# Patient Record
Sex: MALE | Race: WHITE | NOT HISPANIC OR LATINO | Employment: UNEMPLOYED
[De-identification: names, ages, dates, MRNs, and addresses within clinical notes are randomized per-mention and may not be internally consistent; named-entity substitution may affect disease eponyms.]

---

## 2018-06-29 LAB
ALBUMIN SERPL-MCNC: 4.5 GM/DL (ref 3.6–5.1)
ALBUMIN/GLOB SERPL: 1.5 {RATIO} (ref 1–2.4)
ALP SERPL-CCNC: 56 UNIT/L (ref 45–117)
ALT SERPL-CCNC: 23 UNIT/L
AMPHETAMINES UR QL SCN>500 NG/ML: NEGATIVE
ANALYZER ANC (IANC): ABNORMAL
ANION GAP SERPL CALC-SCNC: 13 MMOL/L (ref 10–20)
APAP SERPL-MCNC: <2 MCG/ML (ref 10–30)
AST SERPL-CCNC: 18 UNIT/L
BARBITURATES UR QL SCN>200 NG/ML: NEGATIVE
BASOPHILS # BLD: 0.1 THOUSAND/MCL (ref 0–0.3)
BASOPHILS NFR BLD: 0 %
BENZODIAZ UR QL SCN>200 NG/ML: NEGATIVE
BILIRUB SERPL-MCNC: 2.6 MG/DL (ref 0.2–1)
BUN SERPL-MCNC: 13 MG/DL (ref 6–20)
BUN/CREAT SERPL: 11 (ref 7–25)
BZE UR QL SCN>150 NG/ML: NEGATIVE
CALCIUM SERPL-MCNC: 9.3 MG/DL (ref 8.4–10.2)
CANNABINOIDS UR QL SCN>50 NG/ML: POSITIVE
CHLORIDE: 103 MMOL/L (ref 98–107)
CO2 SERPL-SCNC: 27 MMOL/L (ref 21–32)
CREAT SERPL-MCNC: 1.18 MG/DL (ref 0.67–1.17)
DIFFERENTIAL METHOD BLD: ABNORMAL
EOSINOPHIL # BLD: 0.1 THOUSAND/MCL (ref 0.1–0.5)
EOSINOPHIL NFR BLD: 1 %
ERYTHROCYTE [DISTWIDTH] IN BLOOD: 12.6 % (ref 11–15)
ETHANOL SERPL-MCNC: NORMAL MG/DL
GLOBULIN SER-MCNC: 3 GM/DL (ref 2–4)
GLUCOSE SERPL-MCNC: 104 MG/DL (ref 65–99)
HEMATOCRIT: 45.8 % (ref 39–51)
HGB BLD-MCNC: 15 GM/DL (ref 13–17)
IMM GRANULOCYTES # BLD AUTO: 0 THOUSAND/MCL (ref 0–0.2)
IMM GRANULOCYTES NFR BLD: 0 %
LYMPHOCYTES # BLD: 1.1 THOUSAND/MCL (ref 1–4.8)
LYMPHOCYTES NFR BLD: 10 %
MCH RBC QN AUTO: 28.5 PG (ref 26–34)
MCHC RBC AUTO-ENTMCNC: 32.8 GM/DL (ref 32–36.5)
MCV RBC AUTO: 86.9 FL (ref 78–100)
MONOCYTES # BLD: 0.7 THOUSAND/MCL (ref 0.3–0.9)
MONOCYTES NFR BLD: 6 %
NEUTROPHILS # BLD: 9.3 THOUSAND/MCL (ref 1.8–7.7)
NEUTROPHILS NFR BLD: 83 %
NEUTS SEG NFR BLD: ABNORMAL %
NRBC (NRBCRE): 0 /100 WBC
OPIATES UR QL SCN>300 NG/ML: NEGATIVE
PCP UR QL SCN>25 NG/ML: NEGATIVE
PLATELET # BLD: 177 THOUSAND/MCL (ref 140–450)
POTASSIUM SERPL-SCNC: 3.7 MMOL/L (ref 3.4–5.1)
PROT SERPL-MCNC: 7.5 GM/DL (ref 6.4–8.2)
RBC # BLD: 5.27 MILLION/MCL (ref 4.5–5.9)
SALICYLATES SERPL-MCNC: <2.8 MG/DL
SODIUM SERPL-SCNC: 139 MMOL/L (ref 135–145)
WBC # BLD: 11.3 THOUSAND/MCL (ref 4.2–11)

## 2018-07-02 ENCOUNTER — HOSPITAL (OUTPATIENT)
Dept: OTHER | Age: 28
End: 2018-07-02
Attending: PSYCHIATRY & NEUROLOGY

## 2018-07-03 ENCOUNTER — DIAGNOSTIC TRANS (OUTPATIENT)
Dept: OTHER | Age: 28
End: 2018-07-03

## 2018-07-03 LAB
CHOLEST SERPL-MCNC: 128 MG/DL
CHOLEST/HDLC SERPL: 3.4 {RATIO}
HDLC SERPL-MCNC: 38 MG/DL
LDLC SERPL CALC-MCNC: 74 MG/DL
NONHDLC SERPL-MCNC: 90 MG/DL
TRIGLYCERIDE (TRIGP): 78 MG/DL

## 2018-07-04 LAB
GLYCOHEMOGLOBIN: 6.1 % (ref 4.5–5.6)
TSH SERPL-ACNC: 2.7 MCUNIT/ML (ref 0.35–5)

## 2020-01-24 ENCOUNTER — OFFICE VISIT (OUTPATIENT)
Dept: FAMILY MEDICINE | Facility: CLINIC | Age: 30
End: 2020-01-24
Payer: COMMERCIAL

## 2020-01-24 VITALS
SYSTOLIC BLOOD PRESSURE: 130 MMHG | HEART RATE: 91 BPM | OXYGEN SATURATION: 98 % | HEIGHT: 67 IN | TEMPERATURE: 99 F | DIASTOLIC BLOOD PRESSURE: 80 MMHG | BODY MASS INDEX: 25.55 KG/M2 | WEIGHT: 162.81 LBS

## 2020-01-24 DIAGNOSIS — Z23 IMMUNIZATION DUE: ICD-10-CM

## 2020-01-24 DIAGNOSIS — N52.9 ERECTILE DYSFUNCTION, UNSPECIFIED ERECTILE DYSFUNCTION TYPE: ICD-10-CM

## 2020-01-24 DIAGNOSIS — Z11.59 SCREENING FOR VIRAL DISEASE: ICD-10-CM

## 2020-01-24 DIAGNOSIS — F41.8 ANXIETY WITH DEPRESSION: Primary | ICD-10-CM

## 2020-01-24 DIAGNOSIS — Z72.0 TOBACCO USE: ICD-10-CM

## 2020-01-24 PROCEDURE — 99999 PR PBB SHADOW E&M-NEW PATIENT-LVL IV: ICD-10-PCS | Mod: PBBFAC,,, | Performed by: FAMILY MEDICINE

## 2020-01-24 PROCEDURE — 99203 OFFICE O/P NEW LOW 30 MIN: CPT | Mod: S$GLB,,, | Performed by: FAMILY MEDICINE

## 2020-01-24 PROCEDURE — 3008F PR BODY MASS INDEX (BMI) DOCUMENTED: ICD-10-PCS | Mod: CPTII,S$GLB,, | Performed by: FAMILY MEDICINE

## 2020-01-24 PROCEDURE — 99203 PR OFFICE/OUTPT VISIT, NEW, LEVL III, 30-44 MIN: ICD-10-PCS | Mod: S$GLB,,, | Performed by: FAMILY MEDICINE

## 2020-01-24 PROCEDURE — 99999 PR PBB SHADOW E&M-NEW PATIENT-LVL IV: CPT | Mod: PBBFAC,,, | Performed by: FAMILY MEDICINE

## 2020-01-24 PROCEDURE — 3008F BODY MASS INDEX DOCD: CPT | Mod: CPTII,S$GLB,, | Performed by: FAMILY MEDICINE

## 2020-01-24 RX ORDER — SILDENAFIL CITRATE 20 MG/1
TABLET ORAL
Qty: 20 TABLET | Refills: 2 | Status: SHIPPED | OUTPATIENT
Start: 2020-01-24 | End: 2020-01-30

## 2020-01-24 NOTE — PROGRESS NOTES
THIS DOCUMENT WAS MADE IN PART WITH VOICE RECOGNITION SOFTWARE.  OCCASIONALLY THIS SOFTWARE WILL MISINTERPRET WORDS OR PHRASES.    Assessment and Plan:    1. Anxiety with depression  Controlled depression and anxiety, will use Trintellix secondary to lower incidence of sexual side effects.  Has been on sertraline with no improvement.  - vortioxetine (TRINTELLIX) 10 mg Tab; Take 1 tablet (10 mg total) by mouth once daily.  Dispense: 30 tablet; Refill: 11    2. Erectile dysfunction, unspecified erectile dysfunction type  I believe this is likely related to his depression and anxiety, will do standard blood work in use sildenafil as needed.  - CBC auto differential; Future  - Comprehensive metabolic panel; Future  - Lipid panel; Future  - Testosterone; Future  - TSH; Future  - T4, free; Future  - T3, free; Future  - Hemoglobin A1c; Future  - sildenafil (REVATIO) 20 mg Tab; Take 1-3 tablets at least 1 hour before sexual activity  Dispense: 20 tablet; Refill: 2    3. Tobacco use  Recommended cessation, patient motivated to quit  - Ambulatory referral to Smoking Cessation Program    4. Screening for viral disease  - HIV 1/2 Ag/Ab (4th Gen); Future    5. Immunization due  Defers immunization today        ______________________________________________________________________  Subjective:    Chief Complaint:  Chief Complaint   Patient presents with    Establish Care    Erectile Dysfunction     ocurring x a few months         HPI:  Felipe is a 29 y.o. year old     Erectile dysfunction  Duration of 8 months  Trouble gaining / maintaining erection  Difficult to reach orgasm.   No history of elevated cholesterol , diabetes, hypertension  Denies any numbness to area  No medications currently  Never tried ED medication  Has Anxiety / Depression = A little worse during this time period.     Tobacco use, history of marijuana use  Motivated to quit     Tricuspid insufficiency  Denies any heart symptoms  Diagnosis at early age.  "    Anxiety with depression with history of suicidal ideation  Seen in emergency department on 02/28/2018 at [facility] for crisis intervention.  Severe depression with thoughts of worthlessness.  Multiple traumatic childhood events, witness murder of friend.  Was transferred to an inpatient psychiatric facility.  Previously on Zoloft; quit about 1 year ago; quit due to insurance issues.   Currently sad more days than happy; currently having problem with roommate; stuck in a lease  Anxiety = "sometimes" ; self esteem issues cause worry.       Past Medical History:  Past Medical History:   Diagnosis Date    Anxiety     Depression        Past Surgical History:  History reviewed. No pertinent surgical history.    Family History:  History reviewed. No pertinent family history.    Social History:  Social History     Socioeconomic History    Marital status: Single     Spouse name: Not on file    Number of children: Not on file    Years of education: Not on file    Highest education level: Not on file   Occupational History    Not on file   Social Needs    Financial resource strain: Not on file    Food insecurity:     Worry: Not on file     Inability: Not on file    Transportation needs:     Medical: Not on file     Non-medical: Not on file   Tobacco Use    Smoking status: Current Every Day Smoker     Types: Cigarettes, Vaping with nicotine     Start date: 2004    Smokeless tobacco: Never Used   Substance and Sexual Activity    Alcohol use: Not on file    Drug use: Not on file    Sexual activity: Not on file   Lifestyle    Physical activity:     Days per week: Not on file     Minutes per session: Not on file    Stress: Not on file   Relationships    Social connections:     Talks on phone: Not on file     Gets together: Not on file     Attends Hindu service: Not on file     Active member of club or organization: Not on file     Attends meetings of clubs or organizations: Not on file     " "Relationship status: Not on file   Other Topics Concern    Not on file   Social History Narrative    Not on file       Medications:  No current outpatient medications on file prior to visit.     No current facility-administered medications on file prior to visit.        Allergies:  Patient has no known allergies.    Immunizations:  Immunization History   Administered Date(s) Administered    Meningococcal Conjugate (MCV4P) 08/22/2013    Tdap 08/22/2013       Review of Systems:  Review of Systems   Genitourinary:        Erectile dysfunction   Psychiatric/Behavioral: Positive for dysphoric mood.   All other systems reviewed and are negative.      Objective:    Vitals:  Vitals:    01/24/20 0929   BP: 130/80   Pulse: 91   Temp: 98.6 °F (37 °C)   TempSrc: Oral   SpO2: 98%   Weight: 73.9 kg (162 lb 13 oz)   Height: 5' 7" (1.702 m)   PainSc: 0-No pain       Physical Exam   Constitutional: No distress.   HENT:   Head: Normocephalic and atraumatic.   Eyes: Pupils are equal, round, and reactive to light. EOM are normal.   Neck: Neck supple.   Cardiovascular: Normal rate and regular rhythm. Exam reveals no friction rub.   No murmur heard.  Pulmonary/Chest: Effort normal and breath sounds normal.   Abdominal: Soft. Bowel sounds are normal. He exhibits no distension. There is no tenderness.   Skin: Skin is warm and dry. No rash noted.   Psychiatric: He has a normal mood and affect. His behavior is normal.       Data:  No previous labs, imaging, or notes available.        Bhavesh Black MD  Family Medicine    "

## 2020-01-28 ENCOUNTER — LAB VISIT (OUTPATIENT)
Dept: LAB | Facility: HOSPITAL | Age: 30
End: 2020-01-28
Attending: FAMILY MEDICINE
Payer: COMMERCIAL

## 2020-01-28 DIAGNOSIS — N52.9 ERECTILE DYSFUNCTION, UNSPECIFIED ERECTILE DYSFUNCTION TYPE: ICD-10-CM

## 2020-01-28 DIAGNOSIS — Z11.59 SCREENING FOR VIRAL DISEASE: ICD-10-CM

## 2020-01-28 LAB
ALBUMIN SERPL BCP-MCNC: 4.6 G/DL (ref 3.5–5.2)
ALP SERPL-CCNC: 67 U/L (ref 55–135)
ALT SERPL W/O P-5'-P-CCNC: 37 U/L (ref 10–44)
ANION GAP SERPL CALC-SCNC: 9 MMOL/L (ref 8–16)
AST SERPL-CCNC: 31 U/L (ref 10–40)
BASOPHILS # BLD AUTO: 0.04 K/UL (ref 0–0.2)
BASOPHILS NFR BLD: 0.9 % (ref 0–1.9)
BILIRUB SERPL-MCNC: 0.4 MG/DL (ref 0.1–1)
BUN SERPL-MCNC: 13 MG/DL (ref 6–20)
CALCIUM SERPL-MCNC: 9.8 MG/DL (ref 8.7–10.5)
CHLORIDE SERPL-SCNC: 106 MMOL/L (ref 95–110)
CHOLEST SERPL-MCNC: 208 MG/DL (ref 120–199)
CHOLEST/HDLC SERPL: 3.5 {RATIO} (ref 2–5)
CO2 SERPL-SCNC: 26 MMOL/L (ref 23–29)
CREAT SERPL-MCNC: 1.1 MG/DL (ref 0.5–1.4)
DIFFERENTIAL METHOD: ABNORMAL
EOSINOPHIL # BLD AUTO: 0.1 K/UL (ref 0–0.5)
EOSINOPHIL NFR BLD: 2.3 % (ref 0–8)
ERYTHROCYTE [DISTWIDTH] IN BLOOD BY AUTOMATED COUNT: 12.6 % (ref 11.5–14.5)
EST. GFR  (AFRICAN AMERICAN): >60 ML/MIN/1.73 M^2
EST. GFR  (NON AFRICAN AMERICAN): >60 ML/MIN/1.73 M^2
ESTIMATED AVG GLUCOSE: 103 MG/DL (ref 68–131)
GLUCOSE SERPL-MCNC: 94 MG/DL (ref 70–110)
HBA1C MFR BLD HPLC: 5.2 % (ref 4–5.6)
HCT VFR BLD AUTO: 47.5 % (ref 40–54)
HDLC SERPL-MCNC: 59 MG/DL (ref 40–75)
HDLC SERPL: 28.4 % (ref 20–50)
HGB BLD-MCNC: 15.8 G/DL (ref 14–18)
IMM GRANULOCYTES # BLD AUTO: 0.01 K/UL (ref 0–0.04)
IMM GRANULOCYTES NFR BLD AUTO: 0.2 % (ref 0–0.5)
LDLC SERPL CALC-MCNC: 134.2 MG/DL (ref 63–159)
LYMPHOCYTES # BLD AUTO: 1.7 K/UL (ref 1–4.8)
LYMPHOCYTES NFR BLD: 38.3 % (ref 18–48)
MCH RBC QN AUTO: 32.6 PG (ref 27–31)
MCHC RBC AUTO-ENTMCNC: 33.3 G/DL (ref 32–36)
MCV RBC AUTO: 98 FL (ref 82–98)
MONOCYTES # BLD AUTO: 0.5 K/UL (ref 0.3–1)
MONOCYTES NFR BLD: 10.6 % (ref 4–15)
NEUTROPHILS # BLD AUTO: 2.1 K/UL (ref 1.8–7.7)
NEUTROPHILS NFR BLD: 47.7 % (ref 38–73)
NONHDLC SERPL-MCNC: 149 MG/DL
NRBC BLD-RTO: 0 /100 WBC
PLATELET # BLD AUTO: 253 K/UL (ref 150–350)
PMV BLD AUTO: 12.3 FL (ref 9.2–12.9)
POTASSIUM SERPL-SCNC: 4.3 MMOL/L (ref 3.5–5.1)
PROT SERPL-MCNC: 7.1 G/DL (ref 6–8.4)
RBC # BLD AUTO: 4.85 M/UL (ref 4.6–6.2)
SODIUM SERPL-SCNC: 141 MMOL/L (ref 136–145)
T3FREE SERPL-MCNC: 2.8 PG/ML (ref 2.3–4.2)
T4 FREE SERPL-MCNC: 1.04 NG/DL (ref 0.71–1.51)
TESTOST SERPL-MCNC: 616 NG/DL (ref 304–1227)
TRIGL SERPL-MCNC: 74 MG/DL (ref 30–150)
TSH SERPL DL<=0.005 MIU/L-ACNC: 0.76 UIU/ML (ref 0.4–4)
WBC # BLD AUTO: 4.36 K/UL (ref 3.9–12.7)

## 2020-01-28 PROCEDURE — 84481 FREE ASSAY (FT-3): CPT

## 2020-01-28 PROCEDURE — 80053 COMPREHEN METABOLIC PANEL: CPT

## 2020-01-28 PROCEDURE — 84443 ASSAY THYROID STIM HORMONE: CPT

## 2020-01-28 PROCEDURE — 85025 COMPLETE CBC W/AUTO DIFF WBC: CPT

## 2020-01-28 PROCEDURE — 84439 ASSAY OF FREE THYROXINE: CPT

## 2020-01-28 PROCEDURE — 83036 HEMOGLOBIN GLYCOSYLATED A1C: CPT

## 2020-01-28 PROCEDURE — 86703 HIV-1/HIV-2 1 RESULT ANTBDY: CPT

## 2020-01-28 PROCEDURE — 36415 COLL VENOUS BLD VENIPUNCTURE: CPT | Mod: PO

## 2020-01-28 PROCEDURE — 80061 LIPID PANEL: CPT

## 2020-01-28 PROCEDURE — 84403 ASSAY OF TOTAL TESTOSTERONE: CPT

## 2020-01-29 LAB — HIV 1+2 AB+HIV1 P24 AG SERPL QL IA: NEGATIVE

## 2020-01-30 ENCOUNTER — PATIENT MESSAGE (OUTPATIENT)
Dept: FAMILY MEDICINE | Facility: CLINIC | Age: 30
End: 2020-01-30

## 2020-01-30 DIAGNOSIS — N52.9 ERECTILE DYSFUNCTION, UNSPECIFIED ERECTILE DYSFUNCTION TYPE: Primary | ICD-10-CM

## 2020-01-30 RX ORDER — SILDENAFIL 50 MG/1
50 TABLET, FILM COATED ORAL DAILY PRN
Qty: 20 TABLET | Refills: 1 | Status: SHIPPED | OUTPATIENT
Start: 2020-01-30 | End: 2020-05-26

## 2020-01-30 NOTE — TELEPHONE ENCOUNTER
[place] notified to be looking for PA for trintellix. Please advise on sildenafil and if you would like to try [facility]

## 2020-01-31 ENCOUNTER — CLINICAL SUPPORT (OUTPATIENT)
Dept: SMOKING CESSATION | Facility: CLINIC | Age: 30
End: 2020-01-31
Payer: COMMERCIAL

## 2020-01-31 DIAGNOSIS — F17.210 MODERATE SMOKER (20 OR LESS PER DAY): Primary | ICD-10-CM

## 2020-01-31 PROCEDURE — 99999 PR PBB SHADOW E&M-EST. PATIENT-LVL II: CPT | Mod: PBBFAC,,,

## 2020-01-31 PROCEDURE — 99404 PREV MED CNSL INDIV APPRX 60: CPT | Mod: S$PBB,,, | Performed by: GENERAL PRACTICE

## 2020-01-31 PROCEDURE — 99999 PR PBB SHADOW E&M-EST. PATIENT-LVL II: ICD-10-PCS | Mod: PBBFAC,,,

## 2020-01-31 PROCEDURE — 99404 PR PREVENT COUNSEL,INDIV,60 MIN: ICD-10-PCS | Mod: S$PBB,,, | Performed by: GENERAL PRACTICE

## 2020-01-31 RX ORDER — DIPHENHYDRAMINE HCL 25 MG
CAPSULE ORAL
Qty: 100 EACH | Refills: 0 | Status: SHIPPED | OUTPATIENT
Start: 2020-01-31 | End: 2020-02-14 | Stop reason: SDUPTHER

## 2020-01-31 NOTE — Clinical Note
Patient will be participating in weekly tobacco cessation meetings and will begin the prescribed tobacco cessation medication regime of the 4 mg gum.

## 2020-02-13 ENCOUNTER — PATIENT MESSAGE (OUTPATIENT)
Dept: FAMILY MEDICINE | Facility: CLINIC | Age: 30
End: 2020-02-13

## 2020-02-14 ENCOUNTER — CLINICAL SUPPORT (OUTPATIENT)
Dept: SMOKING CESSATION | Facility: CLINIC | Age: 30
End: 2020-02-14
Payer: COMMERCIAL

## 2020-02-14 DIAGNOSIS — F17.210 MODERATE SMOKER (20 OR LESS PER DAY): Primary | ICD-10-CM

## 2020-02-14 PROCEDURE — 99999 PR PBB SHADOW E&M-EST. PATIENT-LVL II: CPT | Mod: PBBFAC,,,

## 2020-02-14 PROCEDURE — 99407 BEHAV CHNG SMOKING > 10 MIN: CPT | Mod: S$GLB,,, | Performed by: GENERAL PRACTICE

## 2020-02-14 PROCEDURE — 99999 PR PBB SHADOW E&M-EST. PATIENT-LVL II: ICD-10-PCS | Mod: PBBFAC,,,

## 2020-02-14 PROCEDURE — 99407 PR TOBACCO USE CESSATION INTENSIVE >10 MINUTES: ICD-10-PCS | Mod: S$GLB,,, | Performed by: GENERAL PRACTICE

## 2020-02-14 RX ORDER — DIPHENHYDRAMINE HCL 25 MG
CAPSULE ORAL
Qty: 100 EACH | Refills: 0 | Status: SHIPPED | OUTPATIENT
Start: 2020-02-14 | End: 2020-05-26

## 2020-02-18 ENCOUNTER — CLINICAL SUPPORT (OUTPATIENT)
Dept: SMOKING CESSATION | Facility: CLINIC | Age: 30
End: 2020-02-18
Payer: COMMERCIAL

## 2020-02-18 DIAGNOSIS — F17.210 MODERATE SMOKER (20 OR LESS PER DAY): ICD-10-CM

## 2020-02-18 DIAGNOSIS — F17.200 TOBACCO USE DISORDER: Primary | ICD-10-CM

## 2020-02-18 PROCEDURE — 99404 PR PREVENT COUNSEL,INDIV,60 MIN: ICD-10-PCS | Mod: S$PBB,,, | Performed by: GENERAL PRACTICE

## 2020-02-18 PROCEDURE — 99404 PREV MED CNSL INDIV APPRX 60: CPT | Mod: S$PBB,,, | Performed by: GENERAL PRACTICE

## 2020-02-18 PROCEDURE — 99999 PR PBB SHADOW E&M-EST. PATIENT-LVL II: ICD-10-PCS | Mod: PBBFAC,,,

## 2020-02-18 PROCEDURE — 99999 PR PBB SHADOW E&M-EST. PATIENT-LVL II: CPT | Mod: PBBFAC,,,

## 2020-02-18 RX ORDER — DIPHENHYDRAMINE HCL 25 MG
CAPSULE ORAL
Qty: 100 EACH | Refills: 0 | Status: SHIPPED | OUTPATIENT
Start: 2020-02-18 | End: 2020-03-17

## 2020-02-18 NOTE — PROGRESS NOTES
Individual Follow-Up Form    2/18/2020    Quit Date: 2/13/20    Clinical Status of Patient: Outpatient    Length of Service: 60 minutes    Continuing Medication: yes  Nicotine gum    Other Medications: none     Target Symptoms: Withdrawal and medication side effects. The following were  rated moderate (3) to severe (4) on TCRS:  · Moderate (3): depression;discussed with patient  · Severe (4): none    Comments: Patient is currently vape free and using 8-10 pcs gum per day. We discussed reducing amount of gum and strategies to maintain his quit. I recommended patient discuss his depression with his doctor as he states he still has not received his depression medication. Patient denies any suicidal ideation at this time. We also discussed nicotine withdrawal symptoms.     Diagnosis: F17.200    Next Visit: 2 weeks

## 2020-02-18 NOTE — Clinical Note
Patient is currently vape free and using 8-10 pcs gum per day. We discussed reducing amount of gum and strategies to maintain his quit. I recommended patient discuss his depression with his doctor as he states he still has not received his depression medication. Patient denies any suicidal ideation at this time. We also discussed nicotine withdrawal symptoms.

## 2020-02-28 ENCOUNTER — TELEPHONE (OUTPATIENT)
Dept: FAMILY MEDICINE | Facility: CLINIC | Age: 30
End: 2020-02-28

## 2020-02-28 NOTE — TELEPHONE ENCOUNTER
LM for pt to call back. Attempted to submit PA for his trintellix, zip code we have on file does not match what his insurance has. Please verify

## 2020-03-17 ENCOUNTER — OFFICE VISIT (OUTPATIENT)
Dept: FAMILY MEDICINE | Facility: CLINIC | Age: 30
End: 2020-03-17
Payer: COMMERCIAL

## 2020-03-17 VITALS
BODY MASS INDEX: 25.01 KG/M2 | HEART RATE: 67 BPM | TEMPERATURE: 99 F | HEIGHT: 67 IN | OXYGEN SATURATION: 97 % | WEIGHT: 159.38 LBS | DIASTOLIC BLOOD PRESSURE: 80 MMHG | SYSTOLIC BLOOD PRESSURE: 104 MMHG

## 2020-03-17 DIAGNOSIS — B97.89 VIRAL RESPIRATORY INFECTION: Primary | ICD-10-CM

## 2020-03-17 DIAGNOSIS — J98.8 VIRAL RESPIRATORY INFECTION: Primary | ICD-10-CM

## 2020-03-17 PROCEDURE — 99214 PR OFFICE/OUTPT VISIT, EST, LEVL IV, 30-39 MIN: ICD-10-PCS | Mod: S$GLB,,, | Performed by: FAMILY MEDICINE

## 2020-03-17 PROCEDURE — 99999 PR PBB SHADOW E&M-EST. PATIENT-LVL III: ICD-10-PCS | Mod: PBBFAC,,, | Performed by: FAMILY MEDICINE

## 2020-03-17 PROCEDURE — 99999 PR PBB SHADOW E&M-EST. PATIENT-LVL III: CPT | Mod: PBBFAC,,, | Performed by: FAMILY MEDICINE

## 2020-03-17 PROCEDURE — 3008F PR BODY MASS INDEX (BMI) DOCUMENTED: ICD-10-PCS | Mod: CPTII,S$GLB,, | Performed by: FAMILY MEDICINE

## 2020-03-17 PROCEDURE — 99214 OFFICE O/P EST MOD 30 MIN: CPT | Mod: S$GLB,,, | Performed by: FAMILY MEDICINE

## 2020-03-17 PROCEDURE — 3008F BODY MASS INDEX DOCD: CPT | Mod: CPTII,S$GLB,, | Performed by: FAMILY MEDICINE

## 2020-03-17 NOTE — PROGRESS NOTES
THIS DOCUMENT WAS MADE IN PART WITH VOICE RECOGNITION SOFTWARE.  OCCASIONALLY THIS SOFTWARE WILL MISINTERPRET WORDS OR PHRASES.    Assessment and Plan:    1. Viral respiratory infection  Rapid flu negative  High likelihood of Covid Infection  Does not meet criteria for testing  Recommended avoiding work for 2 weeks and without fever times 72 hr  Letter given the patient  Quarantine recommended  Emergency room precautions given  ______________________________________________________________________  Subjective:    Chief Complaint:  Chief Complaint   Patient presents with    Sinus Problem     headaches, slight dizziness, chest tightness, no congestion, no chills, no sore throat, pt c/o getting winded at times. Fever of 102 Monday         HPI:  Felipe is a 29 y.o. year old     29-year-old male complains of upper respiratory complaints including headache, dizziness, chest tightness.  Reports shortness of breath and fever of 102° maximum.  No known sick contacts.  Denies any recent travel.  Influenza test was negative.  No prior history of pulmonary issues.    Past Medical History:  Past Medical History:   Diagnosis Date    Anxiety     Depression     Tobacco use     Tricuspid insufficiency        Past Surgical History:  Past Surgical History:   Procedure Laterality Date    Left foot laceration         Family History:  Family History   Problem Relation Age of Onset    No Known Problems Mother     Kidney failure Father     No Known Problems Sister     No Known Problems Brother        Social History:  Social History     Socioeconomic History    Marital status: Single     Spouse name: Not on file    Number of children: Not on file    Years of education: Not on file    Highest education level: Not on file   Occupational History    Not on file   Social Needs    Financial resource strain: Not on file    Food insecurity:     Worry: Not on file     Inability: Not on file    Transportation needs:     Medical:  Not on file     Non-medical: Not on file   Tobacco Use    Smoking status: Current Every Day Smoker     Packs/day: 1.00     Years: 12.00     Pack years: 12.00     Types: Cigarettes, Vaping with nicotine     Start date: 2004    Smokeless tobacco: Never Used   Substance and Sexual Activity    Alcohol use: Not Currently     Comment: rarely    Drug use: Not Currently     Types: Marijuana    Sexual activity: Not Currently   Lifestyle    Physical activity:     Days per week: Not on file     Minutes per session: Not on file    Stress: Not on file   Relationships    Social connections:     Talks on phone: Not on file     Gets together: Not on file     Attends Jainism service: Not on file     Active member of club or organization: Not on file     Attends meetings of clubs or organizations: Not on file     Relationship status: Not on file   Other Topics Concern    Not on file   Social History Narrative    Not on file       Medications:  Current Outpatient Medications on File Prior to Visit   Medication Sig Dispense Refill    nicotine polacrilex (NICORETTE) 4 MG Gum Take up to 10 pieces by mouth daily as needed 100 each 0    sildenafil (VIAGRA) 50 MG tablet Take 1 tablet (50 mg total) by mouth daily as needed for Erectile Dysfunction. 20 tablet 1    [DISCONTINUED] nicotine polacrilex (NICORETTE) 4 MG Gum Take up to 10 pieces by mouth daily as needed 100 each 0    [DISCONTINUED] vortioxetine (TRINTELLIX) 10 mg Tab Take 1 tablet (10 mg total) by mouth once daily. (Patient not taking: Reported on 3/17/2020) 30 tablet 11     No current facility-administered medications on file prior to visit.        Allergies:  Patient has no known allergies.    Immunizations:  Immunization History   Administered Date(s) Administered    Meningococcal Conjugate (MCV4P) 08/22/2013    Tdap 08/22/2013       Review of Systems:  Review of Systems   Constitutional: Positive for fever.   Respiratory: Positive for cough and shortness of  "breath.    All other systems reviewed and are negative.      Objective:    Vitals:  Vitals:    03/17/20 1404   BP: 104/80   Pulse: 67   Temp: 98.8 °F (37.1 °C)   TempSrc: Oral   SpO2: 97%   Weight: 72.3 kg (159 lb 6.3 oz)   Height: 5' 7" (1.702 m)   PainSc: 0-No pain       Physical Exam   Constitutional: He appears well-developed.   HENT:   Head: Normocephalic.   Nose: Mucosal edema and rhinorrhea present.   Mouth/Throat: Posterior oropharyngeal erythema present. No oropharyngeal exudate.   Eyes: EOM are normal.   Neck: Normal range of motion. Neck supple.   Cardiovascular: Normal rate and regular rhythm.   Pulmonary/Chest: Effort normal and breath sounds normal.   Abdominal: Soft.   Skin: Skin is warm. No rash noted.       Data:  No previous labs, imaging, or notes available.        Bhavesh Black MD  Family Medicine    "

## 2020-03-17 NOTE — LETTER
Pt Name: Felipe Nava  YOB: 1990     To Whom It May Concern:     Felipe Nava was in contact with/seen in my office on 03/17/2020. COVID-19 is present in our communities across the state. There is limited testing for COVID at this time, so not all patients can be tested. In this situation, your employee meets the following criteria:     Felipe Nava has expressed a desire/need to be tested for COVID-19 virus and does have some symptoms (upper respiratory, fever, etc) but does not meet all the criteria for testing as defined by the Center of Disease Control/Office of Public Health at this time. The employee can return to work after 14 days AND once they are without fever for 72 hours without the use of fever reducing medications (Tylenol, Motrin, etc). Infection control policies of the employer should be followed, as well as good hand hygiene.     If you have any questions or concerns, or if I can be of further assistance, please do not hesitate to contact me.     Sincerely,            Provider Signature/Printed Name:Bhavesh Black MD Date:03/17/2020

## 2020-03-27 ENCOUNTER — TELEPHONE (OUTPATIENT)
Dept: FAMILY MEDICINE | Facility: CLINIC | Age: 30
End: 2020-03-27

## 2020-03-27 DIAGNOSIS — N52.9 ERECTILE DYSFUNCTION, UNSPECIFIED ERECTILE DYSFUNCTION TYPE: Primary | ICD-10-CM

## 2020-03-27 NOTE — TELEPHONE ENCOUNTER
----- Message from Jennifer Seo sent at 3/27/2020 12:14 PM CDT -----  Contact: pt [phone]  Patient called and asked for a referral to a urologist  For Impatency .   Call back [phone]

## 2020-03-31 ENCOUNTER — PATIENT OUTREACH (OUTPATIENT)
Dept: ADMINISTRATIVE | Facility: OTHER | Age: 30
End: 2020-03-31

## 2020-04-01 ENCOUNTER — OFFICE VISIT (OUTPATIENT)
Dept: UROLOGY | Facility: CLINIC | Age: 30
End: 2020-04-01
Payer: COMMERCIAL

## 2020-04-01 DIAGNOSIS — N52.9 ERECTILE DYSFUNCTION, UNSPECIFIED ERECTILE DYSFUNCTION TYPE: ICD-10-CM

## 2020-04-01 PROCEDURE — 99204 OFFICE O/P NEW MOD 45 MIN: CPT | Mod: 95,,, | Performed by: UROLOGY

## 2020-04-01 PROCEDURE — 99204 PR OFFICE/OUTPT VISIT, NEW, LEVL IV, 45-59 MIN: ICD-10-PCS | Mod: 95,,, | Performed by: UROLOGY

## 2020-04-01 RX ORDER — TADALAFIL 20 MG/1
20 TABLET ORAL
Qty: 20 TABLET | Refills: 11 | Status: SHIPPED | OUTPATIENT
Start: 2020-04-01 | End: 2022-08-14

## 2020-04-01 NOTE — LETTER
April 1, 2020      Bhavesh Black MD  [address]  [place] [address]           [facility]  [address] [place] [address]  Phone: [phone]  Fax: [phone]          Patient: Felipe Nava   MR Number: [id number]   YOB: 1990   Date of Visit: 4/1/2020       Dear Dr. Bhavesh Black:    Thank you for referring Felipe Nava to me for evaluation. Attached you will find relevant portions of my assessment and plan of care.    If you have questions, please do not hesitate to call me. I look forward to following Felipe Nava along with you.    Sincerely,    Rashard Salvador MD    Enclosure  CC:  No Recipients    If you would like to receive this communication electronically, please contact [email or web address] or [phone] to request more information on [employer] Link access.    For providers and/or their staff who would like to refer a patient to [facility], please contact us through our one-stop-shop provider referral line, [facility], at [phone].    If you feel you have received this communication in error or would no longer like to receive these types of communications, please e-mail [email or web address]

## 2020-04-01 NOTE — PROGRESS NOTES
Subjective:       Patient ID: Felipe Nava is a 29 y.o. male.    Chief Complaint: No chief complaint on file.    The patient location is: home  The chief complaint leading to consultation is: Erectile dysfunction  Visit type: Virtual visit with synchronous audio and video  Total time spent with patient: 25 minutes  Each patient to whom he or she provides medical services by telemedicine is:  (1) informed of the relationship between the physician and patient and the respective role of any other health care provider with respect to management of the patient; and (2) notified that he or she may decline to receive medical services by telemedicine and may withdraw from such care at any time.    Notes: Patient complains of minimal erections. He has tried Sildenafil with minimal success if any. Libido is good. No spontaneous erections. No history of pelvic trauma.    Past Medical History:   Diagnosis Date    Anxiety     Depression     Tobacco use     Tricuspid insufficiency       Past Surgical History:   Procedure Laterality Date    Left foot laceration       Social History     Socioeconomic History    Marital status: Single     Spouse name: Not on file    Number of children: Not on file    Years of education: Not on file    Highest education level: Not on file   Occupational History    Not on file   Social Needs    Financial resource strain: Not on file    Food insecurity:     Worry: Not on file     Inability: Not on file    Transportation needs:     Medical: Not on file     Non-medical: Not on file   Tobacco Use    Smoking status: Current Every Day Smoker     Packs/day: 1.00     Years: 12.00     Pack years: 12.00     Types: Cigarettes, Vaping with nicotine     Start date: 2004    Smokeless tobacco: Never Used   Substance and Sexual Activity    Alcohol use: Not Currently     Comment: rarely    Drug use: Not Currently     Types: Marijuana    Sexual activity: Not Currently   Lifestyle    Physical  activity:     Days per week: Not on file     Minutes per session: Not on file    Stress: Not on file   Relationships    Social connections:     Talks on phone: Not on file     Gets together: Not on file     Attends Mandaen service: Not on file     Active member of club or organization: Not on file     Attends meetings of clubs or organizations: Not on file     Relationship status: Not on file   Other Topics Concern    Not on file   Social History Narrative    Not on file       Family History   Problem Relation Age of Onset    No Known Problems Mother     Kidney failure Father     No Known Problems Sister     No Known Problems Brother       Review of patient's allergies indicates:  No Known Allergies  Medication List with Changes/Refills   Current Medications    NICOTINE POLACRILEX (NICORETTE) 4 MG GUM    Take up to 10 pieces by mouth daily as needed    SILDENAFIL (VIAGRA) 50 MG TABLET    Take 1 tablet (50 mg total) by mouth daily as needed for Erectile Dysfunction.      Review of Systems   Constitutional: Negative.    HENT: Negative.    Eyes: Negative.    Respiratory: Negative.    Cardiovascular: Negative.    Gastrointestinal: Negative.    Endocrine: Negative.    Genitourinary: Negative.    Musculoskeletal: Negative.    Allergic/Immunologic: Negative.    Neurological: Negative.    Hematological: Negative.    Psychiatric/Behavioral:        Depression       Objective:      Physical Exam      Sodium   Date Value Ref Range Status   01/28/2020 141 136 - 145 mmol/L Final     Potassium   Date Value Ref Range Status   01/28/2020 4.3 3.5 - 5.1 mmol/L Final     Chloride   Date Value Ref Range Status   01/28/2020 106 95 - 110 mmol/L Final     CO2   Date Value Ref Range Status   01/28/2020 26 23 - 29 mmol/L Final     Creatinine   Date Value Ref Range Status   01/28/2020 1.1 0.5 - 1.4 mg/dL Final     Glucose   Date Value Ref Range Status   01/28/2020 94 70 - 110 mg/dL Final     AST   Date Value Ref Range Status    01/28/2020 31 10 - 40 U/L Final     ALT   Date Value Ref Range Status   01/28/2020 37 10 - 44 U/L Final     WBC   Date Value Ref Range Status   01/28/2020 4.36 3.90 - 12.70 K/uL Final     Hemoglobin   Date Value Ref Range Status   01/28/2020 15.8 14.0 - 18.0 g/dL Final     Hematocrit   Date Value Ref Range Status   01/28/2020 47.5 40.0 - 54.0 % Final     Platelets   Date Value Ref Range Status   01/28/2020 253 150 - 350 K/uL Final          Assessment/Plan: Erectile dysfunction with minimal spontaneous erections.                                     Will try tadalafil 20 mg prn                                     Will plan penile doppler flow study       Problem List Items Addressed This Visit        Renal/    Erectile dysfunction

## 2020-04-09 ENCOUNTER — PATIENT MESSAGE (OUTPATIENT)
Dept: UROLOGY | Facility: CLINIC | Age: 30
End: 2020-04-09

## 2020-04-14 ENCOUNTER — TELEPHONE (OUTPATIENT)
Dept: UROLOGY | Facility: CLINIC | Age: 30
End: 2020-04-14

## 2020-04-14 NOTE — TELEPHONE ENCOUNTER
Spoke to pt and spoke to Dr. Salvador and he wants to refere the pt to Dr. Rich Hicks in [facility]. I have faxed all information to there office. They are currently closed due to Covid 19. But I spoke to the pt and the office advised to call at the beginning of May to see if they are back open and running. Pt was given the phone number and address. He verbalized understanding.     There phone number is [phone]   Fax: [phone]

## 2020-05-21 ENCOUNTER — CLINICAL SUPPORT (OUTPATIENT)
Dept: SMOKING CESSATION | Facility: CLINIC | Age: 30
End: 2020-05-21

## 2020-05-21 DIAGNOSIS — F17.200 TOBACCO USE DISORDER: Primary | ICD-10-CM

## 2020-05-21 PROCEDURE — 99407 BEHAV CHNG SMOKING > 10 MIN: CPT | Mod: S$GLB,,, | Performed by: GENERAL PRACTICE

## 2020-05-21 PROCEDURE — 99999 PR PBB SHADOW E&M-EST. PATIENT-LVL I: CPT | Mod: PBBFAC,,,

## 2020-05-21 PROCEDURE — 99407 PR TOBACCO USE CESSATION INTENSIVE >10 MINUTES: ICD-10-PCS | Mod: S$GLB,,, | Performed by: GENERAL PRACTICE

## 2020-05-21 PROCEDURE — 99999 PR PBB SHADOW E&M-EST. PATIENT-LVL I: ICD-10-PCS | Mod: PBBFAC,,,

## 2020-05-26 ENCOUNTER — OFFICE VISIT (OUTPATIENT)
Dept: FAMILY MEDICINE | Facility: CLINIC | Age: 30
End: 2020-05-26
Payer: COMMERCIAL

## 2020-05-26 VITALS
HEIGHT: 67 IN | HEART RATE: 62 BPM | OXYGEN SATURATION: 97 % | TEMPERATURE: 98 F | DIASTOLIC BLOOD PRESSURE: 62 MMHG | BODY MASS INDEX: 24.19 KG/M2 | WEIGHT: 154.13 LBS | SYSTOLIC BLOOD PRESSURE: 118 MMHG

## 2020-05-26 DIAGNOSIS — K42.9 UMBILICAL HERNIA WITHOUT OBSTRUCTION AND WITHOUT GANGRENE: ICD-10-CM

## 2020-05-26 DIAGNOSIS — R59.0 INGUINAL LYMPHADENOPATHY: Primary | ICD-10-CM

## 2020-05-26 PROCEDURE — 99999 PR PBB SHADOW E&M-EST. PATIENT-LVL III: CPT | Mod: PBBFAC,,, | Performed by: FAMILY MEDICINE

## 2020-05-26 PROCEDURE — 99999 PR PBB SHADOW E&M-EST. PATIENT-LVL III: ICD-10-PCS | Mod: PBBFAC,,, | Performed by: FAMILY MEDICINE

## 2020-05-26 PROCEDURE — 3008F PR BODY MASS INDEX (BMI) DOCUMENTED: ICD-10-PCS | Mod: CPTII,S$GLB,, | Performed by: FAMILY MEDICINE

## 2020-05-26 PROCEDURE — 99213 OFFICE O/P EST LOW 20 MIN: CPT | Mod: S$GLB,,, | Performed by: FAMILY MEDICINE

## 2020-05-26 PROCEDURE — 3008F BODY MASS INDEX DOCD: CPT | Mod: CPTII,S$GLB,, | Performed by: FAMILY MEDICINE

## 2020-05-26 PROCEDURE — 99213 PR OFFICE/OUTPT VISIT, EST, LEVL III, 20-29 MIN: ICD-10-PCS | Mod: S$GLB,,, | Performed by: FAMILY MEDICINE

## 2020-05-26 NOTE — PROGRESS NOTES
THIS DOCUMENT WAS MADE IN PART WITH VOICE RECOGNITION SOFTWARE.  OCCASIONALLY THIS SOFTWARE WILL MISINTERPRET WORDS OR PHRASES.    Assessment and Plan:  This patient likely just has physiologic lymph nodes that are prominent due to body habitus.  No local signs of infection or inflammation.  Lymph no symmetrical and benign feeling on exam.  In regards to hernia, unremarkable.  Reducible and asymptomatic.  Will refer to surgery if symptoms worsen or change    1. Inguinal lymphadenopathy      2. Umbilical hernia without obstruction and without gangrene          ______________________________________________________________________  Subjective:    Chief Complaint:  Chief Complaint   Patient presents with    Mass     bilateral lower abdominal lumps,        HPI:  Felipe is a 29 y.o. year old     29-year-old male complains of bilateral inguinal nodules  Unsure of duration, noticed by significant other  Denies any pain or evidence of infection in the area  Denies any dysuria  No prior episodes of lumps ear  Denies any worsening of area with Valsalva    Noted umbilical hernia on exam  Denies any pain.  Reducible on exam      Past Medical History:  Past Medical History:   Diagnosis Date    Anxiety     Depression     Tobacco use     Tricuspid insufficiency        Past Surgical History:  Past Surgical History:   Procedure Laterality Date    Left foot laceration         Family History:  Family History   Problem Relation Age of Onset    No Known Problems Mother     Kidney failure Father     No Known Problems Sister     No Known Problems Brother        Social History:  Social History     Socioeconomic History    Marital status: Single     Spouse name: Not on file    Number of children: Not on file    Years of education: Not on file    Highest education level: Not on file   Occupational History    Not on file   Social Needs    Financial resource strain: Not very hard    Food insecurity:     Worry: Never true      Inability: Never true    Transportation needs:     Medical: No     Non-medical: No   Tobacco Use    Smoking status: Current Every Day Smoker     Packs/day: 1.00     Years: 12.00     Pack years: 12.00     Types: Vaping with nicotine     Start date: 2004    Smokeless tobacco: Never Used   Substance and Sexual Activity    Alcohol use: Not Currently     Frequency: Monthly or less     Drinks per session: 1 or 2     Binge frequency: Less than monthly     Comment: rarely    Drug use: Not Currently     Types: Marijuana    Sexual activity: Not Currently   Lifestyle    Physical activity:     Days per week: 3 days     Minutes per session: 30 min    Stress: To some extent   Relationships    Social connections:     Talks on phone: More than three times a week     Gets together: More than three times a week     Attends [employer] service: Not on file     Active member of club or organization: No     Attends meetings of clubs or organizations: Never     Relationship status: Never    Other Topics Concern    Not on file   Social History Narrative    Not on file       Medications:  Current Outpatient Medications on File Prior to Visit   Medication Sig Dispense Refill    tadalafiL (CIALIS) 20 MG Tab Take 1 tablet (20 mg total) by mouth as needed (intercourse). 20 tablet 11    [DISCONTINUED] nicotine polacrilex (NICORETTE) 4 MG Gum Take up to 10 pieces by mouth daily as needed (Patient not taking: Reported on 5/21/2020) 100 each 0    [DISCONTINUED] sildenafil (VIAGRA) 50 MG tablet Take 1 tablet (50 mg total) by mouth daily as needed for Erectile Dysfunction. 20 tablet 1     No current facility-administered medications on file prior to visit.        Allergies:  Patient has no known allergies.    Immunizations:  Immunization History   Administered Date(s) Administered    Meningococcal Conjugate (MCV4P) 08/22/2013    Tdap 08/22/2013       Review of Systems:  Review of Systems   Constitutional: Negative for activity  "change and unexpected weight change.   HENT: Negative for hearing loss, rhinorrhea and trouble swallowing.    Eyes: Negative for discharge and visual disturbance.   Respiratory: Negative for chest tightness and wheezing.    Cardiovascular: Negative for chest pain and palpitations.   Gastrointestinal: Negative for blood in stool, constipation, diarrhea and vomiting.   Endocrine: Negative for polydipsia and polyuria.   Genitourinary: Negative for difficulty urinating, hematuria and urgency.   Musculoskeletal: Positive for arthralgias and joint swelling. Negative for neck pain.   Neurological: Positive for weakness and headaches.   Psychiatric/Behavioral: Negative for confusion and dysphoric mood.   All other systems reviewed and are negative.      Objective:    Vitals:  Vitals:    05/26/20 1600   BP: 118/62   Pulse: 62   Temp: 98 °F (36.7 °C)   TempSrc: Oral   SpO2: 97%   Weight: 69.9 kg (154 lb 1.6 oz)   Height: 5' 7" (1.702 m)   PainSc: 0-No pain       Physical Exam   Constitutional: He appears well-developed and well-nourished.   HENT:   Head: Normocephalic and atraumatic.   Eyes: EOM are normal.   Neck: Normal range of motion.   Pulmonary/Chest: Effort normal. No respiratory distress.   Lymphadenopathy:   Normal bilateral inguinal lymph node chain, physiologic   Psychiatric: He has a normal mood and affect. His behavior is normal. Judgment and thought content normal.       Data:  No previous labs, imaging, or notes available.        Bhavesh Black MD  Family Medicine    "

## 2020-06-09 PROBLEM — R10.9 ABDOMINAL PAIN: Status: ACTIVE | Noted: 2020-06-09

## 2020-07-29 ENCOUNTER — CLINICAL SUPPORT (OUTPATIENT)
Dept: SMOKING CESSATION | Facility: CLINIC | Age: 30
End: 2020-07-29

## 2020-07-29 DIAGNOSIS — F17.200 NICOTINE DEPENDENCE: Primary | ICD-10-CM

## 2020-07-29 PROCEDURE — 99407 BEHAV CHNG SMOKING > 10 MIN: CPT | Mod: S$GLB,,,

## 2020-07-29 PROCEDURE — 99407 PR TOBACCO USE CESSATION INTENSIVE >10 MINUTES: ICD-10-PCS | Mod: S$GLB,,,

## 2020-07-29 NOTE — PROGRESS NOTES
Spoke with patient today in regard to smoking cessation progress for 3-6-month telephone follow up. Patient  states he is tobacco free. Congratulated patient on his accomplishment. Informed patient of benefit period and contact information if any further help or support is needed. Will complete smart form for 3-6 month follow up on Quit attempt #1.

## 2020-12-14 ENCOUNTER — TELEPHONE (OUTPATIENT)
Dept: FAMILY MEDICINE | Facility: CLINIC | Age: 30
End: 2020-12-14

## 2020-12-14 NOTE — TELEPHONE ENCOUNTER
----- Message from Fara Patel MA sent at 12/14/2020 10:24 AM CST -----  PT is requesting to be seen today  Reason: vomiting diarrhea, low grade fever  Call back # [phone]  Next appt with anyone 12/29

## 2021-02-08 ENCOUNTER — TELEPHONE (OUTPATIENT)
Dept: SMOKING CESSATION | Facility: CLINIC | Age: 31
End: 2021-02-08

## 2021-02-09 ENCOUNTER — OFFICE VISIT (OUTPATIENT)
Dept: FAMILY MEDICINE | Facility: CLINIC | Age: 31
End: 2021-02-09
Payer: COMMERCIAL

## 2021-02-09 VITALS
DIASTOLIC BLOOD PRESSURE: 76 MMHG | HEART RATE: 64 BPM | RESPIRATION RATE: 14 BRPM | TEMPERATURE: 99 F | SYSTOLIC BLOOD PRESSURE: 124 MMHG | WEIGHT: 151.25 LBS | BODY MASS INDEX: 23.74 KG/M2 | HEIGHT: 67 IN

## 2021-02-09 DIAGNOSIS — S69.91XA INJURY OF FINGER OF RIGHT HAND, INITIAL ENCOUNTER: Primary | ICD-10-CM

## 2021-02-09 PROCEDURE — 3008F BODY MASS INDEX DOCD: CPT | Mod: CPTII,S$GLB,, | Performed by: FAMILY MEDICINE

## 2021-02-09 PROCEDURE — 99213 OFFICE O/P EST LOW 20 MIN: CPT | Mod: S$GLB,,, | Performed by: FAMILY MEDICINE

## 2021-02-09 PROCEDURE — 99999 PR PBB SHADOW E&M-EST. PATIENT-LVL IV: ICD-10-PCS | Mod: PBBFAC,,, | Performed by: FAMILY MEDICINE

## 2021-02-09 PROCEDURE — 1125F PR PAIN SEVERITY QUANTIFIED, PAIN PRESENT: ICD-10-PCS | Mod: S$GLB,,, | Performed by: FAMILY MEDICINE

## 2021-02-09 PROCEDURE — 99999 PR PBB SHADOW E&M-EST. PATIENT-LVL IV: CPT | Mod: PBBFAC,,, | Performed by: FAMILY MEDICINE

## 2021-02-09 PROCEDURE — 99213 PR OFFICE/OUTPT VISIT, EST, LEVL III, 20-29 MIN: ICD-10-PCS | Mod: S$GLB,,, | Performed by: FAMILY MEDICINE

## 2021-02-09 PROCEDURE — 1125F AMNT PAIN NOTED PAIN PRSNT: CPT | Mod: S$GLB,,, | Performed by: FAMILY MEDICINE

## 2021-02-09 PROCEDURE — 3008F PR BODY MASS INDEX (BMI) DOCUMENTED: ICD-10-PCS | Mod: CPTII,S$GLB,, | Performed by: FAMILY MEDICINE

## 2021-02-09 RX ORDER — MUPIROCIN 20 MG/G
OINTMENT TOPICAL 2 TIMES DAILY
Qty: 30 G | Refills: 0 | Status: SHIPPED | OUTPATIENT
Start: 2021-02-09 | End: 2022-01-10

## 2021-09-01 ENCOUNTER — HOSPITAL ENCOUNTER (EMERGENCY)
Age: 31
Discharge: HOME | End: 2021-09-02
Attending: EMERGENCY MEDICINE

## 2021-09-01 VITALS
OXYGEN SATURATION: 96 % | HEART RATE: 128 BPM | TEMPERATURE: 97.1 F | RESPIRATION RATE: 20 BRPM | SYSTOLIC BLOOD PRESSURE: 148 MMHG | DIASTOLIC BLOOD PRESSURE: 91 MMHG

## 2021-09-01 DIAGNOSIS — F41.9 ANXIETY: Primary | ICD-10-CM

## 2021-09-01 PROCEDURE — 99282 EMERGENCY DEPT VISIT SF MDM: CPT

## 2021-09-01 PROCEDURE — 36415 COLL VENOUS BLD VENIPUNCTURE: CPT

## 2021-09-01 PROCEDURE — 99283 EMERGENCY DEPT VISIT LOW MDM: CPT | Performed by: STUDENT IN AN ORGANIZED HEALTH CARE EDUCATION/TRAINING PROGRAM

## 2021-09-01 PROCEDURE — 93005 ELECTROCARDIOGRAM TRACING: CPT | Performed by: STUDENT IN AN ORGANIZED HEALTH CARE EDUCATION/TRAINING PROGRAM

## 2021-09-01 ASSESSMENT — PAIN SCALES - GENERAL: PAINLEVEL_OUTOF10: 10

## 2021-09-02 LAB
ALBUMIN SERPL-MCNC: 4.3 G/DL (ref 3.6–5.1)
ALBUMIN/GLOB SERPL: 1.2 {RATIO} (ref 1–2.4)
ALP SERPL-CCNC: 72 UNITS/L (ref 45–117)
ALT SERPL-CCNC: 28 UNITS/L
ANION GAP SERPL CALC-SCNC: 13 MMOL/L (ref 10–20)
APAP SERPL-MCNC: <2 MCG/ML (ref 10–30)
AST SERPL-CCNC: 28 UNITS/L
ATRIAL RATE (BPM): 82
BASOPHILS # BLD: 0.1 K/MCL (ref 0–0.3)
BASOPHILS NFR BLD: 0 %
BILIRUB SERPL-MCNC: 1.7 MG/DL (ref 0.2–1)
BUN SERPL-MCNC: 11 MG/DL (ref 6–20)
BUN/CREAT SERPL: 11 (ref 7–25)
CALCIUM SERPL-MCNC: 8.8 MG/DL (ref 8.4–10.2)
CHLORIDE SERPL-SCNC: 107 MMOL/L (ref 98–107)
CO2 SERPL-SCNC: 24 MMOL/L (ref 21–32)
CREAT SERPL-MCNC: 1.01 MG/DL (ref 0.67–1.17)
DEPRECATED RDW RBC: 39.7 FL (ref 39–50)
EOSINOPHIL # BLD: 0 K/MCL (ref 0–0.5)
EOSINOPHIL NFR BLD: 0 %
ERYTHROCYTE [DISTWIDTH] IN BLOOD: 13 % (ref 11–15)
ETHANOL SERPL-MCNC: NORMAL MG/DL
FASTING DURATION TIME PATIENT: ABNORMAL H
GFR SERPLBLD BASED ON 1.73 SQ M-ARVRAT: >90 ML/MIN
GLOBULIN SER-MCNC: 3.7 G/DL (ref 2–4)
GLUCOSE SERPL-MCNC: 109 MG/DL (ref 65–99)
HCT VFR BLD CALC: 45.5 % (ref 39–51)
HGB BLD-MCNC: 14.8 G/DL (ref 13–17)
IMM GRANULOCYTES # BLD AUTO: 0.1 K/MCL (ref 0–0.2)
IMM GRANULOCYTES # BLD: 0 %
LYMPHOCYTES # BLD: 0.9 K/MCL (ref 1–4.8)
LYMPHOCYTES NFR BLD: 7 %
MCH RBC QN AUTO: 27.5 PG (ref 26–34)
MCHC RBC AUTO-ENTMCNC: 32.5 G/DL (ref 32–36.5)
MCV RBC AUTO: 84.4 FL (ref 78–100)
MONOCYTES # BLD: 0.5 K/MCL (ref 0.3–0.9)
MONOCYTES NFR BLD: 4 %
NEUTROPHILS # BLD: 11.8 K/MCL (ref 1.8–7.7)
NEUTROPHILS NFR BLD: 89 %
NRBC BLD MANUAL-RTO: 0 /100 WBC
P AXIS (DEGREES): 70
PLATELET # BLD AUTO: 215 K/MCL (ref 140–450)
POTASSIUM SERPL-SCNC: 3.9 MMOL/L (ref 3.4–5.1)
PR-INTERVAL (MSEC): 148
PROT SERPL-MCNC: 8 G/DL (ref 6.4–8.2)
QRS-INTERVAL (MSEC): 102
QT-INTERVAL (MSEC): 364
QTC: 425
R AXIS (DEGREES): 53
RBC # BLD: 5.39 MIL/MCL (ref 4.5–5.9)
REPORT TEXT: NORMAL
SALICYLATES SERPL-MCNC: <2.8 MG/DL
SODIUM SERPL-SCNC: 140 MMOL/L (ref 135–145)
T AXIS (DEGREES): 61
VENTRICULAR RATE EKG/MIN (BPM): 82
WBC # BLD: 13.4 K/MCL (ref 4.2–11)

## 2021-09-02 PROCEDURE — 85025 COMPLETE CBC W/AUTO DIFF WBC: CPT | Performed by: STUDENT IN AN ORGANIZED HEALTH CARE EDUCATION/TRAINING PROGRAM

## 2021-09-02 PROCEDURE — 82077 ASSAY SPEC XCP UR&BREATH IA: CPT | Performed by: STUDENT IN AN ORGANIZED HEALTH CARE EDUCATION/TRAINING PROGRAM

## 2021-09-02 PROCEDURE — 80179 DRUG ASSAY SALICYLATE: CPT | Performed by: STUDENT IN AN ORGANIZED HEALTH CARE EDUCATION/TRAINING PROGRAM

## 2021-09-02 PROCEDURE — 80143 DRUG ASSAY ACETAMINOPHEN: CPT | Performed by: STUDENT IN AN ORGANIZED HEALTH CARE EDUCATION/TRAINING PROGRAM

## 2021-09-02 PROCEDURE — 90839 PSYTX CRISIS INITIAL 60 MIN: CPT

## 2021-09-02 PROCEDURE — 80053 COMPREHEN METABOLIC PANEL: CPT | Performed by: STUDENT IN AN ORGANIZED HEALTH CARE EDUCATION/TRAINING PROGRAM

## 2021-09-02 ASSESSMENT — COGNITIVE AND FUNCTIONAL STATUS - GENERAL
MOTOR_BEHAVIOR-RETARDATION_CALCULATED: CALM AND PURPOSEFUL
ORIENTATION: ORIENTED (PERSON/PLACE/TIME)
AFFECT: IRRITABLE;ANXIOUS
MOOD: UNREMARKABLE
MEMORY: INTACT
PERCEPTUAL_MISINTERPRETATIONS_HALLUCINATIONS: CLEAR REALITY BASED PERCEPTIONS
ATTENTION_CALCULATED: MAINTAINS ATTENTION
BEHAVIOR: APPROPRIATE TO SITUATION
MOTOR_BEHAVIOR-AGITATION_CALCULATED: CALM AND PURPOSEFUL
SPEECH: RAMBLING SPEECH

## 2021-09-02 ASSESSMENT — COLUMBIA-SUICIDE SEVERITY RATING SCALE - C-SSRS
REASONS FOR IDEATION PAST MONTH: DOES NOT APPLY
TOTAL  NUMBER OF INTERRUPTED ATTEMPTS PAST 3 MONTHS: NO
6. HAVE YOU EVER DONE ANYTHING, STARTED TO DO ANYTHING, OR PREPARED TO DO ANYTHING TO END YOUR LIFE?: NO
ATTEMPT LIFETIME: NO
6. HAVE YOU EVER DONE ANYTHING, STARTED TO DO ANYTHING, OR PREPARED TO DO ANYTHING TO END YOUR LIFE?: NO
TOTAL  NUMBER OF INTERRUPTED ATTEMPTS LIFETIME: NO
TOTAL  NUMBER OF ABORTED OR SELF INTERRUPTED ATTEMPTS PAST 3 MONTHS: NO
REASONS FOR IDEATION LIFETIME: DOES NOT APPLY
ATTEMPT PAST THREE MONTHS: NO

## 2021-09-02 ASSESSMENT — LIFESTYLE VARIABLES
HOW OFTEN DO YOU HAVE 6 OR MORE DRINKS ON ONE OCCASION: NEVER
HOW OFTEN DO YOU HAVE A DRINK CONTAINING ALCOHOL: NEVER

## 2021-09-10 ASSESSMENT — ENCOUNTER SYMPTOMS
HEADACHES: 0
LIGHT-HEADEDNESS: 0
VOMITING: 0
CONFUSION: 0
NAUSEA: 0
ABDOMINAL PAIN: 0
CHILLS: 0
NUMBNESS: 0
AGITATION: 0
DIZZINESS: 0
BACK PAIN: 0
HALLUCINATIONS: 0
EYE REDNESS: 0
COUGH: 0
DIARRHEA: 0
FEVER: 0
SHORTNESS OF BREATH: 0
WEAKNESS: 0
SORE THROAT: 0

## 2022-01-10 ENCOUNTER — PATIENT OUTREACH (OUTPATIENT)
Dept: ADMINISTRATIVE | Facility: OTHER | Age: 32
End: 2022-01-10
Payer: COMMERCIAL

## 2022-01-10 ENCOUNTER — OFFICE VISIT (OUTPATIENT)
Dept: FAMILY MEDICINE | Facility: CLINIC | Age: 32
End: 2022-01-10
Payer: COMMERCIAL

## 2022-01-10 DIAGNOSIS — K42.9 UMBILICAL HERNIA WITHOUT OBSTRUCTION AND WITHOUT GANGRENE: Primary | ICD-10-CM

## 2022-01-10 PROCEDURE — 1159F MED LIST DOCD IN RCRD: CPT | Mod: CPTII,95,, | Performed by: FAMILY MEDICINE

## 2022-01-10 PROCEDURE — 1160F RVW MEDS BY RX/DR IN RCRD: CPT | Mod: CPTII,95,, | Performed by: FAMILY MEDICINE

## 2022-01-10 PROCEDURE — 1159F PR MEDICATION LIST DOCUMENTED IN MEDICAL RECORD: ICD-10-PCS | Mod: CPTII,95,, | Performed by: FAMILY MEDICINE

## 2022-01-10 PROCEDURE — 1160F PR REVIEW ALL MEDS BY PRESCRIBER/CLIN PHARMACIST DOCUMENTED: ICD-10-PCS | Mod: CPTII,95,, | Performed by: FAMILY MEDICINE

## 2022-01-10 PROCEDURE — 99214 OFFICE O/P EST MOD 30 MIN: CPT | Mod: 95,,, | Performed by: FAMILY MEDICINE

## 2022-01-10 PROCEDURE — 99214 PR OFFICE/OUTPT VISIT, EST, LEVL IV, 30-39 MIN: ICD-10-PCS | Mod: 95,,, | Performed by: FAMILY MEDICINE

## 2022-01-10 NOTE — PROGRESS NOTES
Health Maintenance Due   Topic Date Due    Hepatitis C Screening  Never done    Pneumococcal Vaccines (Age 0-64) (1 of 2 - PPSV23) Never done    Influenza Vaccine (1) 09/01/2021     Updates were requested from care everywhere.  Chart was reviewed for overdue Proactive [facility] Encounters (HERMELINDO) topics (CRS, Breast Cancer Screening, Eye exam)  Health Maintenance has been updated.  LINKS immunization registry triggered.  Immunizations were reconciled.

## 2022-01-10 NOTE — PROGRESS NOTES
" THIS DOCUMENT WAS MADE IN PART WITH VOICE RECOGNITION SOFTWARE.  OCCASIONALLY THIS SOFTWARE WILL MISINTERPRET WORDS OR PHRASES.    Assessment and Plan:    1. Umbilical hernia without obstruction and without gangrene  Concerning for strangulation potential   Urgent referral to to gen surgery   - Ambulatory referral/consult to General Surgery; Future        ______________________________________________________________________  Subjective:    Chief Complaint:  Hernia     HPI:  Felipe is a 31 y.o. year old     The patient location is: [place]    Visit type: Audiovisual    Face to Face time with patient: 15 min  20 minutes of total time spent on the encounter, which includes face to face time and non-face to face time preparing to see the patient (eg, review of tests), Obtaining and/or reviewing separately obtained history, Documenting clinical information in the electronic or other health record, Independently interpreting results (not separately reported) and communicating results to the patient/family/caregiver, or Care coordination (not separately reported).     Each patient to whom he or she provides medical services by telemedicine is:  (1) informed of the relationship between the physician and patient and the respective role of any other health care provider with respect to management of the patient; and (2) notified that he or she may decline to receive medical services by telemedicine and may withdraw from such care at any time.    Notes:     + umbilical hernia  Complains of localized pain worsened by eating  /  Associated with constipation   Reports pain slowing worsening and now with nausea   Says he is able to reduce hernia with "up and down motion"   Hernia present for several years  Occurred after lifting heavy objects leading to hernia formation         Past Medical History:  Past Medical History:   Diagnosis Date    Anxiety     Depression     Tobacco use     Tricuspid insufficiency        Past Surgical " History:  Past Surgical History:   Procedure Laterality Date    Left foot laceration         Family History:  Family History   Problem Relation Age of Onset    No Known Problems Mother     Kidney failure Father     No Known Problems Sister     No Known Problems Brother        Social History:  Social History     Socioeconomic History    Marital status: Single   Tobacco Use    Smoking status: Current Every Day Smoker     Packs/day: 1.00     Years: 12.00     Pack years: 12.00     Types: Vaping with nicotine     Start date: 2004    Smokeless tobacco: Never Used   Substance and Sexual Activity    Alcohol use: Not Currently     Comment: rarely    Drug use: Yes     Types: Marijuana     Comment: 2 weeks ago    Sexual activity: Yes     Partners: Female     Social Determinants of Health     Financial Resource Strain: Low Risk     Difficulty of Paying Living Expenses: Not very hard   Food Insecurity: Food Insecurity Present    Worried About Running Out of Food in the Last Year: Sometimes true    Ran Out of Food in the Last Year: Never true   Transportation Needs: No Transportation Needs    Lack of Transportation (Medical): No    Lack of Transportation (Non-Medical): No   Physical Activity: Insufficiently Active    Days of Exercise per Week: 3 days    Minutes of Exercise per Session: 30 min   Stress: Stress Concern Present    Feeling of Stress : To some extent   Social Connections: Unknown    Frequency of Communication with Friends and Family: Once a week    Frequency of Social Gatherings with Friends and Family: Once a week    Active Member of Clubs or Organizations: No    Attends Club or Organization Meetings: Never    Marital Status: Living with partner   Housing Stability: Low Risk     Unable to Pay for Housing in the Last Year: No    Number of Places Lived in the Last Year: 1    Unstable Housing in the Last Year: No       Medications:  Current Outpatient Medications on File Prior to Visit    Medication Sig Dispense Refill    alfuzosin (UROXATRAL) 10 mg Tb24 Take 1 tablet (10 mg total) by mouth daily with breakfast. for 10 days 10 tablet 0    ibuprofen (ADVIL,MOTRIN) 200 MG tablet Take 200 mg by mouth as needed for Pain.      ketorolac (TORADOL) 10 mg tablet Take 1 tablet (10 mg total) by mouth 3 (three) times daily as needed for Pain. 20 tablet 0    Lacto.acidophilus-Bif.animalis (PROBIOTIC) 5 billion cell CpSP Take 1 capsule by mouth once daily.      multivit-min-folic-vit K-lycop (ONE-A-DAY MEN'S MULTIVITAMIN) 400- mcg Tab Take 1 tablet by mouth once daily.      mupirocin (BACTROBAN) 2 % ointment Apply topically 2 (two) times daily. 30 g 0    NON FORMULARY MEDICATION Take 1 capsule by mouth 2 (two) times a day.      ondansetron (ZOFRAN) 4 MG tablet Take 1 tablet (4 mg total) by mouth every 6 (six) hours as needed for Nausea. 30 tablet 0    tadalafiL (CIALIS) 20 MG Tab Take 1 tablet (20 mg total) by mouth as needed (intercourse). 20 tablet 11     No current facility-administered medications on file prior to visit.       Allergies:  Patient has no known allergies.    Immunizations:  Immunization History   Administered Date(s) Administered    Meningococcal Conjugate (MCV4P) 08/22/2013    Tdap 08/22/2013       Review of Systems:  Review of Systems   Constitutional: Positive for activity change. Negative for unexpected weight change.   HENT: Positive for rhinorrhea and trouble swallowing. Negative for hearing loss.    Eyes: Negative for discharge and visual disturbance.   Respiratory: Positive for chest tightness. Negative for wheezing.    Cardiovascular: Positive for chest pain. Negative for palpitations.   Gastrointestinal: Positive for diarrhea and vomiting. Negative for blood in stool and constipation.   Endocrine: Negative for polydipsia and polyuria.   Genitourinary: Negative for difficulty urinating, hematuria and urgency.   Musculoskeletal: Positive for arthralgias and neck pain.  Negative for joint swelling.   Neurological: Positive for weakness and headaches.   Psychiatric/Behavioral: Positive for confusion and dysphoric mood.   All other systems reviewed and are negative.      Objective:    Vitals:  There were no vitals filed for this visit.    Physical Exam  Constitutional:       Appearance: He is well-developed and well-nourished.   HENT:      Head: Normocephalic and atraumatic.   Eyes:      Extraocular Movements: EOM normal.   Pulmonary:      Effort: Pulmonary effort is normal. No respiratory distress.   Musculoskeletal:      Cervical back: Normal range of motion.   Psychiatric:         Mood and Affect: Mood and affect normal.         Behavior: Behavior normal.         Thought Content: Thought content normal.         Judgment: Judgment normal.             Bhavesh Black MD  Family Medicine

## 2022-01-11 ENCOUNTER — OFFICE VISIT (OUTPATIENT)
Dept: SURGERY | Facility: CLINIC | Age: 32
End: 2022-01-11
Payer: COMMERCIAL

## 2022-01-11 ENCOUNTER — LAB VISIT (OUTPATIENT)
Dept: LAB | Facility: HOSPITAL | Age: 32
End: 2022-01-11
Attending: STUDENT IN AN ORGANIZED HEALTH CARE EDUCATION/TRAINING PROGRAM
Payer: COMMERCIAL

## 2022-01-11 VITALS
SYSTOLIC BLOOD PRESSURE: 118 MMHG | TEMPERATURE: 98 F | WEIGHT: 158.94 LBS | BODY MASS INDEX: 24.94 KG/M2 | HEART RATE: 64 BPM | DIASTOLIC BLOOD PRESSURE: 75 MMHG | HEIGHT: 67 IN

## 2022-01-11 DIAGNOSIS — Z01.818 PRE-OP TESTING: ICD-10-CM

## 2022-01-11 DIAGNOSIS — K42.9 UMBILICAL HERNIA WITHOUT OBSTRUCTION AND WITHOUT GANGRENE: ICD-10-CM

## 2022-01-11 DIAGNOSIS — K42.9 UMBILICAL HERNIA WITHOUT OBSTRUCTION AND WITHOUT GANGRENE: Primary | ICD-10-CM

## 2022-01-11 LAB
ALBUMIN SERPL BCP-MCNC: 4.5 G/DL (ref 3.5–5.2)
ALP SERPL-CCNC: 55 U/L (ref 55–135)
ALT SERPL W/O P-5'-P-CCNC: 27 U/L (ref 10–44)
ANION GAP SERPL CALC-SCNC: 5 MMOL/L (ref 8–16)
AST SERPL-CCNC: 25 U/L (ref 10–40)
BASOPHILS # BLD AUTO: 0.05 K/UL (ref 0–0.2)
BASOPHILS NFR BLD: 1.2 % (ref 0–1.9)
BILIRUB SERPL-MCNC: 0.7 MG/DL (ref 0.1–1)
BUN SERPL-MCNC: 12 MG/DL (ref 6–20)
CALCIUM SERPL-MCNC: 10.2 MG/DL (ref 8.7–10.5)
CHLORIDE SERPL-SCNC: 106 MMOL/L (ref 95–110)
CO2 SERPL-SCNC: 30 MMOL/L (ref 23–29)
CREAT SERPL-MCNC: 1.1 MG/DL (ref 0.5–1.4)
DIFFERENTIAL METHOD: ABNORMAL
EOSINOPHIL # BLD AUTO: 0.1 K/UL (ref 0–0.5)
EOSINOPHIL NFR BLD: 2.8 % (ref 0–8)
ERYTHROCYTE [DISTWIDTH] IN BLOOD BY AUTOMATED COUNT: 11.6 % (ref 11.5–14.5)
EST. GFR  (AFRICAN AMERICAN): >60 ML/MIN/1.73 M^2
EST. GFR  (NON AFRICAN AMERICAN): >60 ML/MIN/1.73 M^2
GLUCOSE SERPL-MCNC: 89 MG/DL (ref 70–110)
HCT VFR BLD AUTO: 47 % (ref 40–54)
HGB BLD-MCNC: 16.4 G/DL (ref 14–18)
IMM GRANULOCYTES # BLD AUTO: 0.01 K/UL (ref 0–0.04)
IMM GRANULOCYTES NFR BLD AUTO: 0.2 % (ref 0–0.5)
LYMPHOCYTES # BLD AUTO: 1.7 K/UL (ref 1–4.8)
LYMPHOCYTES NFR BLD: 39.9 % (ref 18–48)
MCH RBC QN AUTO: 32.2 PG (ref 27–31)
MCHC RBC AUTO-ENTMCNC: 34.9 G/DL (ref 32–36)
MCV RBC AUTO: 92 FL (ref 82–98)
MONOCYTES # BLD AUTO: 0.4 K/UL (ref 0.3–1)
MONOCYTES NFR BLD: 10.1 % (ref 4–15)
NEUTROPHILS # BLD AUTO: 2 K/UL (ref 1.8–7.7)
NEUTROPHILS NFR BLD: 45.8 % (ref 38–73)
NRBC BLD-RTO: 0 /100 WBC
PLATELET # BLD AUTO: 244 K/UL (ref 150–450)
PMV BLD AUTO: 11.4 FL (ref 9.2–12.9)
POTASSIUM SERPL-SCNC: 5.1 MMOL/L (ref 3.5–5.1)
PROT SERPL-MCNC: 7.5 G/DL (ref 6–8.4)
RBC # BLD AUTO: 5.1 M/UL (ref 4.6–6.2)
SODIUM SERPL-SCNC: 141 MMOL/L (ref 136–145)
WBC # BLD AUTO: 4.34 K/UL (ref 3.9–12.7)

## 2022-01-11 PROCEDURE — 99203 OFFICE O/P NEW LOW 30 MIN: CPT | Mod: S$GLB,,, | Performed by: STUDENT IN AN ORGANIZED HEALTH CARE EDUCATION/TRAINING PROGRAM

## 2022-01-11 PROCEDURE — 3074F PR MOST RECENT SYSTOLIC BLOOD PRESSURE < 130 MM HG: ICD-10-PCS | Mod: CPTII,S$GLB,, | Performed by: STUDENT IN AN ORGANIZED HEALTH CARE EDUCATION/TRAINING PROGRAM

## 2022-01-11 PROCEDURE — 1159F MED LIST DOCD IN RCRD: CPT | Mod: CPTII,S$GLB,, | Performed by: STUDENT IN AN ORGANIZED HEALTH CARE EDUCATION/TRAINING PROGRAM

## 2022-01-11 PROCEDURE — 99999 PR PBB SHADOW E&M-EST. PATIENT-LVL V: ICD-10-PCS | Mod: PBBFAC,,, | Performed by: STUDENT IN AN ORGANIZED HEALTH CARE EDUCATION/TRAINING PROGRAM

## 2022-01-11 PROCEDURE — 99999 PR PBB SHADOW E&M-EST. PATIENT-LVL V: CPT | Mod: PBBFAC,,, | Performed by: STUDENT IN AN ORGANIZED HEALTH CARE EDUCATION/TRAINING PROGRAM

## 2022-01-11 PROCEDURE — 3078F DIAST BP <80 MM HG: CPT | Mod: CPTII,S$GLB,, | Performed by: STUDENT IN AN ORGANIZED HEALTH CARE EDUCATION/TRAINING PROGRAM

## 2022-01-11 PROCEDURE — 3008F PR BODY MASS INDEX (BMI) DOCUMENTED: ICD-10-PCS | Mod: CPTII,S$GLB,, | Performed by: STUDENT IN AN ORGANIZED HEALTH CARE EDUCATION/TRAINING PROGRAM

## 2022-01-11 PROCEDURE — 3078F PR MOST RECENT DIASTOLIC BLOOD PRESSURE < 80 MM HG: ICD-10-PCS | Mod: CPTII,S$GLB,, | Performed by: STUDENT IN AN ORGANIZED HEALTH CARE EDUCATION/TRAINING PROGRAM

## 2022-01-11 PROCEDURE — 99203 PR OFFICE/OUTPT VISIT, NEW, LEVL III, 30-44 MIN: ICD-10-PCS | Mod: S$GLB,,, | Performed by: STUDENT IN AN ORGANIZED HEALTH CARE EDUCATION/TRAINING PROGRAM

## 2022-01-11 PROCEDURE — 36415 COLL VENOUS BLD VENIPUNCTURE: CPT | Mod: PO | Performed by: STUDENT IN AN ORGANIZED HEALTH CARE EDUCATION/TRAINING PROGRAM

## 2022-01-11 PROCEDURE — 1159F PR MEDICATION LIST DOCUMENTED IN MEDICAL RECORD: ICD-10-PCS | Mod: CPTII,S$GLB,, | Performed by: STUDENT IN AN ORGANIZED HEALTH CARE EDUCATION/TRAINING PROGRAM

## 2022-01-11 PROCEDURE — 3008F BODY MASS INDEX DOCD: CPT | Mod: CPTII,S$GLB,, | Performed by: STUDENT IN AN ORGANIZED HEALTH CARE EDUCATION/TRAINING PROGRAM

## 2022-01-11 PROCEDURE — 85025 COMPLETE CBC W/AUTO DIFF WBC: CPT | Performed by: STUDENT IN AN ORGANIZED HEALTH CARE EDUCATION/TRAINING PROGRAM

## 2022-01-11 PROCEDURE — 3074F SYST BP LT 130 MM HG: CPT | Mod: CPTII,S$GLB,, | Performed by: STUDENT IN AN ORGANIZED HEALTH CARE EDUCATION/TRAINING PROGRAM

## 2022-01-11 PROCEDURE — 80053 COMPREHEN METABOLIC PANEL: CPT | Performed by: STUDENT IN AN ORGANIZED HEALTH CARE EDUCATION/TRAINING PROGRAM

## 2022-01-11 RX ORDER — SODIUM CHLORIDE 9 MG/ML
INJECTION, SOLUTION INTRAVENOUS CONTINUOUS
Status: CANCELLED | OUTPATIENT
Start: 2022-01-11

## 2022-01-11 RX ORDER — FLUOXETINE HYDROCHLORIDE 20 MG/1
1 CAPSULE ORAL DAILY
COMMUNITY
End: 2023-03-29

## 2022-01-11 RX ORDER — ARIPIPRAZOLE 2 MG/1
1 TABLET ORAL DAILY
COMMUNITY
End: 2022-08-14

## 2022-01-11 NOTE — PATIENT INSTRUCTIONS
Surgery is scheduled for 1/18/21 arrival time will be given by the the preop nurse.  The preop nurse will call you from [phone]  Nothing to eat or drink after midnight.  Someone to drive you home.    THE PREOP NURSE WILL CALL, SOMETIMES AS LATE AS 4 or 5 PM IN THE AFTERNOON THE DAY BEFORE SURGERY.    Bathe the night before and the morning of your procedure with a Chlorhexidine wash such as Hibiclens, can be purchased at most Pharmacy's no prescription needed.    Special Instruction:  Your surgery is scheduled at the [facility] at [address] [facility] [address] in [place] on the first floor.     Contact Aj Kraus LPN for any questions or concerns. [phone]    COVID-19 Test 72 hours before procedure.

## 2022-01-11 NOTE — PROGRESS NOTES
History & Physical    SUBJECTIVE:     History of Present Illness:  Patient is a 31 y.o. male presents with   A small symptomatic umbilical defect.  The area is reducible.  It is causing pain.  No signs of obstruction or strangulation.    Chief Complaint   Patient presents with    Consult            Review of patient's allergies indicates:  No Known Allergies    Current Outpatient Medications   Medication Sig Dispense Refill    Lacto.acidophilus-Bif.animalis 5 billion cell CpSP Take 1 capsule by mouth once daily.      multivit-min-folic-vit K-lycop 400- mcg Tab Take 1 tablet by mouth once daily.      ARIPiprazole (ABILIFY) 2 MG Tab Take 1 tablet by mouth once daily.      FLUoxetine 20 MG capsule Take 1 capsule by mouth once daily.      ibuprofen (ADVIL,MOTRIN) 200 MG tablet Take 200 mg by mouth as needed for Pain.      tadalafiL (CIALIS) 20 MG Tab Take 1 tablet (20 mg total) by mouth as needed (intercourse). (Patient not taking: Reported on 1/11/2022) 20 tablet 11     No current facility-administered medications for this visit.       Past Medical History:   Diagnosis Date    Anxiety     Depression     Tobacco use     Tricuspid insufficiency      Past Surgical History:   Procedure Laterality Date    Left foot laceration       Family History   Problem Relation Age of Onset    No Known Problems Mother     Kidney failure Father     No Known Problems Sister     No Known Problems Brother      Social History     Tobacco Use    Smoking status: Current Every Day Smoker     Packs/day: 1.00     Years: 12.00     Pack years: 12.00     Types: Vaping with nicotine     Start date: 2004    Smokeless tobacco: Never Used   Substance Use Topics    Alcohol use: Not Currently     Comment: rarely    Drug use: Yes     Types: Marijuana     Comment: 2 weeks ago        Review of Systems:  Review of Systems   Constitutional: Negative.  Negative for fatigue and fever.   HENT: Negative.    Eyes: Negative.   "  Respiratory: Negative.  Negative for shortness of breath.    Cardiovascular: Negative.  Negative for chest pain.   Gastrointestinal: Positive for abdominal pain.   Endocrine: Negative.    Genitourinary: Negative.    Musculoskeletal: Negative.    Skin: Negative.    Allergic/Immunologic: Negative.    Neurological: Negative.    Hematological: Negative.    Psychiatric/Behavioral: Negative.        OBJECTIVE:     Vital Signs (Most Recent)  Temp: 98 °F (36.7 °C) (01/11/22 0941)  Pulse: 64 (01/11/22 0941)  BP: 118/75 (01/11/22 0941)  5' 7" (1.702 m)  72.1 kg (158 lb 15.2 oz)     Physical Exam:  Physical Exam  Constitutional:       General: He is not in acute distress.     Appearance: Normal appearance. He is not ill-appearing, toxic-appearing or diaphoretic.   HENT:      Head: Normocephalic.      Nose: Nose normal.   Eyes:      Conjunctiva/sclera: Conjunctivae normal.   Cardiovascular:      Rate and Rhythm: Normal rate and regular rhythm.   Pulmonary:      Effort: Pulmonary effort is normal.   Abdominal:      Palpations: Abdomen is soft.      Comments: Small reducible, fat containing, umbilical defect, just superior to the umbilicus.   Musculoskeletal:         General: Normal range of motion.      Cervical back: Normal range of motion.   Skin:     General: Skin is warm.   Neurological:      General: No focal deficit present.      Mental Status: He is alert.   Psychiatric:         Mood and Affect: Mood normal.         Laboratory    None recent      ASSESSMENT/PLAN:      31-year-old male with a small fat containing and reducible umbilical hernia.    It is symptomatic.  He desires operative repair.    PLAN:    Risks and benefits of surgery were discussed.  Given the small defect size, approximately 1 cm, and his age  Recommend open primary repair.  Consent was obtained.  Will schedule for surgery next Tuesday.  Preoperative labs.       "

## 2022-01-14 ENCOUNTER — TELEPHONE (OUTPATIENT)
Dept: SURGERY | Facility: CLINIC | Age: 32
End: 2022-01-14
Payer: COMMERCIAL

## 2022-01-14 ENCOUNTER — ANESTHESIA EVENT (OUTPATIENT)
Dept: SURGERY | Facility: HOSPITAL | Age: 32
End: 2022-01-14
Payer: COMMERCIAL

## 2022-01-14 NOTE — TELEPHONE ENCOUNTER
----- Message from Monik Liz RN sent at 1/14/2022 11:13 AM CST -----  Patient states he is still taking ibuprofen. Please call patient and advise if this will interfere with his procedure on 1/18. Thank you!

## 2022-01-14 NOTE — TELEPHONE ENCOUNTER
Spoke with patient, advised it is preferred that he stop the Ibuprofen due to the blood thinning properties.

## 2022-01-15 ENCOUNTER — LAB VISIT (OUTPATIENT)
Dept: FAMILY MEDICINE | Facility: CLINIC | Age: 32
End: 2022-01-15
Payer: COMMERCIAL

## 2022-01-15 DIAGNOSIS — Z01.818 PRE-OP TESTING: ICD-10-CM

## 2022-01-15 PROCEDURE — U0005 INFEC AGEN DETEC AMPLI PROBE: HCPCS | Performed by: STUDENT IN AN ORGANIZED HEALTH CARE EDUCATION/TRAINING PROGRAM

## 2022-01-15 PROCEDURE — U0003 INFECTIOUS AGENT DETECTION BY NUCLEIC ACID (DNA OR RNA); SEVERE ACUTE RESPIRATORY SYNDROME CORONAVIRUS 2 (SARS-COV-2) (CORONAVIRUS DISEASE [COVID-19]), AMPLIFIED PROBE TECHNIQUE, MAKING USE OF HIGH THROUGHPUT TECHNOLOGIES AS DESCRIBED BY CMS-2020-01-R: HCPCS | Performed by: STUDENT IN AN ORGANIZED HEALTH CARE EDUCATION/TRAINING PROGRAM

## 2022-01-16 LAB
SARS-COV-2 RNA RESP QL NAA+PROBE: NOT DETECTED
SARS-COV-2- CYCLE NUMBER: NORMAL

## 2022-01-18 ENCOUNTER — ANESTHESIA (OUTPATIENT)
Dept: SURGERY | Facility: HOSPITAL | Age: 32
End: 2022-01-18
Payer: COMMERCIAL

## 2022-01-18 ENCOUNTER — HOSPITAL ENCOUNTER (OUTPATIENT)
Facility: HOSPITAL | Age: 32
Discharge: HOME | End: 2022-01-18
Attending: STUDENT IN AN ORGANIZED HEALTH CARE EDUCATION/TRAINING PROGRAM | Admitting: STUDENT IN AN ORGANIZED HEALTH CARE EDUCATION/TRAINING PROGRAM
Payer: COMMERCIAL

## 2022-01-18 VITALS
WEIGHT: 158 LBS | HEART RATE: 69 BPM | HEIGHT: 67 IN | OXYGEN SATURATION: 98 % | RESPIRATION RATE: 14 BRPM | DIASTOLIC BLOOD PRESSURE: 75 MMHG | SYSTOLIC BLOOD PRESSURE: 122 MMHG | TEMPERATURE: 98 F | BODY MASS INDEX: 24.8 KG/M2

## 2022-01-18 DIAGNOSIS — K42.9 UMBILICAL HERNIA WITHOUT OBSTRUCTION AND WITHOUT GANGRENE: Primary | ICD-10-CM

## 2022-01-18 PROCEDURE — 36000706: Mod: PO | Performed by: STUDENT IN AN ORGANIZED HEALTH CARE EDUCATION/TRAINING PROGRAM

## 2022-01-18 PROCEDURE — 71000039 HC RECOVERY, EACH ADD'L HOUR: Mod: PO | Performed by: STUDENT IN AN ORGANIZED HEALTH CARE EDUCATION/TRAINING PROGRAM

## 2022-01-18 PROCEDURE — 63600175 PHARM REV CODE 636 W HCPCS: Mod: PO | Performed by: STUDENT IN AN ORGANIZED HEALTH CARE EDUCATION/TRAINING PROGRAM

## 2022-01-18 PROCEDURE — 63600175 PHARM REV CODE 636 W HCPCS: Mod: PO | Performed by: ANESTHESIOLOGY

## 2022-01-18 PROCEDURE — D9220A PRA ANESTHESIA: ICD-10-PCS | Mod: CRNA,,, | Performed by: NURSE ANESTHETIST, CERTIFIED REGISTERED

## 2022-01-18 PROCEDURE — 36000707: Mod: PO | Performed by: STUDENT IN AN ORGANIZED HEALTH CARE EDUCATION/TRAINING PROGRAM

## 2022-01-18 PROCEDURE — 49585 PR REPAIR UMBILICAL HERN,5+Y/O,REDUC: CPT | Mod: ,,, | Performed by: STUDENT IN AN ORGANIZED HEALTH CARE EDUCATION/TRAINING PROGRAM

## 2022-01-18 PROCEDURE — 25000003 PHARM REV CODE 250: Mod: PO | Performed by: STUDENT IN AN ORGANIZED HEALTH CARE EDUCATION/TRAINING PROGRAM

## 2022-01-18 PROCEDURE — C1729 CATH, DRAINAGE: HCPCS | Mod: PO | Performed by: STUDENT IN AN ORGANIZED HEALTH CARE EDUCATION/TRAINING PROGRAM

## 2022-01-18 PROCEDURE — 71000033 HC RECOVERY, INTIAL HOUR: Mod: PO | Performed by: STUDENT IN AN ORGANIZED HEALTH CARE EDUCATION/TRAINING PROGRAM

## 2022-01-18 PROCEDURE — D9220A PRA ANESTHESIA: ICD-10-PCS | Mod: ANES,,, | Performed by: ANESTHESIOLOGY

## 2022-01-18 PROCEDURE — 25000003 PHARM REV CODE 250: Mod: PO | Performed by: NURSE ANESTHETIST, CERTIFIED REGISTERED

## 2022-01-18 PROCEDURE — 37000008 HC ANESTHESIA 1ST 15 MINUTES: Mod: PO | Performed by: STUDENT IN AN ORGANIZED HEALTH CARE EDUCATION/TRAINING PROGRAM

## 2022-01-18 PROCEDURE — D9220A PRA ANESTHESIA: Mod: ANES,,, | Performed by: ANESTHESIOLOGY

## 2022-01-18 PROCEDURE — 63600175 PHARM REV CODE 636 W HCPCS: Mod: PO | Performed by: NURSE ANESTHETIST, CERTIFIED REGISTERED

## 2022-01-18 PROCEDURE — 37000009 HC ANESTHESIA EA ADD 15 MINS: Mod: PO | Performed by: STUDENT IN AN ORGANIZED HEALTH CARE EDUCATION/TRAINING PROGRAM

## 2022-01-18 PROCEDURE — 49585 PR REPAIR UMBILICAL HERN,5+Y/O,REDUC: ICD-10-PCS | Mod: ,,, | Performed by: STUDENT IN AN ORGANIZED HEALTH CARE EDUCATION/TRAINING PROGRAM

## 2022-01-18 PROCEDURE — D9220A PRA ANESTHESIA: Mod: CRNA,,, | Performed by: NURSE ANESTHETIST, CERTIFIED REGISTERED

## 2022-01-18 RX ORDER — MIDAZOLAM HYDROCHLORIDE 1 MG/ML
INJECTION, SOLUTION INTRAMUSCULAR; INTRAVENOUS
Status: DISCONTINUED | OUTPATIENT
Start: 2022-01-18 | End: 2022-01-18

## 2022-01-18 RX ORDER — ONDANSETRON 2 MG/ML
INJECTION INTRAMUSCULAR; INTRAVENOUS
Status: DISCONTINUED | OUTPATIENT
Start: 2022-01-18 | End: 2022-01-18

## 2022-01-18 RX ORDER — BUPIVACAINE HCL/EPINEPHRINE 0.25-.0005
VIAL (ML) INJECTION
Status: DISCONTINUED | OUTPATIENT
Start: 2022-01-18 | End: 2022-01-18 | Stop reason: HOSPADM

## 2022-01-18 RX ORDER — LIDOCAINE HYDROCHLORIDE 10 MG/ML
1 INJECTION, SOLUTION EPIDURAL; INFILTRATION; INTRACAUDAL; PERINEURAL ONCE
Status: DISCONTINUED | OUTPATIENT
Start: 2022-01-18 | End: 2022-01-18 | Stop reason: HOSPADM

## 2022-01-18 RX ORDER — CEFAZOLIN SODIUM 2 G/50ML
2 SOLUTION INTRAVENOUS
Status: DISCONTINUED | OUTPATIENT
Start: 2022-01-18 | End: 2022-01-18 | Stop reason: HOSPADM

## 2022-01-18 RX ORDER — LIDOCAINE HYDROCHLORIDE 20 MG/ML
INJECTION INTRAVENOUS
Status: DISCONTINUED | OUTPATIENT
Start: 2022-01-18 | End: 2022-01-18

## 2022-01-18 RX ORDER — FENTANYL CITRATE 50 UG/ML
25 INJECTION, SOLUTION INTRAMUSCULAR; INTRAVENOUS EVERY 5 MIN PRN
Status: DISCONTINUED | OUTPATIENT
Start: 2022-01-18 | End: 2022-01-18 | Stop reason: HOSPADM

## 2022-01-18 RX ORDER — OXYCODONE AND ACETAMINOPHEN 5; 325 MG/1; MG/1
1-2 TABLET ORAL EVERY 4 HOURS PRN
Qty: 15 TABLET | Refills: 0 | Status: SHIPPED | OUTPATIENT
Start: 2022-01-18 | End: 2022-08-14

## 2022-01-18 RX ORDER — OXYCODONE HYDROCHLORIDE 5 MG/1
5 TABLET ORAL
Status: DISCONTINUED | OUTPATIENT
Start: 2022-01-18 | End: 2022-01-18 | Stop reason: HOSPADM

## 2022-01-18 RX ORDER — SODIUM CHLORIDE, SODIUM LACTATE, POTASSIUM CHLORIDE, CALCIUM CHLORIDE 600; 310; 30; 20 MG/100ML; MG/100ML; MG/100ML; MG/100ML
INJECTION, SOLUTION INTRAVENOUS CONTINUOUS
Status: DISCONTINUED | OUTPATIENT
Start: 2022-01-18 | End: 2022-01-18 | Stop reason: HOSPADM

## 2022-01-18 RX ORDER — HYDROMORPHONE HYDROCHLORIDE 2 MG/ML
0.2 INJECTION, SOLUTION INTRAMUSCULAR; INTRAVENOUS; SUBCUTANEOUS EVERY 5 MIN PRN
Status: DISCONTINUED | OUTPATIENT
Start: 2022-01-18 | End: 2022-01-18 | Stop reason: HOSPADM

## 2022-01-18 RX ORDER — ACETAMINOPHEN 10 MG/ML
INJECTION, SOLUTION INTRAVENOUS
Status: DISCONTINUED | OUTPATIENT
Start: 2022-01-18 | End: 2022-01-18

## 2022-01-18 RX ORDER — FENTANYL CITRATE 50 UG/ML
INJECTION, SOLUTION INTRAMUSCULAR; INTRAVENOUS
Status: DISCONTINUED | OUTPATIENT
Start: 2022-01-18 | End: 2022-01-18

## 2022-01-18 RX ORDER — SODIUM CHLORIDE 9 MG/ML
INJECTION, SOLUTION INTRAVENOUS CONTINUOUS
Status: DISCONTINUED | OUTPATIENT
Start: 2022-01-18 | End: 2022-01-18 | Stop reason: HOSPADM

## 2022-01-18 RX ORDER — PROPOFOL 10 MG/ML
VIAL (ML) INTRAVENOUS
Status: DISCONTINUED | OUTPATIENT
Start: 2022-01-18 | End: 2022-01-18

## 2022-01-18 RX ORDER — METOCLOPRAMIDE HYDROCHLORIDE 5 MG/ML
10 INJECTION INTRAMUSCULAR; INTRAVENOUS EVERY 10 MIN PRN
Status: DISCONTINUED | OUTPATIENT
Start: 2022-01-18 | End: 2022-01-18 | Stop reason: HOSPADM

## 2022-01-18 RX ORDER — SODIUM CHLORIDE 0.9 % (FLUSH) 0.9 %
10 SYRINGE (ML) INJECTION
Status: DISCONTINUED | OUTPATIENT
Start: 2022-01-18 | End: 2022-01-18 | Stop reason: HOSPADM

## 2022-01-18 RX ORDER — ROCURONIUM BROMIDE 10 MG/ML
INJECTION, SOLUTION INTRAVENOUS
Status: DISCONTINUED | OUTPATIENT
Start: 2022-01-18 | End: 2022-01-18

## 2022-01-18 RX ADMIN — FENTANYL CITRATE 25 MCG: 50 INJECTION INTRAMUSCULAR; INTRAVENOUS at 02:01

## 2022-01-18 RX ADMIN — LIDOCAINE HYDROCHLORIDE 100 MG: 20 INJECTION, SOLUTION INTRAVENOUS at 01:01

## 2022-01-18 RX ADMIN — ROCURONIUM BROMIDE 30 MG: 10 INJECTION, SOLUTION INTRAVENOUS at 01:01

## 2022-01-18 RX ADMIN — CEFAZOLIN SODIUM 2 G: 1 INJECTION, POWDER, FOR SOLUTION INTRAMUSCULAR; INTRAVENOUS at 01:01

## 2022-01-18 RX ADMIN — MIDAZOLAM HYDROCHLORIDE 2 MG: 1 INJECTION, SOLUTION INTRAMUSCULAR; INTRAVENOUS at 01:01

## 2022-01-18 RX ADMIN — FENTANYL CITRATE 50 MCG: 50 INJECTION, SOLUTION INTRAMUSCULAR; INTRAVENOUS at 01:01

## 2022-01-18 RX ADMIN — SODIUM CHLORIDE, SODIUM LACTATE, POTASSIUM CHLORIDE, AND CALCIUM CHLORIDE: .6; .31; .03; .02 INJECTION, SOLUTION INTRAVENOUS at 12:01

## 2022-01-18 RX ADMIN — SUGAMMADEX 200 MG: 100 INJECTION, SOLUTION INTRAVENOUS at 01:01

## 2022-01-18 RX ADMIN — ONDANSETRON 4 MG: 2 INJECTION INTRAMUSCULAR; INTRAVENOUS at 01:01

## 2022-01-18 RX ADMIN — PROPOFOL 200 MG: 10 INJECTION, EMULSION INTRAVENOUS at 01:01

## 2022-01-18 RX ADMIN — ACETAMINOPHEN 1000 MG: 10 INJECTION, SOLUTION INTRAVENOUS at 01:01

## 2022-01-18 NOTE — ANESTHESIA PREPROCEDURE EVALUATION
01/18/2022  Felipe Nava is a 31 y.o., male.    Anesthesia Evaluation    I have reviewed the Patient Summary Reports.    I have reviewed the Nursing Notes. I have reviewed the NPO Status.   I have reviewed the Medications.     Review of Systems  Social:  Former Smoker    Cardiovascular:   Exercise tolerance: good Valvular problems/Murmurs    Pulmonary:  Pulmonary Normal    Renal/:  Renal/ Normal     Hepatic/GI:   IH   Neurological:  Neurology Normal    Endocrine:  Endocrine Normal    Psych:   Psychiatric History anxiety          Physical Exam  General:  Well nourished    Airway/Jaw/Neck:  Airway Findings: Mouth Opening: Normal Tongue: Normal  General Airway Assessment: Adult  Mallampati: II      Dental:  Dental Findings: In tact   Chest/Lungs:  Chest/Lungs Findings: Clear to auscultation, Normal Respiratory Rate     Heart/Vascular:  Heart Findings: Rate: Normal  Rhythm: Regular Rhythm        Mental Status:  Mental Status Findings:  Cooperative, Alert and Oriented         Anesthesia Plan  Type of Anesthesia, risks & benefits discussed:  Anesthesia Type:  general    Patient's Preference:   Plan Factors:          Intra-op Monitoring Plan: standard ASA monitors  Intra-op Monitoring Plan Comments:   Post Op Pain Control Plan: IV/PO Opioids PRN and multimodal analgesia  Post Op Pain Control Plan Comments:     Induction:   Inhalation and IV  Beta Blocker:  Patient is not currently on a Beta-Blocker (No further documentation required).       Informed Consent: Patient understands risks and agrees with Anesthesia plan.  Questions answered. Anesthesia consent signed with patient.  ASA Score: 2     Day of Surgery Review of History & Physical: I have interviewed and examined the patient. I have reviewed the patient's H&P dated:            Ready For Surgery From Anesthesia Perspective.

## 2022-01-18 NOTE — DISCHARGE SUMMARY
[facility] - Surgery  Brief Operative Note    Surgery Date: 1/18/2022     Surgeon(s) and Role:     * Renny Messina MD - Primary    Assisting Surgeon: None    Pre-op Diagnosis:  Umbilical hernia without obstruction and without gangrene [K42.9]    Post-op Diagnosis:  Post-Op Diagnosis Codes:     * Umbilical hernia without obstruction and without gangrene [K42.9]    Procedure(s) (LRB):  REPAIR, HERNIA, UMBILICAL, AGE 5 YEARS OR OLDER (N/A)    Anesthesia: General    Operative Findings: 1 cm umbilical defect, repaired primarily    Estimated Blood Loss: min    Specimens:   Specimen (24h ago, onward)            None            Discharge Note    OUTCOME: Patient tolerated treatment/procedure well without complication and is now ready for discharge.    DISPOSITION: Home or Self Care    FINAL DIAGNOSIS:  Umbilical hernia without obstruction and without gangrene    FOLLOWUP: In clinic    DISCHARGE INSTRUCTIONS:    Discharge Procedure Orders   Diet Adult Regular     Lifting restrictions   Order Comments: No more than 10 lbs     No driving until:   Order Comments: Off narcotics     Notify your health care provider if you experience any of the following:  worsening rash     Notify your health care provider if you experience any of the following:  redness, tenderness, or signs of infection (pain, swelling, redness, odor or green/yellow discharge around incision site)     Notify your health care provider if you experience any of the following:  severe uncontrolled pain     Notify your health care provider if you experience any of the following:  temperature >100.4     Notify your health care provider if you experience any of the following:  persistent nausea and vomiting or diarrhea     No dressing needed   Order Comments: Okay to shower.  No swimming or soaking for 2 weeks.

## 2022-01-18 NOTE — TRANSFER OF CARE
"Anesthesia Transfer of Care Note    Patient: Felipe Nava    Procedure(s) Performed: Procedure(s) (LRB):  REPAIR, HERNIA, UMBILICAL, AGE 5 YEARS OR OLDER (N/A)    Patient location: PACU    Anesthesia Type: general    Transport from OR: Transported from OR on room air with adequate spontaneous ventilation    Post pain: adequate analgesia    Post assessment: no apparent anesthetic complications and tolerated procedure well    Post vital signs: stable    Level of consciousness: responds to stimulation    Nausea/Vomiting: no nausea/vomiting    Complications: none    Transfer of care protocol was followed      Last vitals:   Visit Vitals  /66 (BP Location: Right arm, Patient Position: Sitting)   Pulse 68   Temp 36.8 °C (98.2 °F) (Skin)   Resp 16   Ht 5' 7" (1.702 m)   Wt 71.7 kg (158 lb)   SpO2 97%   BMI 24.75 kg/m²     "

## 2022-01-18 NOTE — ANESTHESIA PROCEDURE NOTES
Intubation    Date/Time: 1/18/2022 1:29 PM  Performed by: Cally Dorsey CRNA  Authorized by: Felipe Marshall MD     Intubation:     Induction:  Intravenous    Intubated:  Postinduction    Mask Ventilation:  Easy mask    Attempts:  1    Attempted By:  CRNA    Method of Intubation:  Video laryngoscopy    Blade:  Figueroa 4    Laryngeal View Grade: Grade I - full view of cords      Difficult Airway Encountered?: No      Complications:  None    Airway Device:  Oral endotracheal tube    Airway Device Size:  8.0    Style/Cuff Inflation:  Cuffed (inflated to minimal occlusive pressure)    Inflation Amount (mL):  10    Secured at:  The lips    Placement Verified By:  Capnometry    Complicating Factors:  None    Findings Post-Intubation:  BS equal bilateral

## 2022-01-18 NOTE — DISCHARGE INSTRUCTIONS
Department of General Surgery  [facility]   HERNIA REPAIR    DOS:     Umbilical/Ventral hernia: no support needed   Minimal activity for 48 hours   Advance diet as tolerated.   May shower in 48 hours   May remove outer dressing in 48 hours.   Apply ice pack for 24 hours on and off  for 30 minute increments for comfort and to decrease swelling.   Resume home medications as prescribed.    DONT:   Do not lift anything over 15 pounds until you have been released by your physician.   Do not soak in tub   No driving for 24 hours or while taking narcotic pain medication.   DO NOT TAKE ADDITIONAL TYLENOL/ACETAMINOPHEN WHILE TAKING NARCOTIC PAIN MEDICINE THAT CONTAINS TYLENOL/ACETAMINOPHEN.    CALL PHYSICIAN FOR:   Redness, swelling, or bleeding from surgical site   Fever greater then 101...   Drainage from the incision site that appears infected.   Persistent pain not relieved by pain medication.    RETURN APPOINTMENT: Call to schedule appointment in 10-14 days    FOR EMERGENCIES:  Call your doctor at

## 2022-01-18 NOTE — ANESTHESIA POSTPROCEDURE EVALUATION
Anesthesia Post Evaluation    Patient: Felipe Nava    Procedure(s) Performed: Procedure(s) (LRB):  REPAIR, HERNIA, UMBILICAL, AGE 5 YEARS OR OLDER (N/A)    Final Anesthesia Type: general      Patient location during evaluation: PACU  Patient participation: Yes- Able to Participate  Level of consciousness: sedated and awake  Post-procedure vital signs: reviewed and stable  Pain management: adequate  Airway patency: patent    PONV status at discharge: No PONV  Anesthetic complications: no      Cardiovascular status: blood pressure returned to baseline  Respiratory status: spontaneous ventilation  Hydration status: euvolemic  Follow-up not needed.          Vitals Value Taken Time   /76 01/18/22 1425   Temp 36.6 °C (97.9 °F) 01/18/22 1415   Pulse 81 01/18/22 1425   Resp 16 01/18/22 1433   SpO2 98 % 01/18/22 1425         No case tracking events are documented in the log.      Pain/Johanne Score: Pain Rating Prior to Med Admin: 7 (1/18/2022  2:33 PM)

## 2022-01-18 NOTE — OP NOTE
[facility] [facility] - Surgery  Surgery Department  Operative Note    SUMMARY     Date of Procedure: 1/18/2022     Procedure: Procedure(s) (LRB):  REPAIR, HERNIA, UMBILICAL, AGE 5 YEARS OR OLDER (N/A)     Surgeon(s) and Role:     * Renny Messina MD - Primary    Assisting Surgeon: None    Pre-Operative Diagnosis: Umbilical hernia without obstruction and without gangrene [K42.9]    Post-Operative Diagnosis: Post-Op Diagnosis Codes:     * Umbilical hernia without obstruction and without gangrene [K42.9]    Anesthesia: General    Operative Findings (including complications, if any):  Herniating fat in a 1 cm umbilical hernia defect.  Primary closure.    Description of Technical Procedures:  This is a 31-year-old male who presented with a symptomatic umbilical bulge.  He did consent to operative repair.  He was taken to the OR, placed supine, general endotracheal anesthesia was induced, the abdomen was prepped and draped in the usual fashion, a time-out was completed.  Repair in a very small umbilical hernia defect could be palpated at the superior aspect of the umbilicus.  It was reducible.  A 1.5 cm incision was made at this location vertically.  Deep tissues were divided with electrocautery down to a hernia sac.  The sac as well as some herniating fat were excised.  The defect was too small to repair with mesh and we had discussed primary repair to review in the office.  I elected to close the defect with two, 2-0 interrupted Ethibond sutures.  Skin was then closed over the repair with interrupted 4-0 Monocryl.  Dermabond and a pressure dressing were applied.  Patient tolerated the entire procedure without apparent complication, was extubated in the OR, brought to recovery in stable condition.  All counts were correct.    Significant Surgical Tasks Conducted by the Assistant(s), if Applicable: n/a    Estimated Blood Loss (EBL): min           Implants: * No implants in log *    Specimens:   Specimen (24h ago, onward)             None                  Condition: Good    Disposition: PACU - hemodynamically stable.    Attestation: I was present and scrubbed for the entire procedure.

## 2022-02-22 ENCOUNTER — OFFICE VISIT (OUTPATIENT)
Dept: SURGERY | Facility: CLINIC | Age: 32
End: 2022-02-22
Payer: COMMERCIAL

## 2022-02-22 VITALS — WEIGHT: 159.19 LBS | TEMPERATURE: 98 F | BODY MASS INDEX: 24.99 KG/M2 | HEIGHT: 67 IN

## 2022-02-22 DIAGNOSIS — Z98.890 POST-OPERATIVE STATE: Primary | ICD-10-CM

## 2022-02-22 PROCEDURE — 99024 POSTOP FOLLOW-UP VISIT: CPT | Mod: S$GLB,,, | Performed by: STUDENT IN AN ORGANIZED HEALTH CARE EDUCATION/TRAINING PROGRAM

## 2022-02-22 PROCEDURE — 1159F PR MEDICATION LIST DOCUMENTED IN MEDICAL RECORD: ICD-10-PCS | Mod: CPTII,S$GLB,, | Performed by: STUDENT IN AN ORGANIZED HEALTH CARE EDUCATION/TRAINING PROGRAM

## 2022-02-22 PROCEDURE — 99024 PR POST-OP FOLLOW-UP VISIT: ICD-10-PCS | Mod: S$GLB,,, | Performed by: STUDENT IN AN ORGANIZED HEALTH CARE EDUCATION/TRAINING PROGRAM

## 2022-02-22 PROCEDURE — 3008F PR BODY MASS INDEX (BMI) DOCUMENTED: ICD-10-PCS | Mod: CPTII,S$GLB,, | Performed by: STUDENT IN AN ORGANIZED HEALTH CARE EDUCATION/TRAINING PROGRAM

## 2022-02-22 PROCEDURE — 99999 PR PBB SHADOW E&M-EST. PATIENT-LVL III: ICD-10-PCS | Mod: PBBFAC,,, | Performed by: STUDENT IN AN ORGANIZED HEALTH CARE EDUCATION/TRAINING PROGRAM

## 2022-02-22 PROCEDURE — 1159F MED LIST DOCD IN RCRD: CPT | Mod: CPTII,S$GLB,, | Performed by: STUDENT IN AN ORGANIZED HEALTH CARE EDUCATION/TRAINING PROGRAM

## 2022-02-22 PROCEDURE — 99999 PR PBB SHADOW E&M-EST. PATIENT-LVL III: CPT | Mod: PBBFAC,,, | Performed by: STUDENT IN AN ORGANIZED HEALTH CARE EDUCATION/TRAINING PROGRAM

## 2022-02-22 PROCEDURE — 3008F BODY MASS INDEX DOCD: CPT | Mod: CPTII,S$GLB,, | Performed by: STUDENT IN AN ORGANIZED HEALTH CARE EDUCATION/TRAINING PROGRAM

## 2022-02-22 NOTE — PROGRESS NOTES
Postop note    Patient underwent open repair of a small umbilical defect.  He is doing well.    Incisions healing well.  Possible small suture granuloma at the superior aspect of his incision.    Overall doing well.  Follow-up with me as needed.

## 2022-08-02 ENCOUNTER — OFFICE VISIT (OUTPATIENT)
Dept: FAMILY MEDICINE | Facility: CLINIC | Age: 32
End: 2022-08-02
Payer: COMMERCIAL

## 2022-08-02 VITALS
WEIGHT: 159.06 LBS | RESPIRATION RATE: 12 BRPM | TEMPERATURE: 98 F | BODY MASS INDEX: 24.91 KG/M2 | DIASTOLIC BLOOD PRESSURE: 78 MMHG | SYSTOLIC BLOOD PRESSURE: 128 MMHG | HEART RATE: 109 BPM | OXYGEN SATURATION: 100 %

## 2022-08-02 DIAGNOSIS — M62.830 LUMBAR PARASPINAL MUSCLE SPASM: ICD-10-CM

## 2022-08-02 DIAGNOSIS — M54.40 LOW BACK PAIN WITH SCIATICA, SCIATICA LATERALITY UNSPECIFIED, UNSPECIFIED BACK PAIN LATERALITY, UNSPECIFIED CHRONICITY: Primary | ICD-10-CM

## 2022-08-02 DIAGNOSIS — S16.1XXD STRAIN OF NECK MUSCLE, SUBSEQUENT ENCOUNTER: ICD-10-CM

## 2022-08-02 DIAGNOSIS — M62.838 CERVICAL PARASPINAL MUSCLE SPASM: ICD-10-CM

## 2022-08-02 PROCEDURE — 3078F DIAST BP <80 MM HG: CPT | Mod: CPTII,S$GLB,, | Performed by: PHYSICIAN ASSISTANT

## 2022-08-02 PROCEDURE — 1159F PR MEDICATION LIST DOCUMENTED IN MEDICAL RECORD: ICD-10-PCS | Mod: CPTII,S$GLB,, | Performed by: PHYSICIAN ASSISTANT

## 2022-08-02 PROCEDURE — 3074F SYST BP LT 130 MM HG: CPT | Mod: CPTII,S$GLB,, | Performed by: PHYSICIAN ASSISTANT

## 2022-08-02 PROCEDURE — 3074F PR MOST RECENT SYSTOLIC BLOOD PRESSURE < 130 MM HG: ICD-10-PCS | Mod: CPTII,S$GLB,, | Performed by: PHYSICIAN ASSISTANT

## 2022-08-02 PROCEDURE — 99999 PR PBB SHADOW E&M-EST. PATIENT-LVL IV: ICD-10-PCS | Mod: PBBFAC,,, | Performed by: PHYSICIAN ASSISTANT

## 2022-08-02 PROCEDURE — 99214 OFFICE O/P EST MOD 30 MIN: CPT | Mod: S$GLB,,, | Performed by: PHYSICIAN ASSISTANT

## 2022-08-02 PROCEDURE — 1159F MED LIST DOCD IN RCRD: CPT | Mod: CPTII,S$GLB,, | Performed by: PHYSICIAN ASSISTANT

## 2022-08-02 PROCEDURE — 99999 PR PBB SHADOW E&M-EST. PATIENT-LVL IV: CPT | Mod: PBBFAC,,, | Performed by: PHYSICIAN ASSISTANT

## 2022-08-02 PROCEDURE — 3008F PR BODY MASS INDEX (BMI) DOCUMENTED: ICD-10-PCS | Mod: CPTII,S$GLB,, | Performed by: PHYSICIAN ASSISTANT

## 2022-08-02 PROCEDURE — 1160F RVW MEDS BY RX/DR IN RCRD: CPT | Mod: CPTII,S$GLB,, | Performed by: PHYSICIAN ASSISTANT

## 2022-08-02 PROCEDURE — 99214 PR OFFICE/OUTPT VISIT, EST, LEVL IV, 30-39 MIN: ICD-10-PCS | Mod: S$GLB,,, | Performed by: PHYSICIAN ASSISTANT

## 2022-08-02 PROCEDURE — 1160F PR REVIEW ALL MEDS BY PRESCRIBER/CLIN PHARMACIST DOCUMENTED: ICD-10-PCS | Mod: CPTII,S$GLB,, | Performed by: PHYSICIAN ASSISTANT

## 2022-08-02 PROCEDURE — 3008F BODY MASS INDEX DOCD: CPT | Mod: CPTII,S$GLB,, | Performed by: PHYSICIAN ASSISTANT

## 2022-08-02 PROCEDURE — 3078F PR MOST RECENT DIASTOLIC BLOOD PRESSURE < 80 MM HG: ICD-10-PCS | Mod: CPTII,S$GLB,, | Performed by: PHYSICIAN ASSISTANT

## 2022-08-02 RX ORDER — CYCLOBENZAPRINE HCL 10 MG
10 TABLET ORAL
COMMUNITY
Start: 2022-07-28 | End: 2022-08-02 | Stop reason: SDUPTHER

## 2022-08-02 RX ORDER — NAPROXEN 500 MG/1
500 TABLET ORAL
COMMUNITY
Start: 2022-07-28 | End: 2022-08-02 | Stop reason: SDUPTHER

## 2022-08-02 RX ORDER — CYCLOBENZAPRINE HCL 10 MG
10 TABLET ORAL 3 TIMES DAILY
Qty: 30 TABLET | Refills: 1 | Status: SHIPPED | OUTPATIENT
Start: 2022-08-02 | End: 2022-10-11 | Stop reason: SDUPTHER
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
## 2022-08-04 ENCOUNTER — PATIENT MESSAGE (OUTPATIENT)
Dept: FAMILY MEDICINE | Facility: CLINIC | Age: 32
End: 2022-08-04
Payer: COMMERCIAL

## 2022-08-09 ENCOUNTER — CLINICAL SUPPORT (OUTPATIENT)
Dept: REHABILITATION | Facility: HOSPITAL | Age: 32
End: 2022-08-09
Payer: COMMERCIAL

## 2022-08-09 DIAGNOSIS — Z55.9 SPECIAL EDUCATIONAL NEEDS: ICD-10-CM

## 2022-08-09 DIAGNOSIS — Z74.09 DECREASED FUNCTIONAL MOBILITY AND ENDURANCE: ICD-10-CM

## 2022-08-09 DIAGNOSIS — M62.89 ABNORMAL INCREASED MUSCLE TONE: ICD-10-CM

## 2022-08-09 DIAGNOSIS — R53.1 WEAKNESS: ICD-10-CM

## 2022-08-09 DIAGNOSIS — M62.838 CERVICAL PARASPINAL MUSCLE SPASM: ICD-10-CM

## 2022-08-09 DIAGNOSIS — M54.41 ACUTE BILATERAL LOW BACK PAIN WITH RIGHT-SIDED SCIATICA: Primary | ICD-10-CM

## 2022-08-09 DIAGNOSIS — M54.40 LOW BACK PAIN WITH SCIATICA, SCIATICA LATERALITY UNSPECIFIED, UNSPECIFIED BACK PAIN LATERALITY, UNSPECIFIED CHRONICITY: ICD-10-CM

## 2022-08-09 PROCEDURE — 97162 PT EVAL MOD COMPLEX 30 MIN: CPT | Mod: PN

## 2022-08-09 PROCEDURE — 97110 THERAPEUTIC EXERCISES: CPT | Mod: PN

## 2022-08-09 SDOH — SOCIAL DETERMINANTS OF HEALTH (SDOH): PROBLEMS RELATED TO EDUCATION AND LITERACY, UNSPECIFIED: Z55.9

## 2022-08-10 PROBLEM — Z55.9 SPECIAL EDUCATIONAL NEEDS: Status: ACTIVE | Noted: 2022-08-10

## 2022-08-10 PROBLEM — Z74.09 DECREASED FUNCTIONAL MOBILITY AND ENDURANCE: Status: ACTIVE | Noted: 2022-08-10

## 2022-08-10 PROBLEM — R53.1 WEAKNESS: Status: ACTIVE | Noted: 2022-08-10

## 2022-08-10 PROBLEM — M62.89 ABNORMAL INCREASED MUSCLE TONE: Status: ACTIVE | Noted: 2022-08-10

## 2022-08-10 PROBLEM — M54.41 ACUTE BILATERAL LOW BACK PAIN WITH RIGHT-SIDED SCIATICA: Status: ACTIVE | Noted: 2022-08-10
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
## 2022-08-15 ENCOUNTER — CLINICAL SUPPORT (OUTPATIENT)
Dept: REHABILITATION | Facility: HOSPITAL | Age: 32
End: 2022-08-15
Payer: COMMERCIAL

## 2022-08-15 DIAGNOSIS — Z74.09 DECREASED FUNCTIONAL MOBILITY AND ENDURANCE: ICD-10-CM

## 2022-08-15 DIAGNOSIS — Z55.9 SPECIAL EDUCATIONAL NEEDS: ICD-10-CM

## 2022-08-15 DIAGNOSIS — R53.1 WEAKNESS: ICD-10-CM

## 2022-08-15 DIAGNOSIS — M54.41 ACUTE BILATERAL LOW BACK PAIN WITH RIGHT-SIDED SCIATICA: Primary | ICD-10-CM

## 2022-08-15 DIAGNOSIS — M62.89 ABNORMAL INCREASED MUSCLE TONE: ICD-10-CM

## 2022-08-15 PROCEDURE — 97110 THERAPEUTIC EXERCISES: CPT | Mod: PN,CQ

## 2022-08-15 SDOH — SOCIAL DETERMINANTS OF HEALTH (SDOH): PROBLEMS RELATED TO EDUCATION AND LITERACY, UNSPECIFIED: Z55.9

## 2022-08-15 NOTE — PROGRESS NOTES
[facility]  Outpatient Physical Therapy Daily Treatment Note      Name: Felipe Nava  Clinic Number: [id number]  Visit Date: 8/15/2022    Therapy Diagnosis:   Encounter Diagnoses   Name Primary?    Acute bilateral low back pain with right-sided sciatica Yes    Weakness     Abnormal increased muscle tone     Decreased functional mobility and endurance     Special educational needs        Physician: Felipe Villasenor, PA*   Physician Orders: PT Eval and Treat   Medical Diagnosis from Referral: M54.40 (ICD-10-CM) - Low back pain with sciatica, sciatica laterality unspecified, unspecified back pain laterality, unspecified chronicity M62.838 (ICD-10-CM) - Cervical paraspinal muscle spasm   Evaluation Date: 8/9/2022  Authorization Period Expiration: 12/31/2022  Plan of Care Expiration: 10/07/2022 at 2x/wk x 8 weeks  Progress Note Due: 09/09/2022  Visit # / Visits authorized: 1/20  FOTO: 1/ 3     Time In: 11:00 am  Time Out: 11:45 am  Total Appointment Time (timed & untimed codes):45 minutes     Precautions: Standard    Subjective     The patient presents to therapy reporting pain level 7/10 over the whole weekend.     Response to previous treatment: first treatment following eval   Functional change: first treatment following eval     Pain: 7/10  Location: bilateral back      Objective     Felipe received therapeutic exercises to develop strength, endurance, ROM, flexibility, posture and core stabilization for 45 minutes including:    Nustep 6 minutes  Hamstring stretch 3 x 30   Piriformis stretch 3 x 30  Standing hip flexion stretch 3 x 30 lunge position    Standing lumbar extension against desk for support 20 x   lumbar trunk rotation supine 20 x   quadratus lumborum stretch 3 x 30 supine       Patient Education and HEP     He was compliant with home exercise program.    Education provided:   - home exercise program review and updated    Written Home Exercises  Provided: Patient instructed to cont prior HEP.  Exercises were reviewed and Felipe was able to demonstrate them prior to the end of the session.  Felipe demonstrated good  understanding of the education provided.     See EMR under Patient Instructions for exercises provided prior visit.    Assessment     The patient presented to clinic with moderate complaints of pain. He was very limited in hip and lumbar mobility most likely due to long hours confined to a desk with his job. Today's treatment focused on flexibility exercises for improved mobility. Pt reported every stretch feeling very intense. He was issued an updated home exercise program to include more of these types of exercises. It is hopeful that as pt's mobility improves so will his tolerance for exercises in clinical setting. He puts forth good effort and is expected to progress towards short and long term therapy goals.     Pt will continue to benefit from skilled outpatient physical therapy to address the deficits listed in the problem list box on initial evaluation, provide pt/family education and to maximize pt's level of independence in the home and community environment.     Felipe Is progressing well towards his goals.   Pt prognosis is Excellent.     Pt's spiritual, cultural and educational needs considered and pt agreeable to plan of care and goals.    Anticipated barriers to physical therapy: co-morbidities.    Goals:    STG  Weeks/Visits Date Established  Date Met   1.Decrease max lumbar and flank pain to 5/10 to allow the patient to get a good night's sleep. 4 weeks. 8/9/2022    In Progress 8/15/2022        2. Decrease bilateral hamstring and piriformis muscle tone to Moderate Increase to improve sitting position for job tasks. 4 weeks. 8/9/2022    In Progress 8/15/2022        3.Increase core strength 1/2 grade to improve standing endurance and lifting ability.  4 weeks. 8/9/2022    In Progress 8/15/2022        4.Decrease FOTO limitation  score to <=45% to improve job task ability.  4 weeks. 8/9/2022    In Progress 8/15/2022        5.Fair initial written home exercise program knowledge and be without questions to have carryover after discharge from PT. 4 weeks. 8/9/2022    In Progress 8/15/2022           LTG Weeks/Visits Date Established  Date Met   1.Decrease max lumbar and flank pain to 2/10 to allow the patient to get a good night's sleep. 8 weeks. 8/9/2022    In Progress 8/15/2022        2. Decrease bilateral hamstring and piriformis muscle tone to Minimal  Increase to improve sitting position for job tasks. 8 weeks. 8/9/2022       In Progress 8/15/2022        3.Increase core strength one grade from evaluation date to improve standing endurance and lifting ability 8 weeks. 8/9/2022    In Progress 8/15/2022        4.Decrease FOTO limitation score to <=40% to improve job task ability.  8 weeks. 8/9/2022    In Progress 8/15/2022        5.Good Progressed written home exercise program knowledge and be without questions to have carryover after discharge from PT. 8 weeks. 8/9/2022    In Progress 8/15/2022           Plan     Continue with the plan of care established per initial evaluation    Dinah Loya, PTA

## 2022-08-16 ENCOUNTER — DOCUMENTATION ONLY (OUTPATIENT)
Dept: REHABILITATION | Facility: HOSPITAL | Age: 32
End: 2022-08-16
Payer: COMMERCIAL

## 2022-08-18 ENCOUNTER — CLINICAL SUPPORT (OUTPATIENT)
Dept: REHABILITATION | Facility: HOSPITAL | Age: 32
End: 2022-08-18
Payer: COMMERCIAL

## 2022-08-18 DIAGNOSIS — M62.89 ABNORMAL INCREASED MUSCLE TONE: ICD-10-CM

## 2022-08-18 DIAGNOSIS — R53.1 WEAKNESS: ICD-10-CM

## 2022-08-18 DIAGNOSIS — M54.41 ACUTE BILATERAL LOW BACK PAIN WITH RIGHT-SIDED SCIATICA: Primary | ICD-10-CM

## 2022-08-18 DIAGNOSIS — Z74.09 DECREASED FUNCTIONAL MOBILITY AND ENDURANCE: ICD-10-CM

## 2022-08-18 DIAGNOSIS — Z55.9 SPECIAL EDUCATIONAL NEEDS: ICD-10-CM

## 2022-08-18 PROCEDURE — 97110 THERAPEUTIC EXERCISES: CPT | Mod: PN,CQ

## 2022-08-18 PROCEDURE — 97140 MANUAL THERAPY 1/> REGIONS: CPT | Mod: PN,CQ

## 2022-08-18 SDOH — SOCIAL DETERMINANTS OF HEALTH (SDOH): PROBLEMS RELATED TO EDUCATION AND LITERACY, UNSPECIFIED: Z55.9

## 2022-08-18 NOTE — PROGRESS NOTES
[facility]  Outpatient Physical Therapy Daily Treatment Note      Name: Felipe Nava  Clinic Number: [id number]  Visit Date: 8/18/2022    Therapy Diagnosis:   Encounter Diagnoses   Name Primary?    Acute bilateral low back pain with right-sided sciatica Yes    Weakness     Abnormal increased muscle tone     Decreased functional mobility and endurance     Special educational needs        Physician: Felipe Villasenor, PA*   Physician Orders: PT Eval and Treat   Medical Diagnosis from Referral: M54.40 (ICD-10-CM) - Low back pain with sciatica, sciatica laterality unspecified, unspecified back pain laterality, unspecified chronicity M62.838 (ICD-10-CM) - Cervical paraspinal muscle spasm   Evaluation Date: 8/9/2022  Authorization Period Expiration: 12/31/2022  Plan of Care Expiration: 10/07/2022 at 2x/wk x 8 weeks  Progress Note Due: 09/09/2022  Visit # / Visits authorized: 2/20  FOTO: 1/ 3     Time In: 3:30 pm  Time Out: 4:45 am  Total Appointment Time (timed & untimed codes):55 minutes + 10 minutes cold pack      Precautions: Standard    Subjective     The patient presents to therapy reporting compliance with home exercise program reporting hip flexor stretch feeling the most difficult for him.     Response to previous treatment: positive   Functional change: reduction of constant pain with activities of daily living     Pain: 5/10  Location: bilateral back      Objective     Felipe received therapeutic exercises to develop strength, endurance, ROM, flexibility, posture and core stabilization for 50 minutes including:    Nustep 6 minutes  Hamstring stretch 3 x 30   Piriformis stretch 3 x 30  1/2 half kneel position on airex pad hip flexion stretch 3 x 30    Standing lumbar extension against desk for support 20 x   lumbar trunk rotation supine 20 x   quadratus lumborum stretch 3 x 30 supine   sidelying clams 10 x  sidelying rev clams 10x   Bridges 2 x 10      Felipe received the following manual therapy techniques: Joint mobilizations were applied to the: right hip  for 5 minutes, including: general mobilizations followed by distraction with external rotation.     Felipe received cold  pack for 10 minutes to lumbar region.    Patient Education and HEP     He was compliant with home exercise program.    Education provided:   - home exercise program review and updated    Written Home Exercises Provided: Patient instructed to cont prior HEP.  Exercises were reviewed and Felipe was able to demonstrate them prior to the end of the session.  Felipe demonstrated good  understanding of the education provided.     See EMR under Patient Instructions for exercises provided prior visit.    Assessment     The patient reported compliance with home exercise program but stated that the hip flexor stretch was the most challenging. He was shown how to perform this exercise in half kneeling position. He will follow up with this modification at home. Pt also reported clicking in right hip with rotational forces particularly when right foot was planted. He reported more tightness and intensity of response on right hip to stretches as compared to left. Hip mobs performed or right hip for greater mobility. Greater ease of motion noted following mobs as well as cessation of clicking in right hip following manual techniques. Additional strengthening for bilateral hips initiated today with pt reporting moderate challenge. Pt will benefit from continued skilled therapy to address strength, range of motion, and functional deficits.       Pt will continue to benefit from skilled outpatient physical therapy to address the deficits listed in the problem list box on initial evaluation, provide pt/family education and to maximize pt's level of independence in the home and community environment.     Felipe Is progressing well towards his goals.   Pt prognosis is Excellent.     Pt's spiritual,  cultural and educational needs considered and pt agreeable to plan of care and goals.    Anticipated barriers to physical therapy: co-morbidities.    Goals:    STG  Weeks/Visits Date Established  Date Met   1.Decrease max lumbar and flank pain to 5/10 to allow the patient to get a good night's sleep. 4 weeks. 8/9/2022    In Progress 8/18/2022        2. Decrease bilateral hamstring and piriformis muscle tone to Moderate Increase to improve sitting position for job tasks. 4 weeks. 8/9/2022    In Progress 8/18/2022        3.Increase core strength 1/2 grade to improve standing endurance and lifting ability.  4 weeks. 8/9/2022    In Progress 8/18/2022        4.Decrease FOTO limitation score to <=45% to improve job task ability.  4 weeks. 8/9/2022    In Progress 8/18/2022        5.Fair initial written home exercise program knowledge and be without questions to have carryover after discharge from PT. 4 weeks. 8/9/2022    In Progress 8/18/2022           LTG Weeks/Visits Date Established  Date Met   1.Decrease max lumbar and flank pain to 2/10 to allow the patient to get a good night's sleep. 8 weeks. 8/9/2022    In Progress 8/18/2022        2. Decrease bilateral hamstring and piriformis muscle tone to Minimal  Increase to improve sitting position for job tasks. 8 weeks. 8/9/2022       In Progress 8/18/2022        3.Increase core strength one grade from evaluation date to improve standing endurance and lifting ability 8 weeks. 8/9/2022    In Progress 8/18/2022        4.Decrease FOTO limitation score to <=40% to improve job task ability.  8 weeks. 8/9/2022    In Progress 8/18/2022        5.Good Progressed written home exercise program knowledge and be without questions to have carryover after discharge from PT. 8 weeks. 8/9/2022    In Progress 8/18/2022           Plan     Continue with the plan of care established per initial evaluation    Dinah Loya, PTA

## 2022-08-23 ENCOUNTER — CLINICAL SUPPORT (OUTPATIENT)
Dept: REHABILITATION | Facility: HOSPITAL | Age: 32
End: 2022-08-23
Payer: COMMERCIAL

## 2022-08-23 DIAGNOSIS — M62.89 ABNORMAL INCREASED MUSCLE TONE: ICD-10-CM

## 2022-08-23 DIAGNOSIS — R53.1 WEAKNESS: ICD-10-CM

## 2022-08-23 DIAGNOSIS — M54.41 ACUTE BILATERAL LOW BACK PAIN WITH RIGHT-SIDED SCIATICA: Primary | ICD-10-CM

## 2022-08-23 DIAGNOSIS — Z55.9 SPECIAL EDUCATIONAL NEEDS: ICD-10-CM

## 2022-08-23 DIAGNOSIS — Z74.09 DECREASED FUNCTIONAL MOBILITY AND ENDURANCE: ICD-10-CM

## 2022-08-23 PROCEDURE — 97110 THERAPEUTIC EXERCISES: CPT | Mod: PN,CQ

## 2022-08-23 SDOH — SOCIAL DETERMINANTS OF HEALTH (SDOH): PROBLEMS RELATED TO EDUCATION AND LITERACY, UNSPECIFIED: Z55.9

## 2022-08-23 NOTE — PROGRESS NOTES
"[facility]  Outpatient Physical Therapy Daily Treatment Note      Name: Felipe Nava  Clinic Number: [id number]  Visit Date: 8/23/2022    Therapy Diagnosis:   Encounter Diagnoses   Name Primary?    Acute bilateral low back pain with right-sided sciatica Yes    Weakness     Abnormal increased muscle tone     Decreased functional mobility and endurance     Special educational needs        Physician: Felipe Villasenor, PA*   Physician Orders: PT Eval and Treat   Medical Diagnosis from Referral: M54.40 (ICD-10-CM) - Low back pain with sciatica, sciatica laterality unspecified, unspecified back pain laterality, unspecified chronicity M62.838 (ICD-10-CM) - Cervical paraspinal muscle spasm   Evaluation Date: 8/9/2022  Authorization Period Expiration: 12/31/2022  Plan of Care Expiration: 10/07/2022 at 2x/wk x 8 weeks  Progress Note Due: 09/09/2022  Visit # / Visits authorized: 4/20  FOTO: 1/ 3     Time In:9:00 am  Time Out: 10:00 am  Total Appointment Time (timed & untimed codes):45 minutes + 10 cold pack      Precautions: Standard    Subjective     The patient reports increased pain today. "I haven't been doing my stretches."      Response to previous treatment: positive   Functional change: reduction of constant pain with activities of daily living     Pain: 7/10  Location: bilateral back      Objective     Felipe received therapeutic exercises to develop strength, endurance, ROM, flexibility, posture and core stabilization for 45 minutes including:    Nustep 6 minutes  Hamstring stretch 3 x 30   Piriformis stretch 3 x 30  1/2 half kneel position on airex pad hip flexion stretch 3 x 30    Standing lumbar extension against desk for support 20 x   lumbar trunk rotation supine 20 x   quadratus lumborum stretch 3 x 30 supine   sidelying clams 10 x 2  sidelying rev clams 10x 2  Bridges 3 x 10     Felipe received the following manual therapy techniques: Joint " mobilizations were applied to the: right hip  for 0 minutes, including: general mobilizations followed by distraction with external rotation.     Felipe received cold  pack for 10 minutes to lumbar region.    Patient Education and HEP     He was compliant with home exercise program.    Education provided:   - home exercise program review and updated    Written Home Exercises Provided: Patient instructed to cont prior HEP.  Exercises were reviewed and Felipe was able to demonstrate them prior to the end of the session.  Felipe demonstrated good  understanding of the education provided.     See EMR under Patient Instructions for exercises provided prior visit.    Assessment     Today's treatment again focused on hip strength and flexibility. Pt was educated on the importance of home exercise program in order to be able to advance therapeutic exercises while in clinic. Pt will benefit from continued skilled therapy to address strength, range of motion, and functional deficits.       Pt will continue to benefit from skilled outpatient physical therapy to address the deficits listed in the problem list box on initial evaluation, provide pt/family education and to maximize pt's level of independence in the home and community environment.     Felipe Is progressing well towards his goals.   Pt prognosis is Excellent.     Pt's spiritual, cultural and educational needs considered and pt agreeable to plan of care and goals.    Anticipated barriers to physical therapy: co-morbidities.    Goals:    STG  Weeks/Visits Date Established  Date Met   1.Decrease max lumbar and flank pain to 5/10 to allow the patient to get a good night's sleep. 4 weeks. 8/9/2022    In Progress 8/23/2022        2. Decrease bilateral hamstring and piriformis muscle tone to Moderate Increase to improve sitting position for job tasks. 4 weeks. 8/9/2022    In Progress 8/23/2022        3.Increase core strength 1/2 grade to improve standing endurance and  lifting ability.  4 weeks. 8/9/2022    In Progress 8/23/2022        4.Decrease FOTO limitation score to <=45% to improve job task ability.  4 weeks. 8/9/2022    In Progress 8/23/2022        5.Fair initial written home exercise program knowledge and be without questions to have carryover after discharge from PT. 4 weeks. 8/9/2022    In Progress 8/23/2022           LTG Weeks/Visits Date Established  Date Met   1.Decrease max lumbar and flank pain to 2/10 to allow the patient to get a good night's sleep. 8 weeks. 8/9/2022    In Progress 8/23/2022        2. Decrease bilateral hamstring and piriformis muscle tone to Minimal  Increase to improve sitting position for job tasks. 8 weeks. 8/9/2022       In Progress 8/23/2022        3.Increase core strength one grade from evaluation date to improve standing endurance and lifting ability 8 weeks. 8/9/2022    In Progress 8/23/2022        4.Decrease FOTO limitation score to <=40% to improve job task ability.  8 weeks. 8/9/2022    In Progress 8/23/2022        5.Good Progressed written home exercise program knowledge and be without questions to have carryover after discharge from PT. 8 weeks. 8/9/2022    In Progress 8/23/2022           Plan     Continue with the plan of care established per initial evaluation    Dinah Loya PTA

## 2022-08-25 ENCOUNTER — CLINICAL SUPPORT (OUTPATIENT)
Dept: REHABILITATION | Facility: HOSPITAL | Age: 32
End: 2022-08-25
Payer: COMMERCIAL

## 2022-08-25 DIAGNOSIS — M54.41 ACUTE BILATERAL LOW BACK PAIN WITH RIGHT-SIDED SCIATICA: Primary | ICD-10-CM

## 2022-08-25 DIAGNOSIS — Z74.09 DECREASED FUNCTIONAL MOBILITY AND ENDURANCE: ICD-10-CM

## 2022-08-25 DIAGNOSIS — Z55.9 SPECIAL EDUCATIONAL NEEDS: ICD-10-CM

## 2022-08-25 DIAGNOSIS — M62.89 ABNORMAL INCREASED MUSCLE TONE: ICD-10-CM

## 2022-08-25 DIAGNOSIS — R53.1 WEAKNESS: ICD-10-CM

## 2022-08-25 PROCEDURE — 97110 THERAPEUTIC EXERCISES: CPT | Mod: PN,CQ

## 2022-08-25 PROCEDURE — 97112 NEUROMUSCULAR REEDUCATION: CPT | Mod: PN,CQ

## 2022-08-25 SDOH — SOCIAL DETERMINANTS OF HEALTH (SDOH): PROBLEMS RELATED TO EDUCATION AND LITERACY, UNSPECIFIED: Z55.9

## 2022-08-25 NOTE — PROGRESS NOTES
[facility]  Outpatient Physical Therapy Daily Treatment Note      Name: Felipe Nava  Clinic Number: [id number]  Visit Date: 8/25/2022    Therapy Diagnosis:   Encounter Diagnoses   Name Primary?    Acute bilateral low back pain with right-sided sciatica Yes    Weakness     Abnormal increased muscle tone     Decreased functional mobility and endurance     Special educational needs        Physician: Felipe Villasenor, PA*   Physician Orders: PT Eval and Treat   Medical Diagnosis from Referral: M54.40 (ICD-10-CM) - Low back pain with sciatica, sciatica laterality unspecified, unspecified back pain laterality, unspecified chronicity M62.838 (ICD-10-CM) - Cervical paraspinal muscle spasm   Evaluation Date: 8/9/2022  Authorization Period Expiration: 12/31/2022  Plan of Care Expiration: 10/07/2022 at 2x/wk x 8 weeks  Progress Note Due: 09/09/2022  Visit # / Visits authorized: 4/20  FOTO: 1/ 3     Time In:9:00 am  Time Out: 10:00 am  Total Appointment Time (timed & untimed codes):45 minutes + 10 cold pack      Precautions: Standard    Subjective     The patient reports doing better today as compared to previous visit. He stated that he has been doing his stretches at home and it makes a big difference.      Response to previous treatment: positive   Functional change: reduction of constant pain with activities of daily living     Pain: 6/10  Location: bilateral back      Objective     Felipe received therapeutic exercises to develop strength, endurance, ROM, flexibility, posture and core stabilization for 35 minutes including:    treadmill 5  Minutes  sidelying clams 10 x 2  sidelying rev clams 10x 2  Bridges 3 x 10   Sidelying hip abduction 2 x 10   Hamstring stretch 3 x 30   Piriformis stretch 3 x 30  1/2 half kneel position on airex pad hip flexion stretch 3 x 30      Not performed today   lumbar trunk rotation supine 20 x   quadratus lumborum stretch 3 x 30  supine     Felipe participated in neuromuscular re-education activities to improve: Kinesthetic, Sense, Proprioception and Posture for 20 minutes. The following activities were included:    Standing lumbar extension against desk for support 20 x  Lumbar extension 30 #   Planks ( attempted from feet then knees but pt unable to maintain form)   Static hold bridge position with calves on physioball ( red ) 20 sec hold 3 x   Isometric abdominal strengthening 5 sec hold 15 x 3 positions   Shuttle 2 black bands 25 x       Felipe received the following manual therapy techniques: Joint mobilizations were applied to the: right hip  for 5  minutes, including: general mobilizations followed by distraction with external rotation.     Felipe received cold  pack for 10 minutes to lumbar region.    Patient Education and HEP     He was compliant with home exercise program.    Education provided:   - home exercise program review and emphasized importance of compliance  -pt was educated on the use of lumbar roll for support when driving long distances or sitting for long periods of time.     Written Home Exercises Provided: Patient instructed to cont prior HEP.  Exercises were reviewed and Felipe was able to demonstrate them prior to the end of the session.  Felipe demonstrated good  understanding of the education provided.     See EMR under Patient Instructions for exercises provided prior visit.    Assessment     Today's treatment focused on progressing core strengthening. He was able to perform all recommended therapeutic exercises without incident. Moderate to severe core weakness remains apparent as pt was unable to achieve stable positioning with attempted planks even in modified position. He put forth good effort with all recommended therapeutic exercises and should be able to improve this core weakness and instability with continued skilled PT.       Pt will continue to benefit from skilled outpatient physical therapy to  address the deficits listed in the problem list box on initial evaluation, provide pt/family education and to maximize pt's level of independence in the home and community environment.     Felipe Is progressing well towards his goals.   Pt prognosis is Excellent.     Pt's spiritual, cultural and educational needs considered and pt agreeable to plan of care and goals.    Anticipated barriers to physical therapy: co-morbidities.    Goals:    STG  Weeks/Visits Date Established  Date Met   1.Decrease max lumbar and flank pain to 5/10 to allow the patient to get a good night's sleep. 4 weeks. 8/9/2022    In Progress 8/25/2022        2. Decrease bilateral hamstring and piriformis muscle tone to Moderate Increase to improve sitting position for job tasks. 4 weeks. 8/9/2022    In Progress 8/25/2022        3.Increase core strength 1/2 grade to improve standing endurance and lifting ability.  4 weeks. 8/9/2022    In Progress 8/25/2022        4.Decrease FOTO limitation score to <=45% to improve job task ability.  4 weeks. 8/9/2022    In Progress 8/25/2022        5.Fair initial written home exercise program knowledge and be without questions to have carryover after discharge from PT. 4 weeks. 8/9/2022    In Progress 8/25/2022           LTG Weeks/Visits Date Established  Date Met   1.Decrease max lumbar and flank pain to 2/10 to allow the patient to get a good night's sleep. 8 weeks. 8/9/2022    In Progress 8/25/2022        2. Decrease bilateral hamstring and piriformis muscle tone to Minimal  Increase to improve sitting position for job tasks. 8 weeks. 8/9/2022       In Progress 8/25/2022        3.Increase core strength one grade from evaluation date to improve standing endurance and lifting ability 8 weeks. 8/9/2022    In Progress 8/25/2022        4.Decrease FOTO limitation score to <=40% to improve job task ability.  8 weeks. 8/9/2022    In Progress 8/25/2022        5.Good Progressed written home exercise program knowledge  and be without questions to have carryover after discharge from PT. 8 weeks. 8/9/2022    In Progress 8/25/2022           Plan     Continue with the plan of care established per initial evaluation    Dinah Loya PTA

## 2022-08-29 ENCOUNTER — CLINICAL SUPPORT (OUTPATIENT)
Dept: REHABILITATION | Facility: HOSPITAL | Age: 32
End: 2022-08-29
Payer: COMMERCIAL

## 2022-08-29 DIAGNOSIS — R53.1 WEAKNESS: ICD-10-CM

## 2022-08-29 DIAGNOSIS — M62.89 ABNORMAL INCREASED MUSCLE TONE: ICD-10-CM

## 2022-08-29 DIAGNOSIS — Z74.09 DECREASED FUNCTIONAL MOBILITY AND ENDURANCE: ICD-10-CM

## 2022-08-29 DIAGNOSIS — Z55.9 SPECIAL EDUCATIONAL NEEDS: ICD-10-CM

## 2022-08-29 DIAGNOSIS — M54.41 ACUTE BILATERAL LOW BACK PAIN WITH RIGHT-SIDED SCIATICA: Primary | ICD-10-CM

## 2022-08-29 PROCEDURE — 97110 THERAPEUTIC EXERCISES: CPT | Mod: PN,CQ

## 2022-08-29 PROCEDURE — 97112 NEUROMUSCULAR REEDUCATION: CPT | Mod: PN,CQ

## 2022-08-29 SDOH — SOCIAL DETERMINANTS OF HEALTH (SDOH): PROBLEMS RELATED TO EDUCATION AND LITERACY, UNSPECIFIED: Z55.9

## 2022-08-29 NOTE — PROGRESS NOTES
[facility]  Outpatient Physical Therapy Daily Treatment Note      Name: Felipe Nava  Clinic Number: [id number]  Visit Date: 8/29/2022    Therapy Diagnosis:   Encounter Diagnoses   Name Primary?    Acute bilateral low back pain with right-sided sciatica Yes    Weakness     Abnormal increased muscle tone     Decreased functional mobility and endurance     Special educational needs        Physician: Felipe Villasenor, PA*   Physician Orders: PT Eval and Treat   Medical Diagnosis from Referral: M54.40 (ICD-10-CM) - Low back pain with sciatica, sciatica laterality unspecified, unspecified back pain laterality, unspecified chronicity M62.838 (ICD-10-CM) - Cervical paraspinal muscle spasm   Evaluation Date: 8/9/2022  Authorization Period Expiration: 12/31/2022  Plan of Care Expiration: 10/07/2022 at 2x/wk x 8 weeks  Progress Note Due: 09/09/2022  Visit # / Visits authorized: 5/20  FOTO: 1/ 3     Time In:13:15 pm  Time Out: 14:15 pm  Total Appointment Time (timed & untimed codes):45 minutes + 10 cold pack      Precautions: Standard    Subjective     The patient reports moderate soreness after last treatment. He did state that using a towel roll for lumbar support definitely helped his back on the long drive he had to take after therapy last week.      Response to previous treatment: positive   Functional change: able to properly use lumbar support     Pain: 6/10  Location: bilateral back      Objective     Felipe received therapeutic exercises to develop strength, endurance, ROM, flexibility, posture and core stabilization for 35 minutes including:    treadmill 5  Minutes  sidelying clams 10 x 2  sidelying rev clams 10x 2  Bridges 3 x 10   Sidelying hip abduction 2 x 10   Hamstring stretch 3 x 30 (can be discharged to home exercise program)   Piriformis stretch 3 x 30 (can be discharged to home exercise program )   1/2 half kneel position on airex pad hip flexion  stretch 3 x 30  ( can be discharged to home exercise program)     Not performed today   lumbar trunk rotation supine 20 x   quadratus lumborum stretch 3 x 30 supine     Felipe participated in neuromuscular re-education activities to improve: Kinesthetic, Sense, Proprioception and Posture for 20 minutes. The following activities were included:    Standing lumbar extension against desk for support 20 x  Lumbar extension 30 #   Planks ( attempted from feet then knees but pt unable to maintain form)   Static hold bridge position with calves on physioball ( red ) 20 sec hold 3 x   Dynamic bridges on ball 2 x 10 ( can add hamstring curls at next treatment)   Isometric abdominal strengthening 5 sec hold 15 x 3 positions   Shuttle 2 black bands 25 x       Felipe received the following manual therapy techniques: Joint mobilizations were applied to the: right hip  for 5  minutes, including: general mobilizations followed by distraction with external rotation.     Felipe received cold  pack for 10 minutes to lumbar region.    Patient Education and HEP     He was compliant with home exercise program.    Education provided:   - home exercise program review and emphasized importance of compliance  -pt was educated on the use of lumbar roll for support when driving long distances or sitting for long periods of time.     Written Home Exercises Provided: Patient instructed to cont prior HEP.  Exercises were reviewed and Felipe was able to demonstrate them prior to the end of the session.  Felipe demonstrated good  understanding of the education provided.     See EMR under Patient Instructions for exercises provided prior visit.    Assessment     Today's treatment focused on progressing core strengthening. He was able to perform all recommended therapeutic exercises without incident. Moderate to severe core weakness as well as global lack of joint flexibility remains limiting factors. The importance of compliance with home exercise  program for flexibility was again emphasized with pt so clinical advancement can be focused on strength and functional abilities. He put forth good effort with all recommended therapeutic exercises and should be able to improve this core weakness and instability with continued skilled PT.       Pt will continue to benefit from skilled outpatient physical therapy to address the deficits listed in the problem list box on initial evaluation, provide pt/family education and to maximize pt's level of independence in the home and community environment.     Felipe Is progressing well towards his goals.   Pt prognosis is Excellent.     Pt's spiritual, cultural and educational needs considered and pt agreeable to plan of care and goals.    Anticipated barriers to physical therapy: co-morbidities.    Goals:    STG  Weeks/Visits Date Established  Date Met   1.Decrease max lumbar and flank pain to 5/10 to allow the patient to get a good night's sleep. 4 weeks. 8/9/2022    In Progress 8/29/2022        2. Decrease bilateral hamstring and piriformis muscle tone to Moderate Increase to improve sitting position for job tasks. 4 weeks. 8/9/2022    In Progress 8/29/2022        3.Increase core strength 1/2 grade to improve standing endurance and lifting ability.  4 weeks. 8/9/2022    In Progress 8/29/2022        4.Decrease FOTO limitation score to <=45% to improve job task ability.  4 weeks. 8/9/2022    In Progress 8/29/2022        5.Fair initial written home exercise program knowledge and be without questions to have carryover after discharge from PT. 4 weeks. 8/9/2022    In Progress 8/29/2022           LTG Weeks/Visits Date Established  Date Met   1.Decrease max lumbar and flank pain to 2/10 to allow the patient to get a good night's sleep. 8 weeks. 8/9/2022    In Progress 8/29/2022        2. Decrease bilateral hamstring and piriformis muscle tone to Minimal  Increase to improve sitting position for job tasks. 8 weeks. 8/9/2022        In Progress 8/29/2022        3.Increase core strength one grade from evaluation date to improve standing endurance and lifting ability 8 weeks. 8/9/2022    In Progress 8/29/2022        4.Decrease FOTO limitation score to <=40% to improve job task ability.  8 weeks. 8/9/2022    In Progress 8/29/2022        5.Good Progressed written home exercise program knowledge and be without questions to have carryover after discharge from PT. 8 weeks. 8/9/2022    In Progress 8/29/2022           Plan     Continue with the plan of care established per initial evaluation    Dinah Loya, PTA

## 2022-09-01 ENCOUNTER — CLINICAL SUPPORT (OUTPATIENT)
Dept: REHABILITATION | Facility: HOSPITAL | Age: 32
End: 2022-09-01
Payer: COMMERCIAL

## 2022-09-01 DIAGNOSIS — Z55.9 SPECIAL EDUCATIONAL NEEDS: ICD-10-CM

## 2022-09-01 DIAGNOSIS — R53.1 WEAKNESS: Primary | ICD-10-CM

## 2022-09-01 DIAGNOSIS — Z74.09 DECREASED FUNCTIONAL MOBILITY AND ENDURANCE: ICD-10-CM

## 2022-09-01 DIAGNOSIS — M62.89 ABNORMAL INCREASED MUSCLE TONE: ICD-10-CM

## 2022-09-01 DIAGNOSIS — M54.41 ACUTE BILATERAL LOW BACK PAIN WITH RIGHT-SIDED SCIATICA: ICD-10-CM

## 2022-09-01 PROCEDURE — 97110 THERAPEUTIC EXERCISES: CPT | Mod: PN

## 2022-09-01 PROCEDURE — 97112 NEUROMUSCULAR REEDUCATION: CPT | Mod: PN

## 2022-09-01 SDOH — SOCIAL DETERMINANTS OF HEALTH (SDOH): PROBLEMS RELATED TO EDUCATION AND LITERACY, UNSPECIFIED: Z55.9

## 2022-09-01 NOTE — PROGRESS NOTES
"[facility]  Outpatient Physical Therapy Daily Treatment Note      Name: Felipe Nava  Clinic Number: [id number]  Visit Date: 9/1/2022    Therapy Diagnosis:   Encounter Diagnoses   Name Primary?    Acute bilateral low back pain with right-sided sciatica     Weakness Yes    Abnormal increased muscle tone     Decreased functional mobility and endurance     Special educational needs        Physician: Felipe Villasenor, PA*   Physician Orders: PT Eval and Treat   Medical Diagnosis from Referral: M54.40 (ICD-10-CM) - Low back pain with sciatica, sciatica laterality unspecified, unspecified back pain laterality, unspecified chronicity M62.838 (ICD-10-CM) - Cervical paraspinal muscle spasm   Evaluation Date: 8/9/2022  Authorization Period Expiration: 12/31/2022  Plan of Care Expiration: 10/07/2022 at 2x/wk x 8 weeks  Progress Note Due: 09/09/2022  Visit # / Visits authorized: 7/20 (7/16 on plan of care)  FOTO: 1/ 3++++++++++++++++++++++++++++++++2nd 09/06/2022     Time In: 1:47 pm  Time Out: 2:53 pm  Total Appointment Time (timed & untimed codes): 40 minutes + 10 cold pack      Precautions: Standard  Subjective     Today, the patient reports: "I'd say that I am a 4/10 in the low back and gluteal area. I am slowly getting better. I can now do the piriformis stretch at home without pulling my ankle." PT acknowledges.       Felipe was compliant with home exercise program.    Response to previous treatment: The patient enters with no new complaints.    Functional change: The patient reports improved flexibility with his home exercise program.      Pain: 4/10 upon entering the clinic today.   Location: bilateral back      Objective     Felipe received therapeutic exercises to develop strength, endurance, ROM, flexibility, posture and core stabilization for 30 minutes including:  -+Elliptical x 7 minutes forward and backwards   -+Precor back extension with 45 pounds x " 25  -+Precor scapular retraction with 25 pounds x 25  -+Precor chest press with 5 pounds x 15  -+Precor outer thigh with 30 pounds x 25  -+Precor trunk rotation with 15 pounds bilaterally x 20 each    Not performed today:  treadmill 5  Minutes  sidelying clams 10 x 2  sidelying rev clams 10x 2  Bridges 3 x 10   Sidelying hip abduction 2 x 10   Hamstring stretch 3 x 30 (can be discharged to home exercise program)   Piriformis stretch 3 x 30 (can be discharged to home exercise program )   1/2 half kneel position on airex pad hip flexion stretch 3 x 30  ( can be discharged to home exercise program)   quadratus lumborum stretch 3 x 30 supine     Felipe participated in neuromuscular re-education activities to improve: Kinesthetic, Sense, Proprioception and Posture for 10 minutes. The following activities were included:  -+standing on ramp gastrocnemius stretch with 30 second hold x 3  -+sit to stand from 3rd black box with yellow weighted ball x 10 and from 4th black box x 5  -+overhead green ball presses into the wall x 20    Below not performed today:  Standing lumbar extension against desk for support 20 x  Planks ( attempted from feet then knees but pt unable to maintain form)   Static hold bridge position with calves on physioball ( red ) 20 sec hold 3 x   Dynamic bridges on ball 2 x 10 ( can add hamstring curls at next treatment)   Isometric abdominal strengthening 5 sec hold 15 x 3 positions   Shuttle 2 black bands 25 x       Felipe received the following manual therapy techniques: Joint mobilizations were applied to the: right hip  for 0 minutes, including: general mobilizations followed by distraction with external rotation.     Felipe received cold  pack for 10 minutes to lumbar region.    Patient Education and HEP     He was compliant with home exercise program.    Education provided:   - home exercise program review and emphasized importance of compliance  -pt was educated on the use of lumbar roll for  support when driving long distances or sitting for long periods of time.     Written Home Exercises Provided: Patient instructed to cont prior HEP.  Exercises were reviewed and Felipe was able to demonstrate them prior to the end of the session.  Felipe demonstrated good  understanding of the education provided.     See EMR under Patient Instructions for exercises provided prior visit.    Assessment     Today's treatment again focused on safely progressing the patient's core strength. He required consistent verbal cues, but he was able to self correct. He performs his repetitions slowly, but with good form. He reported no increase in pain during his session, but after his session he reported feeling stiffness in the low back and a cold pack was used for relief. He reported feeling well when he left. He continues to exhibit moderate to severe core weakness. He puts forth good effort with PT. With continued skilled PT, he should be able to improve this core weakness and instability. He tolerated his session well today.        Pt will continue to benefit from skilled outpatient physical therapy to address the deficits listed in the problem list box on initial evaluation, provide pt/family education and to maximize pt's level of independence in the home and community environment.     Felipe Is progressing well towards his goals.   Pt prognosis is Excellent.     Pt's spiritual, cultural and educational needs considered and pt agreeable to plan of care and goals.    Anticipated barriers to physical therapy: co-morbidities.    Goals:    STG  Weeks/Visits Date Established  Date Met   1.Decrease max lumbar and flank pain to 5/10 to allow the patient to get a good night's sleep. 4 weeks. 8/9/2022    In Progress 9/1/2022        2. Decrease bilateral hamstring and piriformis muscle tone to Moderate Increase to improve sitting position for job tasks. 4 weeks. 8/9/2022    In Progress 9/1/2022        3.Increase core strength 1/2  grade to improve standing endurance and lifting ability.  4 weeks. 8/9/2022    In Progress 9/1/2022        4.Decrease FOTO limitation score to <=45% to improve job task ability.  4 weeks. 8/9/2022    In Progress 9/1/2022        5.Fair initial written home exercise program knowledge and be without questions to have carryover after discharge from PT. 4 weeks. 8/9/2022    In Progress 9/1/2022           LTG Weeks/Visits Date Established  Date Met   1.Decrease max lumbar and flank pain to 2/10 to allow the patient to get a good night's sleep. 8 weeks. 8/9/2022    In Progress 9/1/2022        2. Decrease bilateral hamstring and piriformis muscle tone to Minimal  Increase to improve sitting position for job tasks. 8 weeks. 8/9/2022       In Progress 9/1/2022        3.Increase core strength one grade from evaluation date to improve standing endurance and lifting ability 8 weeks. 8/9/2022    In Progress 9/1/2022        4.Decrease FOTO limitation score to <=40% to improve job task ability.  8 weeks. 8/9/2022    In Progress 9/1/2022        5.Good Progressed written home exercise program knowledge and be without questions to have carryover after discharge from PT. 8 weeks. 8/9/2022    In Progress 9/1/2022         Plan     Plan of care Certification: 8/9/2022 to 10/07/2022.     Outpatient Physical Therapy 2 times weekly for 8 weeks to include the following interventions: Cervical/Lumbar Traction, Electrical Stimulation unattended., Manual Therapy, Moist Heat/ Ice, Neuromuscular Re-ed, Orthotic Management and Training, Patient Education, Self Care, Therapeutic Activities, Therapeutic Exercise, Ultrasound and care by a PTA. Plan to continue to improve his core strength and flexibility to help provide long term pain relief.      Andres Hernandez, PT

## 2022-09-06 ENCOUNTER — CLINICAL SUPPORT (OUTPATIENT)
Dept: REHABILITATION | Facility: HOSPITAL | Age: 32
End: 2022-09-06
Payer: COMMERCIAL

## 2022-09-06 DIAGNOSIS — M54.41 ACUTE BILATERAL LOW BACK PAIN WITH RIGHT-SIDED SCIATICA: Primary | ICD-10-CM

## 2022-09-06 DIAGNOSIS — R53.1 WEAKNESS: ICD-10-CM

## 2022-09-06 DIAGNOSIS — Z74.09 DECREASED FUNCTIONAL MOBILITY AND ENDURANCE: ICD-10-CM

## 2022-09-06 DIAGNOSIS — Z55.9 SPECIAL EDUCATIONAL NEEDS: ICD-10-CM

## 2022-09-06 DIAGNOSIS — M62.89 ABNORMAL INCREASED MUSCLE TONE: ICD-10-CM

## 2022-09-06 PROCEDURE — 97112 NEUROMUSCULAR REEDUCATION: CPT | Mod: PN,CQ

## 2022-09-06 PROCEDURE — 97110 THERAPEUTIC EXERCISES: CPT | Mod: PN,CQ

## 2022-09-06 SDOH — SOCIAL DETERMINANTS OF HEALTH (SDOH): PROBLEMS RELATED TO EDUCATION AND LITERACY, UNSPECIFIED: Z55.9

## 2022-09-06 NOTE — PROGRESS NOTES
[facility]  Outpatient Physical Therapy Daily Treatment Note      Name: Felipe Nava  Clinic Number: [id number]  Visit Date: 9/6/2022    Therapy Diagnosis:   Encounter Diagnoses   Name Primary?    Acute bilateral low back pain with right-sided sciatica Yes    Weakness     Abnormal increased muscle tone     Decreased functional mobility and endurance     Special educational needs        Physician: Felipe Villasenor, PA*   Physician Orders: PT Eval and Treat   Medical Diagnosis from Referral: M54.40 (ICD-10-CM) - Low back pain with sciatica, sciatica laterality unspecified, unspecified back pain laterality, unspecified chronicity M62.838 (ICD-10-CM) - Cervical paraspinal muscle spasm   Evaluation Date: 8/9/2022  Authorization Period Expiration: 12/31/2022  Plan of Care Expiration: 10/07/2022 at 2x/wk x 8 weeks  Progress Note Due: 09/09/2022  Visit # / Visits authorized: 8/20 (8/16 on plan of care)  FOTO: 2/ 3     Time In: 12:00 pm  Time Out: 12:55 pm  Total Appointment Time (timed & untimed codes): 45 minutes + 10 cold pack      Precautions: Standard  Subjective     Today, the patient reports: pain level at 4-5/10 centered in bilateral right> left      Feliep was compliant with home exercise program.    Response to previous treatment: The patient enters with no new complaints.    Functional change: The patient reports improved flexibility with his home exercise program.      Pain: 4/10 upon entering the clinic today.   Location: bilateral back      Objective     Felipe received therapeutic exercises to develop strength, endurance, ROM, flexibility, posture and core stabilization for 30 minutes including:  -+Elliptical x 7 minutes forward and backwards   -+Precor back extension with 45 pounds x 25  -+Precor scapular retraction with 25 pounds x 25  -+Precor chest press with 5 pounds x 15  -+Precor outer thigh with 30 pounds x 25  -+Precor trunk rotation with 15  pounds bilaterally x 20 each    Not performed today:  treadmill 5  Minutes  sidelying clams 10 x 2  sidelying rev clams 10x 2  Bridges 3 x 10   Sidelying hip abduction 2 x 10   Hamstring stretch 3 x 30 (can be discharged to home exercise program)   Piriformis stretch 3 x 30 (can be discharged to home exercise program )   1/2 half kneel position on airex pad hip flexion stretch 3 x 30  ( can be discharged to home exercise program)   quadratus lumborum stretch 3 x 30 supine     Felipe participated in neuromuscular re-education activities to improve: Kinesthetic, Sense, Proprioception and Posture for 10 minutes. The following activities were included:  -+standing on ramp gastrocnemius stretch with 30 second hold x 3  -+sit to stand from 3rd black box with yellow weighted ball x 20   -+overhead green ball presses into the wall x 20    Below not performed today:  Standing lumbar extension against desk for support 20 x  Planks ( attempted from feet then knees but pt unable to maintain form)   Static hold bridge position with calves on physioball ( red ) 20 sec hold 3 x   Dynamic bridges on ball 2 x 10 ( can add hamstring curls at next treatment)   Isometric abdominal strengthening 5 sec hold 15 x 3 positions   Shuttle 2 black bands 25 x       Felipe received the following manual therapy techniques: Joint mobilizations were applied to the: right hip  for 5 minutes, including: general mobilizations followed by distraction with external rotation.     Felipe received cold  pack for 10 minutes to lumbar region.    Patient Education and HEP     He was compliant with home exercise program.    Education provided:   - home exercise program review and emphasized importance of compliance  -pt was educated on the use of lumbar roll for support when driving long distances or sitting for long periods of time.     Written Home Exercises Provided: Patient instructed to cont prior HEP.  Exercises were reviewed and Felipe was able to  demonstrate them prior to the end of the session.  Felipe demonstrated good  understanding of the education provided.     See EMR under Patient Instructions for exercises provided prior visit.    Assessment     Today's treatment again focused on safely progressing the patient's core strength. He continues to demonstrate slowly  but proper form with all recommended therapeutic exercises. He reported no increase in pain during his session, but endurance for physically challenging activities remains low. He continues to exhibit moderate to severe core weakness. Home exercise program will be advanced for more core strengthening on next visit.  With continued skilled PT, he should be able to improve this core weakness and instability. He tolerated his session well today.        Pt will continue to benefit from skilled outpatient physical therapy to address the deficits listed in the problem list box on initial evaluation, provide pt/family education and to maximize pt's level of independence in the home and community environment.     Felipe Is progressing well towards his goals.   Pt prognosis is Excellent.     Pt's spiritual, cultural and educational needs considered and pt agreeable to plan of care and goals.    Anticipated barriers to physical therapy: co-morbidities.    Goals:    STG  Weeks/Visits Date Established  Date Met   1.Decrease max lumbar and flank pain to 5/10 to allow the patient to get a good night's sleep. 4 weeks. 8/9/2022    In Progress 9/6/2022        2. Decrease bilateral hamstring and piriformis muscle tone to Moderate Increase to improve sitting position for job tasks. 4 weeks. 8/9/2022    In Progress 9/6/2022        3.Increase core strength 1/2 grade to improve standing endurance and lifting ability.  4 weeks. 8/9/2022    In Progress 9/6/2022        4.Decrease FOTO limitation score to <=45% to improve job task ability.  4 weeks. 8/9/2022    In Progress 9/6/2022        5.Fair initial written home  exercise program knowledge and be without questions to have carryover after discharge from PT. 4 weeks. 8/9/2022    In Progress 9/6/2022           LTG Weeks/Visits Date Established  Date Met   1.Decrease max lumbar and flank pain to 2/10 to allow the patient to get a good night's sleep. 8 weeks. 8/9/2022    In Progress 9/6/2022        2. Decrease bilateral hamstring and piriformis muscle tone to Minimal  Increase to improve sitting position for job tasks. 8 weeks. 8/9/2022       In Progress 9/6/2022        3.Increase core strength one grade from evaluation date to improve standing endurance and lifting ability 8 weeks. 8/9/2022    In Progress 9/6/2022        4.Decrease FOTO limitation score to <=40% to improve job task ability.  8 weeks. 8/9/2022    In Progress 9/6/2022        5.Good Progressed written home exercise program knowledge and be without questions to have carryover after discharge from PT. 8 weeks. 8/9/2022    In Progress 9/6/2022         Plan     Plan of care Certification: 8/9/2022 to 10/07/2022.     Outpatient Physical Therapy 2 times weekly for 8 weeks to include the following interventions: Cervical/Lumbar Traction, Electrical Stimulation unattended., Manual Therapy, Moist Heat/ Ice, Neuromuscular Re-ed, Orthotic Management and Training, Patient Education, Self Care, Therapeutic Activities, Therapeutic Exercise, Ultrasound and care by a PTA. Plan to continue to improve his core strength and flexibility to help provide long term pain relief.      Dinah Loya, LUPE

## 2022-09-08 ENCOUNTER — CLINICAL SUPPORT (OUTPATIENT)
Dept: REHABILITATION | Facility: HOSPITAL | Age: 32
End: 2022-09-08
Payer: COMMERCIAL

## 2022-09-08 DIAGNOSIS — M54.41 ACUTE BILATERAL LOW BACK PAIN WITH RIGHT-SIDED SCIATICA: Primary | ICD-10-CM

## 2022-09-08 DIAGNOSIS — Z55.9 SPECIAL EDUCATIONAL NEEDS: ICD-10-CM

## 2022-09-08 DIAGNOSIS — M62.89 ABNORMAL INCREASED MUSCLE TONE: ICD-10-CM

## 2022-09-08 DIAGNOSIS — Z74.09 DECREASED FUNCTIONAL MOBILITY AND ENDURANCE: ICD-10-CM

## 2022-09-08 DIAGNOSIS — R53.1 WEAKNESS: ICD-10-CM

## 2022-09-08 PROCEDURE — 97112 NEUROMUSCULAR REEDUCATION: CPT | Mod: PN,CQ

## 2022-09-08 PROCEDURE — 97110 THERAPEUTIC EXERCISES: CPT | Mod: PN,CQ

## 2022-09-08 SDOH — SOCIAL DETERMINANTS OF HEALTH (SDOH): PROBLEMS RELATED TO EDUCATION AND LITERACY, UNSPECIFIED: Z55.9

## 2022-09-08 NOTE — PROGRESS NOTES
[facility]  Outpatient Physical Therapy Daily Treatment Note      Name: Felipe Nava  Clinic Number: [id number]  Visit Date: 9/8/2022    Therapy Diagnosis:   Encounter Diagnoses   Name Primary?    Acute bilateral low back pain with right-sided sciatica Yes    Weakness     Abnormal increased muscle tone     Decreased functional mobility and endurance     Special educational needs        Physician: Felipe Villasenor, PA*   Physician Orders: PT Eval and Treat   Medical Diagnosis from Referral: M54.40 (ICD-10-CM) - Low back pain with sciatica, sciatica laterality unspecified, unspecified back pain laterality, unspecified chronicity M62.838 (ICD-10-CM) - Cervical paraspinal muscle spasm   Evaluation Date: 8/9/2022  Authorization Period Expiration: 12/31/2022  Plan of Care Expiration: 10/07/2022 at 2x/wk x 8 weeks  Progress Note Due: 09/09/2022  Visit # / Visits authorized: 9/20 (9/16 on plan of care)  FOTO: 2/ 3     Time In: 13:15 pm  Time Out: 14:15 pm  Total Appointment Time (timed & untimed codes): 50 minutes + 10 cold pack      Precautions: Standard  Subjective     Today, the patient reports: no significant pain today. He did report some moderate soreness following last therapy visit.     Felipe was compliant with home exercise program.    Response to previous treatment: The patient enters with no new complaints.    Functional change: The patient reports improved flexibility with his home exercise program.      Pain: 4/10 upon entering the clinic today.   Location: bilateral back      Objective     Felipe received therapeutic exercises to develop strength, endurance, ROM, flexibility, posture and core stabilization for 40 minutes including:    -+Elliptical x 7 minutes forward and backwards   -+Precor back extension with 50 pounds x 25  -+Precor scapular retraction with 25 pounds x 25  -+Precor chest press with 5 pounds x 20  -+Precor outer thigh with 40 pounds x  25  -+Precor trunk rotation with 15 pounds bilaterally x 20 each    Reviewed for home exercise program   sidelying clams 10 x 2  sidelying rev clams 10x 2  Bridges 2 x 10   Bridges with marches   Sidelying hip abduction 2 x 10   Planks 20 sec x 3 using tactile cues and mirror to ensure proper performance       Felipe participated in neuromuscular re-education activities to improve: Kinesthetic, Sense, Proprioception and Posture for 10 minutes. The following activities were included:    -+standing on ramp gastrocnemius stretch with 30 second hold x 3  -+sit to stand from 3rd black box with yellow weighted ball x 20 added airex pad today  -+overhead green ball presses into the wall x 20    Below not performed today:  Standing lumbar extension against desk for support 20 x  Planks ( attempted from feet then knees but pt unable to maintain form)   Static hold bridge position with calves on physioball ( red ) 20 sec hold 3 x   Dynamic bridges on ball 2 x 10 ( can add hamstring curls at next treatment)   Isometric abdominal strengthening 5 sec hold 15 x 3 positions   Shuttle 2 black bands 25 x       Felipe received the following manual therapy techniques: Joint mobilizations were applied to the: right hip  for 0 minutes, including: general mobilizations followed by distraction with external rotation.     Felipe received cold  pack for 10 minutes to lumbar region.    Patient Education and HEP     He was compliant with home exercise program.    Education provided:   - home exercise program review and emphasized importance of compliance  -pt was educated on the use of lumbar roll for support when driving long distances or sitting for long periods of time.     Written Home Exercises Provided: Patient instructed to cont prior HEP.  Exercises were reviewed and Felipe was able to demonstrate them prior to the end of the session.  Felipe demonstrated good  understanding of the education provided.     See EMR under Patient  Instructions for exercises provided prior visit.    Assessment     Today's treatment again focused on safely progressing the patient's core strength. Home exercise program was reviewed with appropriate advancements instructed as well. The importance of maintaining core and hip strength in order to prevent return of back pain was emphasized with pt. He puts forth good effort with all recommended therapeutic exercises but reports high exertion with all. He is expected to continue advancing strength and endurance with progression towards short and long term therapy goals.         Pt will continue to benefit from skilled outpatient physical therapy to address the deficits listed in the problem list box on initial evaluation, provide pt/family education and to maximize pt's level of independence in the home and community environment.     Felipe Is progressing well towards his goals.   Pt prognosis is Excellent.     Pt's spiritual, cultural and educational needs considered and pt agreeable to plan of care and goals.    Anticipated barriers to physical therapy: co-morbidities.    Goals:    STG  Weeks/Visits Date Established  Date Met   1.Decrease max lumbar and flank pain to 5/10 to allow the patient to get a good night's sleep. 4 weeks. 8/9/2022    In Progress 9/8/2022        2. Decrease bilateral hamstring and piriformis muscle tone to Moderate Increase to improve sitting position for job tasks. 4 weeks. 8/9/2022    In Progress 9/8/2022        3.Increase core strength 1/2 grade to improve standing endurance and lifting ability.  4 weeks. 8/9/2022    In Progress 9/8/2022        4.Decrease FOTO limitation score to <=45% to improve job task ability.  4 weeks. 8/9/2022    In Progress 9/8/2022        5.Fair initial written home exercise program knowledge and be without questions to have carryover after discharge from PT. 4 weeks. 8/9/2022    In Progress 9/8/2022           LTG Weeks/Visits Date Established  Date Met    1.Decrease max lumbar and flank pain to 2/10 to allow the patient to get a good night's sleep. 8 weeks. 8/9/2022    In Progress 9/8/2022        2. Decrease bilateral hamstring and piriformis muscle tone to Minimal  Increase to improve sitting position for job tasks. 8 weeks. 8/9/2022       In Progress 9/8/2022        3.Increase core strength one grade from evaluation date to improve standing endurance and lifting ability 8 weeks. 8/9/2022    In Progress 9/8/2022        4.Decrease FOTO limitation score to <=40% to improve job task ability.  8 weeks. 8/9/2022    In Progress 9/8/2022        5.Good Progressed written home exercise program knowledge and be without questions to have carryover after discharge from PT. 8 weeks. 8/9/2022    In Progress 9/8/2022         Plan     Plan of care Certification: 8/9/2022 to 10/07/2022.     Outpatient Physical Therapy 2 times weekly for 8 weeks to include the following interventions: Cervical/Lumbar Traction, Electrical Stimulation unattended., Manual Therapy, Moist Heat/ Ice, Neuromuscular Re-ed, Orthotic Management and Training, Patient Education, Self Care, Therapeutic Activities, Therapeutic Exercise, Ultrasound and care by a PTA. Plan to continue to improve his core strength and flexibility to help provide long term pain relief.      Dinah Loya, PTA

## 2022-09-12 ENCOUNTER — CLINICAL SUPPORT (OUTPATIENT)
Dept: REHABILITATION | Facility: HOSPITAL | Age: 32
End: 2022-09-12
Payer: COMMERCIAL

## 2022-09-12 DIAGNOSIS — M54.41 ACUTE BILATERAL LOW BACK PAIN WITH RIGHT-SIDED SCIATICA: ICD-10-CM

## 2022-09-12 DIAGNOSIS — R53.1 WEAKNESS: ICD-10-CM

## 2022-09-12 DIAGNOSIS — Z74.09 DECREASED FUNCTIONAL MOBILITY AND ENDURANCE: Primary | ICD-10-CM

## 2022-09-12 DIAGNOSIS — Z55.9 SPECIAL EDUCATIONAL NEEDS: ICD-10-CM

## 2022-09-12 DIAGNOSIS — M62.89 ABNORMAL INCREASED MUSCLE TONE: ICD-10-CM

## 2022-09-12 PROCEDURE — 97110 THERAPEUTIC EXERCISES: CPT | Mod: PN

## 2022-09-12 PROCEDURE — 97112 NEUROMUSCULAR REEDUCATION: CPT | Mod: PN

## 2022-09-12 SDOH — SOCIAL DETERMINANTS OF HEALTH (SDOH): PROBLEMS RELATED TO EDUCATION AND LITERACY, UNSPECIFIED: Z55.9

## 2022-09-12 NOTE — PROGRESS NOTES
"[facility]  Outpatient Physical Therapy Daily Treatment Note/Re-assessment      Name: Felipe Nava  Clinic Number: [id number]  Visit Date: 9/12/2022    Therapy Diagnosis:   Encounter Diagnoses   Name Primary?    Acute bilateral low back pain with right-sided sciatica     Weakness     Abnormal increased muscle tone     Decreased functional mobility and endurance Yes    Special educational needs        Physician: Felipe Villasenor, PA*   Physician Orders: PT Eval and Treat   Medical Diagnosis from Referral: M54.40 (ICD-10-CM) - Low back pain with sciatica, sciatica laterality unspecified, unspecified back pain laterality, unspecified chronicity M62.838 (ICD-10-CM) - Cervical paraspinal muscle spasm   Evaluation Date: 8/9/2022  Authorization Period Expiration: 12/31/2022  Plan of Care Expiration: 10/07/2022 at 2x/wk x 8 weeks  Progress Note Due: 10/06/2022  Visit # / Visits authorized: 10/20 (10/16 on plan of care)  FOTO: 2/ 3     Time In: 1:49 pm  Time Out: 2:52 pm  Total Appointment Time (timed & untimed codes): 55 minutes      Precautions: Standard  Subjective     Today, the patient reports: "I am feeling better for sure since I have started the PT. I have learned at home on my job that if I sit to long I will get up and do my back extensions like y'all have shown me, and it helps me to feel better. I'd say that I am a 4/10 right now, but the most it has been in about two days was only a 5/10. That's much better then what it was." PT acknowledges.     Felipe was compliant with home exercise program.    Response to previous treatment: The patient enters reporting compliance with patient education.     Functional change: The patient reports a decrease in overall pain with activity.     Pain: 4/10 upon entering the clinic today. Max pain within the past 24-48 hours is 5/10  Location: bilateral back      Objective     Felipe received therapeutic exercises to develop " strength, endurance, ROM, flexibility, posture and core stabilization for 40 minutes including:  -+Nu-step on Level 5 x 8 minutes to improve endurance with activity.   -Precor back extension with 50 pounds x 25  -Precor scapular retraction with 30 pounds x 20  -Precor chest press with 10 pounds x 20  -Precor outer thigh with 40 pounds x 25  -Precor trunk rotation with 15 pounds bilaterally x 20 each  -+Precor leg press with 30 pounds x 20 with seat on 9    Below not performed this day:   -Elliptical x 7 minutes forward and backwards   -sidelying clams 10 x 2  -sidelying rev clams 10x 2  -Bridges 2 x 10   -Bridges with marches   -Planks 20 sec x 3 using tactile cues and mirror to ensure proper performance     Felipe participated in neuromuscular re-education activities to improve: Kinesthetic, Sense, Proprioception and Posture for 15 minutes. The following activities were included:  -+Prone lower extremity extension x 10 each leg  -+side lying abduction bilaterally x 10 each leg  -+Thoracic extension in chair over half bolster x 10  -+standing open book bilaterally x 4 repetitions each side with 3 second hold each (did left rotation twice)(4 reps x 2)  -standing on ramp gastrocnemius stretch with 30 second hold x 3  -sit to stand from 3rd black box with yellow weighted ball x 20 added airex pad today  -overhead green ball presses into the wall x 20    Below not performed today:  Standing lumbar extension against desk for support 20 x  Static hold bridge position with calves on physioball ( red ) 20 sec hold 3 x   Dynamic bridges on ball 2 x 10 ( can add hamstring curls at next treatment)   Isometric abdominal strengthening 5 sec hold 15 x 3 positions   Shuttle 2 black bands 25 x     Felipe received cold  pack for 10 minutes to lumbar region. NP    Flexibility        Muscle Left Right Comment             Hamstrings Moderate Increase >Severe Increase Moderate Increase >Severe Increase     Illipsoas Minimal Increase  Minimal Increase     Pirifromis Moderate Increase >Severe Increase Moderate Increase >Severe Increase                          MMT     L-spine     Comment     Left Right               Flexion 4/5 >4-/5 -    Extension 4-/5 >3+/5 -     Side Bending 4-/5 >3+/5 4-/5 >3+/5               Abdominals 4-/5 >3+/5 -     Lower Abdominals 4-/5 >3+/5 -                                             Limitation/Restriction for FOTO Lumbar Survey     Therapist reviewed FOTO scores for Felipe Nava on 09/06/2022   FOTO documents entered into [employer] - see Media section.     Limitation Score: 48% Limitation 09/06/2022 >52% Limitation at the evaluation.         Patient Education and HEP     He was compliant with home exercise program.    Education provided:   -home exercise program review and emphasized importance of compliance  -pt was educated on the use of lumbar roll for support when driving long distances or sitting for long periods of time.     Written Home Exercises Provided: Patient instructed to cont prior HEP.  Exercises were reviewed and Felipe was able to demonstrate them prior to the end of the session.  Felipe demonstrated good  understanding of the education provided.     See EMR under Patient Instructions for exercises provided prior visit.  Re-Assessment     Today, the patient was re-assessed. As of today, the patient has met 4 of 5 short term goals. He is reporting an overall pain decrease. He has shown objective strength gains, and he reports greater activity endurance. The patient does exhibit improved core strength, but he started off very weak. He needs continued core strengthening to improve his posture and improve his lordosis.This should help with long term pain relief. The patient was with greater difficulty with trunk rotation to the right this day. Continued thoracic mobility should help this.  He continues to put forth good effort with all recommended therapeutic exercises ,but he also continues to report high  exertion with all. He is expected to continue advancing his strength and endurance with his progression towards his short and long term therapy goals.       Pt will continue to benefit from skilled outpatient physical therapy to address the deficits listed in the problem list box on initial evaluation, provide pt/family education and to maximize pt's level of independence in the home and community environment.     Felipe Is progressing well towards his goals.   Pt prognosis is Excellent.     Pt's spiritual, cultural and educational needs considered and pt agreeable to plan of care and goals.    Anticipated barriers to physical therapy: co-morbidities.    Goals:    STG  Weeks/Visits Date Established  Date Met   1.Decrease max lumbar and flank pain to 5/10 to allow the patient to get a good night's sleep. 4 weeks. 8/9/2022    Goal Met 9/12/2022        2. Decrease bilateral hamstring and piriformis muscle tone to Moderate Increase to improve sitting position for job tasks. 4 weeks. 8/9/2022    Goal Met 9/12/2022        3.Increase core strength 1/2 grade to improve standing endurance and lifting ability.  4 weeks. 8/9/2022    Goal Met 9/12/2022          4.Decrease FOTO limitation score to <=45% to improve job task ability.  4 weeks. 8/9/2022    In Progress 9/12/2022  48%        5.Fair initial written home exercise program knowledge and be without questions to have carryover after discharge from PT. 4 weeks. 8/9/2022    Goal Met 9/12/2022           LTG Weeks/Visits Date Established  Date Met   1.Decrease max lumbar and flank pain to 2/10 to allow the patient to get a good night's sleep. 8 weeks. 8/9/2022    In Progress 9/12/2022        2. Decrease bilateral hamstring and piriformis muscle tone to minimal increase to improve sitting position for job tasks. 8 weeks. 8/9/2022       In Progress 9/12/2022        3.Increase core strength one grade from evaluation date to improve standing endurance and lifting ability 8 weeks.  8/9/2022    In Progress 9/12/2022        4.Decrease FOTO limitation score to <=40% to improve job task ability.  8 weeks. 8/9/2022    In Progress 9/12/2022        5.Good Progressed written home exercise program knowledge and be without questions to have carryover after discharge from PT. 8 weeks. 8/9/2022    In Progress 9/12/2022       Plan     Plan of care Certification: 8/9/2022 to 10/07/2022. Outpatient Physical Therapy 2 times weekly for 8 weeks. Plan to continue to improve his core strength and flexibility to help provide long term pain relief.        Andres Hernandez, PT

## 2022-09-14 ENCOUNTER — CLINICAL SUPPORT (OUTPATIENT)
Dept: REHABILITATION | Facility: HOSPITAL | Age: 32
End: 2022-09-14
Payer: COMMERCIAL

## 2022-09-14 ENCOUNTER — DOCUMENTATION ONLY (OUTPATIENT)
Dept: REHABILITATION | Facility: HOSPITAL | Age: 32
End: 2022-09-14
Payer: COMMERCIAL

## 2022-09-14 DIAGNOSIS — R53.1 WEAKNESS: ICD-10-CM

## 2022-09-14 DIAGNOSIS — M62.89 ABNORMAL INCREASED MUSCLE TONE: ICD-10-CM

## 2022-09-14 DIAGNOSIS — Z74.09 DECREASED FUNCTIONAL MOBILITY AND ENDURANCE: ICD-10-CM

## 2022-09-14 DIAGNOSIS — Z55.9 SPECIAL EDUCATIONAL NEEDS: ICD-10-CM

## 2022-09-14 DIAGNOSIS — M54.41 ACUTE BILATERAL LOW BACK PAIN WITH RIGHT-SIDED SCIATICA: Primary | ICD-10-CM

## 2022-09-14 PROCEDURE — 97112 NEUROMUSCULAR REEDUCATION: CPT | Mod: PN,CQ

## 2022-09-14 PROCEDURE — 97110 THERAPEUTIC EXERCISES: CPT | Mod: PN,CQ

## 2022-09-14 SDOH — SOCIAL DETERMINANTS OF HEALTH (SDOH): PROBLEMS RELATED TO EDUCATION AND LITERACY, UNSPECIFIED: Z55.9

## 2022-09-14 NOTE — PROGRESS NOTES
PT/PTA face to face conference completed regarding patient treatment plan and progress towards established goals. Treatment will be continued as described in initial report/ evaluation and treatment/progress notes. Patient will be seen by physical therapist every sixth visit or minimally once per month.

## 2022-09-14 NOTE — PROGRESS NOTES
[facility]  Outpatient Physical Therapy Daily Treatment Note      Name: Felipe Nava  Clinic Number: [id number]  Visit Date: 9/14/2022    Therapy Diagnosis:   Encounter Diagnoses   Name Primary?    Acute bilateral low back pain with right-sided sciatica Yes    Weakness     Abnormal increased muscle tone     Decreased functional mobility and endurance     Special educational needs        Physician: Felipe Villasenor, PA*   Physician Orders: PT Eval and Treat   Medical Diagnosis from Referral: M54.40 (ICD-10-CM) - Low back pain with sciatica, sciatica laterality unspecified, unspecified back pain laterality, unspecified chronicity M62.838 (ICD-10-CM) - Cervical paraspinal muscle spasm   Evaluation Date: 8/9/2022  Authorization Period Expiration: 12/31/2022  Plan of Care Expiration: 10/07/2022 at 2x/wk x 8 weeks  Progress Note Due: 10/06/2022  Visit # / Visits authorized: 11/20 (11/16 on plan of care)  FOTO: 2/ 3     Time In: 4:00 pm  Time Out: 4:55 pm  Total Appointment Time (timed & untimed codes): 55 minutes      Precautions: Standard  Subjective     Today, the patient reports: overall improvement but is currently having pain in mid back.   Felipe was compliant with home exercise program.    Response to previous treatment: The patient enters reporting compliance with patient education.     Functional change: The patient reports a decrease in overall pain with activity.     Pain: 4/10 upon entering the clinic today. Max pain within the past 24-48 hours is 5/10  Location: bilateral back      Objective     Felipe received therapeutic exercises to develop strength, endurance, ROM, flexibility, posture and core stabilization for 40 minutes including:    -Elliptical x 7 minutes forward and backwards   -Precor back extension with 55 pounds x 25  -Precor scapular retraction with 30 pounds x 20  -Precor chest press with 10 pounds x 20  -Precor outer thigh with 40 pounds  x 25  -Precor trunk rotation with 15 pounds bilaterally x 20 each  -+Precor leg press with 30 pounds x 20 with seat on 9  _+ Donkey kicks on shuttle 1 red band    Below not performed this day:   -+Nu-step on Level 5 x 8 minutes to improve endurance with activity.   -sidelying clams 10 x 2  -sidelying rev clams 10x 2  -Bridges 2 x 10   -Bridges with marches   -Planks 20 sec x 3 using tactile cues and mirror to ensure proper performance     Felipe participated in neuromuscular re-education activities to improve: Kinesthetic, Sense, Proprioception and Posture for 15 minutes. The following activities were included:  -+Prone lower extremity extension x 10 each leg  -+side lying abduction bilaterally x 10 each leg  -+Thoracic extension in chair over half bolster x 10  -+sidelying open book bilaterally x 10 repetitions each side with 3 second hold each with hips at 90 degrees to lock out lumbar spine.   -standing on ramp gastrocnemius stretch with 30 second hold x 3  -sit to stand from 3rd black box with blue weighted ball x 20 added airex pad today  -overhead green ball presses into the wall x 20    Below not performed today:  Standing lumbar extension against desk for support 20 x  Static hold bridge position with calves on physioball ( red ) 20 sec hold 3 x   Dynamic bridges on ball 2 x 10 ( can add hamstring curls at next treatment)   Isometric abdominal strengthening 5 sec hold 15 x 3 positions   Shuttle 2 black bands 25 x     Felipe received cold  pack for 10 minutes to lumbar region. NP    Patient Education and HEP     He was compliant with home exercise program.    Education provided:   -home exercise program review and emphasized importance of compliance  -pt was educated on the use of lumbar roll for support when driving long distances or sitting for long periods of time.     Written Home Exercises Provided: Patient instructed to cont prior HEP.  Exercises were reviewed and Felipe was able to demonstrate them  prior to the end of the session.  Felipe demonstrated good  understanding of the education provided.     See EMR under Patient Instructions for exercises provided prior visit.  Assessment      Today, the patient presented with complained of mid back pain however after further investigation the pain reported seems to be musculature in nature and is most likely due to delayed onset muscle soreness from the work being performed in therapy. He is consistently increasing his workloads with therapeutic exercises and should continue to experience pain reduction as he gains strength and functional mobility.   Pt will continue to benefit from skilled outpatient physical therapy to address the deficits listed in the problem list box on initial evaluation, provide pt/family education and to maximize pt's level of independence in the home and community environment.     Felipe Is progressing well towards his goals.   Pt prognosis is Excellent.     Pt's spiritual, cultural and educational needs considered and pt agreeable to plan of care and goals.    Anticipated barriers to physical therapy: co-morbidities.    Goals:    STG  Weeks/Visits Date Established  Date Met   1.Decrease max lumbar and flank pain to 5/10 to allow the patient to get a good night's sleep. 4 weeks. 8/9/2022    Goal Met 9/14/2022        2. Decrease bilateral hamstring and piriformis muscle tone to Moderate Increase to improve sitting position for job tasks. 4 weeks. 8/9/2022    Goal Met 9/14/2022        3.Increase core strength 1/2 grade to improve standing endurance and lifting ability.  4 weeks. 8/9/2022    Goal Met 9/14/2022          4.Decrease FOTO limitation score to <=45% to improve job task ability.  4 weeks. 8/9/2022    In Progress 9/14/2022  48%        5.Fair initial written home exercise program knowledge and be without questions to have carryover after discharge from PT. 4 weeks. 8/9/2022    Goal Met 9/14/2022           LTG Weeks/Visits Date  Established  Date Met   1.Decrease max lumbar and flank pain to 2/10 to allow the patient to get a good night's sleep. 8 weeks. 8/9/2022    In Progress 9/14/2022        2. Decrease bilateral hamstring and piriformis muscle tone to minimal increase to improve sitting position for job tasks. 8 weeks. 8/9/2022       In Progress 9/14/2022        3.Increase core strength one grade from evaluation date to improve standing endurance and lifting ability 8 weeks. 8/9/2022    In Progress 9/14/2022        4.Decrease FOTO limitation score to <=40% to improve job task ability.  8 weeks. 8/9/2022    In Progress 9/14/2022        5.Good Progressed written home exercise program knowledge and be without questions to have carryover after discharge from PT. 8 weeks. 8/9/2022    In Progress 9/14/2022       Plan     Plan of care Certification: 8/9/2022 to 10/07/2022. Outpatient Physical Therapy 2 times weekly for 8 weeks. Plan to continue to improve his core strength and flexibility to help provide long term pain relief.        Dinah Loya, PTA

## 2022-09-19 ENCOUNTER — CLINICAL SUPPORT (OUTPATIENT)
Dept: REHABILITATION | Facility: HOSPITAL | Age: 32
End: 2022-09-19
Payer: COMMERCIAL

## 2022-09-19 DIAGNOSIS — R53.1 WEAKNESS: ICD-10-CM

## 2022-09-19 DIAGNOSIS — M62.89 ABNORMAL INCREASED MUSCLE TONE: ICD-10-CM

## 2022-09-19 DIAGNOSIS — Z74.09 DECREASED FUNCTIONAL MOBILITY AND ENDURANCE: Primary | ICD-10-CM

## 2022-09-19 DIAGNOSIS — M54.41 ACUTE BILATERAL LOW BACK PAIN WITH RIGHT-SIDED SCIATICA: ICD-10-CM

## 2022-09-19 DIAGNOSIS — Z55.9 SPECIAL EDUCATIONAL NEEDS: ICD-10-CM

## 2022-09-19 PROCEDURE — 97112 NEUROMUSCULAR REEDUCATION: CPT | Mod: PN

## 2022-09-19 PROCEDURE — 97110 THERAPEUTIC EXERCISES: CPT | Mod: PN

## 2022-09-19 SDOH — SOCIAL DETERMINANTS OF HEALTH (SDOH): PROBLEMS RELATED TO EDUCATION AND LITERACY, UNSPECIFIED: Z55.9

## 2022-09-19 NOTE — PROGRESS NOTES
"[facility]  Outpatient Physical Therapy Daily Treatment Note      Name: Felipe Nava  Clinic Number: [id number]  Visit Date: 9/19/2022    Therapy Diagnosis:   Encounter Diagnoses   Name Primary?    Acute bilateral low back pain with right-sided sciatica     Weakness     Abnormal increased muscle tone     Decreased functional mobility and endurance Yes    Special educational needs        Physician: Felipe Villasenor, PA*   Physician Orders: PT Eval and Treat   Medical Diagnosis from Referral: M54.40 (ICD-10-CM) - Low back pain with sciatica, sciatica laterality unspecified, unspecified back pain laterality, unspecified chronicity M62.838 (ICD-10-CM) - Cervical paraspinal muscle spasm   Evaluation Date: 8/9/2022  Authorization Period Expiration: 12/31/2022  Plan of Care Expiration: 10/07/2022 at 2x/wk x 8 weeks  Progress Note Due: 10/06/2022  Visit # / Visits authorized: 12/20 (12/16 on plan of care)  FOTO: 2/ 3     Time In: 3:15 pm   Time Out: 4:05 pm  Total Appointment Time (timed & untimed codes): 40 minutes      Precautions: Standard  Subjective     Today, the patient reports: "Lower thoracic and upper lumbar area is painful today. I'd say it is currently a 4/10 but it was 5/10 before I started exercising. I am ready to start working on my neck and the upper back areas. I feel like they need strength also." PT acknowledges. Patient was asked his pain level right after performing the elliptical machine.    Felipe was compliant with home exercise program.    Response to previous treatment: The patient enters with no new complaints.     Functional change: No changes to report since the last PT session.      Pain: 5/10 upon entering the clinic today.  Location: bilateral back      Objective     Felipe received therapeutic exercises to develop strength, endurance, ROM, flexibility, posture and core stabilization for 15 minutes including:  -Elliptical x 4 minutes " forward and backwards on Level 3  -Precor back extension with 55 pounds x 25  -Precor scapular retraction with 30 pounds x 20  -Precor chest press with 10 pounds x 20  -Precor leg press with 30 pounds x 20 with seat on 9  -Donkey kicks on shuttle 1 red band bilaterally x 10 each    Below not performed this day:   -Precor outer thigh with 40 pounds x 25  -Precor trunk rotation with 15 pounds bilaterally x 20 each  -Nu-step on Level 5 x 8 minutes to improve endurance with activity.   -sidelying clams 10 x 2  -sidelying rev clams 10x 2  -Bridges 2 x 10   -Bridges with froylan Wang participated in neuromuscular re-education activities to improve: Kinesthetic, Sense, Proprioception and Posture for 25 minutes. The following activities were included:  -+rhomboid/yawn stretch with 30 second hold x 3  -+standing snow angels with red band x 20  -+standing cervical extension and bilateral side bending into red ball x 15 each  -Prone lower extremity extension x 10 each leg  -+Prone lower extremity extension x 12 each leg with knee bent to 90*  -+supine on half bolster from cervical to sacral with arms abducted x 5 minutes and bolster under knees  -Thoracic extension in chair over half bolster x 10    Below not performed today:  -side lying abduction bilaterally x 10 each leg  -sidelying open book bilaterally x 10 repetitions each side with 3 second hold each with hips at 90 degrees to lock out lumbar spine.   -standing on ramp gastrocnemius stretch with 30 second hold x 3  -sit to stand from 3rd black box with blue weighted ball x 20 added airex pad today  -overhead green ball presses into the wall x 20  -Static hold bridge position with calves on physioball ( red ) 20 sec hold 3 x   -Dynamic bridges on ball 2 x 10 ( can add hamstring curls at next treatment)   -Isometric abdominal strengthening 5 sec hold 15 x 3 positions   -Shuttle 2 black bands 25 x     Felipe received cold  pack for 10 minutes to lumbar region.  NP    Patient Education and HEP     He was compliant with home exercise program.    Education provided:   -home exercise program review and emphasized importance of compliance  -pt was educated on the use of lumbar roll for support when driving long distances or sitting for long periods of time.   -+09/19/2022 The patient received an updated written home exercise program. He returned demo to PT and was without questions.     Written Home Exercises Provided: yes.  Exercises were reviewed and Felipe was able to demonstrate them prior to the end of the session.  Felipe demonstrated good  understanding of the education provided.     See EMR under Patient Instructions for exercises provided  09/19/2022 .  Assessment      Today, the patient entered verbalizing thoracolumbar pain. He believes his pain could be from a combination of the advancement of his program and also the type of chair he sits in during his job at home. Per subjective, cervical and upper thoracic exercises were added this day. Significant weakness noted as was in his lumbar spine. He had a much better contraction of his gluteals this day as he advanced through his prone exercises. Supine spinal mobilization over a half bolster brought his significant pain reduction. He was compliant with PT today. His home program was advanced. He tolerated today's PT session well.     Felipe Is progressing well towards his goals.   Pt prognosis is Excellent.      Pt will continue to benefit from skilled outpatient physical therapy to address the deficits listed in the problem list box on initial evaluation, provide pt/family education and to maximize pt's level of independence in the home and community environment.     Pt's spiritual, cultural and educational needs considered and pt agreeable to plan of care and goals.    Anticipated barriers to physical therapy: co-morbidities.    Goals:    STG  Weeks/Visits Date Established  Date Met   1.Decrease max lumbar and flank  pain to 5/10 to allow the patient to get a good night's sleep. 4 weeks. 8/9/2022    Goal Met 9/19/2022        2. Decrease bilateral hamstring and piriformis muscle tone to Moderate Increase to improve sitting position for job tasks. 4 weeks. 8/9/2022    Goal Met 9/19/2022        3.Increase core strength 1/2 grade to improve standing endurance and lifting ability.  4 weeks. 8/9/2022    Goal Met 9/19/2022          4.Decrease FOTO limitation score to <=45% to improve job task ability.  4 weeks. 8/9/2022    In Progress 9/19/2022  48%        5.Fair initial written home exercise program knowledge and be without questions to have carryover after discharge from PT. 4 weeks. 8/9/2022    Goal Met 9/19/2022           LTG Weeks/Visits Date Established  Date Met   1.Decrease max lumbar and flank pain to 2/10 to allow the patient to get a good night's sleep. 8 weeks. 8/9/2022    In Progress 9/19/2022        2. Decrease bilateral hamstring and piriformis muscle tone to minimal increase to improve sitting position for job tasks. 8 weeks. 8/9/2022       In Progress 9/19/2022        3.Increase core strength one grade from evaluation date to improve standing endurance and lifting ability 8 weeks. 8/9/2022    In Progress 9/19/2022        4.Decrease FOTO limitation score to <=40% to improve job task ability.  8 weeks. 8/9/2022    In Progress 9/19/2022        5.Good Progressed written home exercise program knowledge and be without questions to have carryover after discharge from PT. 8 weeks. 8/9/2022    In Progress 9/19/2022       Plan     Plan of care Certification: 8/9/2022 to 10/07/2022. Outpatient Physical Therapy 2 times weekly for 8 weeks. Plan to continue to improve his core strength and flexibility to help provide long term pain relief.        Andres Hernandez, PT

## 2022-09-22 ENCOUNTER — CLINICAL SUPPORT (OUTPATIENT)
Dept: REHABILITATION | Facility: HOSPITAL | Age: 32
End: 2022-09-22
Payer: COMMERCIAL

## 2022-09-22 DIAGNOSIS — M62.89 ABNORMAL INCREASED MUSCLE TONE: ICD-10-CM

## 2022-09-22 DIAGNOSIS — Z55.9 SPECIAL EDUCATIONAL NEEDS: ICD-10-CM

## 2022-09-22 DIAGNOSIS — Z74.09 DECREASED FUNCTIONAL MOBILITY AND ENDURANCE: ICD-10-CM

## 2022-09-22 DIAGNOSIS — M54.41 ACUTE BILATERAL LOW BACK PAIN WITH RIGHT-SIDED SCIATICA: Primary | ICD-10-CM

## 2022-09-22 DIAGNOSIS — R53.1 WEAKNESS: ICD-10-CM

## 2022-09-22 PROCEDURE — 97110 THERAPEUTIC EXERCISES: CPT | Mod: PN,CQ

## 2022-09-22 PROCEDURE — 97112 NEUROMUSCULAR REEDUCATION: CPT | Mod: PN,CQ

## 2022-09-22 SDOH — SOCIAL DETERMINANTS OF HEALTH (SDOH): PROBLEMS RELATED TO EDUCATION AND LITERACY, UNSPECIFIED: Z55.9

## 2022-09-22 NOTE — PROGRESS NOTES
[facility]  Outpatient Physical Therapy Daily Treatment Note      Name: Felipe Nava  Clinic Number: [id number]  Visit Date: 9/22/2022    Therapy Diagnosis:   Encounter Diagnoses   Name Primary?    Acute bilateral low back pain with right-sided sciatica Yes    Weakness     Abnormal increased muscle tone     Decreased functional mobility and endurance     Special educational needs        Physician: Felipe Villasenor, PA*   Physician Orders: PT Eval and Treat   Medical Diagnosis from Referral: M54.40 (ICD-10-CM) - Low back pain with sciatica, sciatica laterality unspecified, unspecified back pain laterality, unspecified chronicity M62.838 (ICD-10-CM) - Cervical paraspinal muscle spasm   Evaluation Date: 8/9/2022  Authorization Period Expiration: 12/31/2022  Plan of Care Expiration: 10/07/2022 at 2x/wk x 8 weeks  Progress Note Due: 10/06/2022  Visit # / Visits authorized: 13/20 (13/16 on plan of care)  FOTO: 2/ 3     Time In: 4:15 pm   Time Out: 5:00 pm  Total Appointment Time (timed & untimed codes): 45 minutes      Precautions: Standard  Subjective     Today, the patient reports: experiencing a sudden sharp painful pop in his lumbar spine during performance of home exercise program. He stated that the pop was scary and painful but the pain eased immediately and left him feeling more mobility. He has had less pain in his back ever since.     Felipe was compliant with home exercise program.    Response to previous treatment: The patient enters with no new complaints.     Functional change: No changes to report since the last PT session.      Pain: 5/10 upon entering the clinic today.  Location: bilateral back      Objective     Felipe received therapeutic exercises to develop strength, endurance, ROM, flexibility, posture and core stabilization for 15 minutes including:  -Elliptical x 4 minutes forward and backwards on Level 3  -Precor back extension with 55 pounds  x 25  -Precor scapular retraction with 35 pounds x 20  -Precor triceps 40 pounds 20 x   -Precor chest press with 15 pounds x 20  -Precor leg press with 40 pounds x 20 with seat on 9  -Donkey kicks on shuttle 1 red band bilaterally x 10 each    Below not performed this day:   -Precor outer thigh with 40 pounds x 25  -Precor trunk rotation with 15 pounds bilaterally x 20 each  -Nu-step on Level 5 x 8 minutes to improve endurance with activity.   -sidelying clams 10 x 2  -sidelying rev clams 10x 2  -Bridges 2 x 10   -Bridges with froylan Wang participated in neuromuscular re-education activities to improve: Kinesthetic, Sense, Proprioception and Posture for 25 minutes. The following activities were included:  -+rhomboid/yawn stretch with 30 second hold x 3  -+standing snow angels with red band x 20 Not performed today   -+standing cervical extension and bilateral side bending into red ball x 15 each  -Prone lower extremity extension x 10 each leg  -+Prone lower extremity extension x 12 each leg with knee bent to 90*  -+supine on full bolster from cervical to sacral with arms abducted x 5 minutes   -Thoracic extension in chair over half bolster x 10  - side crunches 2 x 10 each side   -supermans 3 sec hold 15 x     Below not performed today:  -side lying abduction bilaterally x 10 each leg  -sidelying open book bilaterally x 10 repetitions each side with 3 second hold each with hips at 90 degrees to lock out lumbar spine.   -standing on ramp gastrocnemius stretch with 30 second hold x 3  -sit to stand from 3rd black box with blue weighted ball x 20 added airex pad today  -overhead green ball presses into the wall x 20  -Static hold bridge position with calves on physioball ( red ) 20 sec hold 3 x   -Dynamic bridges on ball 2 x 10 ( can add hamstring curls at next treatment)   -Isometric abdominal strengthening 5 sec hold 15 x 3 positions   -Shuttle 2 black bands 25 x     Felipe received cold  pack for 10 minutes  to lumbar region. NP    Patient Education and HEP     He was compliant with home exercise program.    Education provided:   -home exercise program review and emphasized importance of compliance  -pt was educated on the use of lumbar roll for support when driving long distances or sitting for long periods of time.   -+09/19/2022 The patient received an updated written home exercise program. He returned demo to PT and was without questions.     Written Home Exercises Provided: yes.  Exercises were reviewed and Felipe was able to demonstrate them prior to the end of the session.  Felipe demonstrated good  understanding of the education provided.     See EMR under Patient Instructions for exercises provided  09/19/2022 .  Assessment      Today, the patient was able to advance core strengthening with both side crunches and supermans. He performed them well. He is making good progress with overall strength and endurance and expressed interest in transitioning into a gym setting following discharge from therapy.     Felipe Is progressing well towards his goals.   Pt prognosis is Excellent.      Pt will continue to benefit from skilled outpatient physical therapy to address the deficits listed in the problem list box on initial evaluation, provide pt/family education and to maximize pt's level of independence in the home and community environment.     Pt's spiritual, cultural and educational needs considered and pt agreeable to plan of care and goals.    Anticipated barriers to physical therapy: co-morbidities.    Goals:    STG  Weeks/Visits Date Established  Date Met   1.Decrease max lumbar and flank pain to 5/10 to allow the patient to get a good night's sleep. 4 weeks. 8/9/2022    Goal Met 9/22/2022        2. Decrease bilateral hamstring and piriformis muscle tone to Moderate Increase to improve sitting position for job tasks. 4 weeks. 8/9/2022    Goal Met 9/22/2022        3.Increase core strength 1/2 grade to improve  standing endurance and lifting ability.  4 weeks. 8/9/2022    Goal Met 9/22/2022          4.Decrease FOTO limitation score to <=45% to improve job task ability.  4 weeks. 8/9/2022    In Progress 9/22/2022  48%        5.Fair initial written home exercise program knowledge and be without questions to have carryover after discharge from PT. 4 weeks. 8/9/2022    Goal Met 9/22/2022           LTG Weeks/Visits Date Established  Date Met   1.Decrease max lumbar and flank pain to 2/10 to allow the patient to get a good night's sleep. 8 weeks. 8/9/2022    In Progress 9/22/2022        2. Decrease bilateral hamstring and piriformis muscle tone to minimal increase to improve sitting position for job tasks. 8 weeks. 8/9/2022       In Progress 9/22/2022        3.Increase core strength one grade from evaluation date to improve standing endurance and lifting ability 8 weeks. 8/9/2022    In Progress 9/22/2022        4.Decrease FOTO limitation score to <=40% to improve job task ability.  8 weeks. 8/9/2022    In Progress 9/22/2022        5.Good Progressed written home exercise program knowledge and be without questions to have carryover after discharge from PT. 8 weeks. 8/9/2022    In Progress 9/22/2022       Plan     Plan of care Certification: 8/9/2022 to 10/07/2022. Outpatient Physical Therapy 2 times weekly for 8 weeks. Plan to continue to improve his core strength and flexibility to help provide long term pain relief.        Dinah Loya, PTA

## 2022-09-26 ENCOUNTER — CLINICAL SUPPORT (OUTPATIENT)
Dept: REHABILITATION | Facility: HOSPITAL | Age: 32
End: 2022-09-26
Payer: COMMERCIAL

## 2022-09-26 DIAGNOSIS — R53.1 WEAKNESS: ICD-10-CM

## 2022-09-26 DIAGNOSIS — M54.41 ACUTE BILATERAL LOW BACK PAIN WITH RIGHT-SIDED SCIATICA: Primary | ICD-10-CM

## 2022-09-26 DIAGNOSIS — Z74.09 DECREASED FUNCTIONAL MOBILITY AND ENDURANCE: ICD-10-CM

## 2022-09-26 DIAGNOSIS — Z55.9 SPECIAL EDUCATIONAL NEEDS: ICD-10-CM

## 2022-09-26 DIAGNOSIS — M62.89 ABNORMAL INCREASED MUSCLE TONE: ICD-10-CM

## 2022-09-26 PROCEDURE — 97110 THERAPEUTIC EXERCISES: CPT | Mod: PN,CQ

## 2022-09-26 PROCEDURE — 97112 NEUROMUSCULAR REEDUCATION: CPT | Mod: PN,CQ

## 2022-09-26 SDOH — SOCIAL DETERMINANTS OF HEALTH (SDOH): PROBLEMS RELATED TO EDUCATION AND LITERACY, UNSPECIFIED: Z55.9

## 2022-09-26 NOTE — PROGRESS NOTES
[facility]  Outpatient Physical Therapy Daily Treatment Note      Name: Felipe Nava  Clinic Number: [id number]  Visit Date: 9/26/2022    Therapy Diagnosis:   Encounter Diagnoses   Name Primary?    Acute bilateral low back pain with right-sided sciatica Yes    Weakness     Abnormal increased muscle tone     Decreased functional mobility and endurance     Special educational needs        Physician: Felipe Villasenor, PA*   Physician Orders: PT Eval and Treat   Medical Diagnosis from Referral: M54.40 (ICD-10-CM) - Low back pain with sciatica, sciatica laterality unspecified, unspecified back pain laterality, unspecified chronicity M62.838 (ICD-10-CM) - Cervical paraspinal muscle spasm   Evaluation Date: 8/9/2022  Authorization Period Expiration: 12/31/2022  Plan of Care Expiration: 10/07/2022 at 2x/wk x 8 weeks  Progress Note Due: 10/06/2022  Visit # / Visits authorized: 13/20 (13/16 on plan of care)  FOTO: 2/ 3     Time In: 4:15 pm   Time Out: 5:10 pm  Total Appointment Time (timed & untimed codes): 55 minutes      Precautions: Standard  Subjective     Today, the patient reports: being able to walk around [place] for 2 hours this weekend. He stated that he had to take several rest breaks and that he was sore for approximately 30 minutes following this. No low back pain reported at start of treatment but did report some pain and tightness in bilateral upper traps.     Felipe was compliant with home exercise program.    Response to previous treatment: The patient enters with no new complaints.     Functional change: Able to walk for 2+ hours with manageable soreness afterwards     Pain: 1/10 upon entering the clinic today.  Location: bilateral back      Objective     Felipe received therapeutic exercises to develop strength, endurance, ROM, flexibility, posture and core stabilization for 25  minutes including:    -Elliptical x 8 minutes forward and backwards on  Level 3  -Precor back extension with 55 pounds x 25  -Precor scapular retraction with 35 pounds x 20  -Precor triceps 40 pounds 20 x   -Precor chest press with 15 pounds x 20  -Precor leg press with 40 pounds x 20 with seat on 9  -Donkey kicks on shuttle 1 red band bilaterally x 10 each    Below not performed this day:   -Precor outer thigh with 40 pounds x 25  -Precor trunk rotation with 15 pounds bilaterally x 20 each  -Nu-step on Level 5 x 8 minutes to improve endurance with activity.   -sidelying clams 10 x 2  -sidelying rev clams 10x 2  -Bridges 2 x 10   -Bridges with froylan Wang participated in neuromuscular re-education activities to improve: Kinesthetic, Sense, Proprioception and Posture for 30 minutes. The following activities were included:  -+rhomboid/yawn stretch with 30 second hold x 3  -+standing snow angels with red band x 20 Not performed today   -+standing cervical extension and bilateral side bending into red ball x 15 each Not performed today   -+ seated cervical retraction with extension x 20 no resistance today secondary to initial complained of upper trap pain  -Prone lower extremity extension x 10 each leg  -+Prone lower extremity extension x 12 each leg with knee bent to 90*  -+supine on full bolster from cervical to sacral with arms abducted x 3 minutes   -Thoracic extension in chair over half bolster x 10  - side crunches 2 x 10 each side   -supermans 3 sec hold 15 x     Below not performed today:  -side lying abduction bilaterally x 10 each leg  -sidelying open book bilaterally x 10 repetitions each side with 3 second hold each with hips at 90 degrees to lock out lumbar spine.   -standing on ramp gastrocnemius stretch with 30 second hold x 3  -sit to stand from 3rd black box with blue weighted ball x 20 added airex pad today  -overhead green ball presses into the wall x 20  -Static hold bridge position with calves on physioball ( red ) 20 sec hold 3 x   -Dynamic bridges on ball 2 x  10 ( can add hamstring curls at next treatment)   -Isometric abdominal strengthening 5 sec hold 15 x 3 positions   -Shuttle 2 black bands 25 x     Felipe received cold  pack for 10 minutes to lumbar region. NP    Patient Education and HEP     He was compliant with home exercise program.    Education provided:   -home exercise program review and emphasized importance of compliance  -pt was educated on the use of lumbar roll for support when driving long distances or sitting for long periods of time.   -+09/19/2022 The patient received an updated written home exercise program. He returned demo to PT and was without questions.     Written Home Exercises Provided: yes.  Exercises were reviewed and Felipe was able to demonstrate them prior to the end of the session.  Felipe demonstrated good  understanding of the education provided.     See EMR under Patient Instructions for exercises provided  09/19/2022 .  Assessment      Today, the patient continued with core strengthening performing side crunches and supermans which will be appropriate for final home exercise program. Pt was also educated on the logistics of performing the strengthening exercises performed on precor machines to be performed at any other fitness club/gym. He verbalized understanding  He performed them well. Additional focus on transitioning pt into a gym or fitness setting will be part of future appointments. Re    Felipe Is progressing well towards his goals.   Pt prognosis is Excellent.      Pt will continue to benefit from skilled outpatient physical therapy to address the deficits listed in the problem list box on initial evaluation, provide pt/family education and to maximize pt's level of independence in the home and community environment.     Pt's spiritual, cultural and educational needs considered and pt agreeable to plan of care and goals.    Anticipated barriers to physical therapy: co-morbidities.    Goals:    STG  Weeks/Visits Date  Established  Date Met   1.Decrease max lumbar and flank pain to 5/10 to allow the patient to get a good night's sleep. 4 weeks. 8/9/2022    Goal Met 9/26/2022        2. Decrease bilateral hamstring and piriformis muscle tone to Moderate Increase to improve sitting position for job tasks. 4 weeks. 8/9/2022    Goal Met 9/26/2022        3.Increase core strength 1/2 grade to improve standing endurance and lifting ability.  4 weeks. 8/9/2022    Goal Met 9/26/2022          4.Decrease FOTO limitation score to <=45% to improve job task ability.  4 weeks. 8/9/2022    In Progress 9/26/2022  48%        5.Fair initial written home exercise program knowledge and be without questions to have carryover after discharge from PT. 4 weeks. 8/9/2022    Goal Met 9/26/2022           LTG Weeks/Visits Date Established  Date Met   1.Decrease max lumbar and flank pain to 2/10 to allow the patient to get a good night's sleep. 8 weeks. 8/9/2022    In Progress 9/26/2022        2. Decrease bilateral hamstring and piriformis muscle tone to minimal increase to improve sitting position for job tasks. 8 weeks. 8/9/2022       In Progress 9/26/2022        3.Increase core strength one grade from evaluation date to improve standing endurance and lifting ability 8 weeks. 8/9/2022    In Progress 9/26/2022        4.Decrease FOTO limitation score to <=40% to improve job task ability.  8 weeks. 8/9/2022    In Progress 9/26/2022        5.Good Progressed written home exercise program knowledge and be without questions to have carryover after discharge from PT. 8 weeks. 8/9/2022    In Progress 9/26/2022       Plan     Plan of care Certification: 8/9/2022 to 10/07/2022. Outpatient Physical Therapy 2 times weekly for 8 weeks. Plan to continue to improve his core strength and flexibility to help provide long term pain relief.        Dinah Loya, PTA

## 2022-09-27 NOTE — PROGRESS NOTES
"[facility]  Outpatient Physical Therapy Daily Treatment Note      Name: Felipe Nava  Clinic Number: [id number]  Visit Date: 9/29/2022  56  Therapy Diagnosis:   Encounter Diagnoses   Name Primary?    Acute bilateral low back pain with right-sided sciatica     Weakness     Abnormal increased muscle tone     Decreased functional mobility and endurance Yes    Special educational needs        Physician: Felipe Villasenor, PA*   Physician Orders: PT Eval and Treat   Medical Diagnosis from Referral: M54.40 (ICD-10-CM) - Low back pain with sciatica, sciatica laterality unspecified, unspecified back pain laterality, unspecified chronicity M62.838 (ICD-10-CM) - Cervical paraspinal muscle spasm   Evaluation Date: 8/9/2022  Authorization Period Expiration: 12/31/2022  Plan of Care Expiration: 10/07/2022 at 2x/wk x 8 weeks  Progress Note Due: 10/06/2022  Visit # / Visits authorized: 15/20 (15/16 on plan of care)   FOTO: 2/ 3     Time In: 2:30 pm   Time Out: 3:23 pm  Total Appointment Time (timed & untimed codes): 45 minutes      Precautions: Standard  Subjective     Today, the patient reports: "I have a little bit of pain, like a 3/10 in the low back and it comes around forward on the right to the front of the hip. But, it is only like a 3/10. I am ready to join a gym so that I can keep doing what I need to in order to keep my gains." PT acknowledges.     Felipe was compliant with home exercise program.    Response to previous treatment: The patient enters with no new complaints.     Functional change: No changes to report since the last PT session.      Pain: 3/10 upon entering the clinic today.  Location: bilateral back      Objective     Felipe received therapeutic exercises to develop strength, endurance, ROM, flexibility, posture and core stabilization for 30 minutes including:  -Elliptical x 8 minutes forward and backwards on Level 3  -Precor back extension with 60 " pounds x 20  -Precor scapular retraction with 35 pounds x 25  -Precor triceps 45 pounds  x 20    -Precor chest press with 15 pounds x 20  -Precor outer thigh with 45 pounds x 20  -Precor leg press with 40 pounds x 25 with seat on 9    Below not performed this day:  +++prone hip extension with knees bent+++  -Donkey kicks on shuttle 1 red band bilaterally x 10 each  -sidelying clams 10 x 2  -sidelying rev clams 10x 2  -Bridges with froylan Wang participated in neuromuscular re-education activities to improve: Kinesthetic, Sense, Proprioception and Posture for 15 minutes. The following activities were included:  -+Quadruped fire-hydrant bilaterally x 15 each  -+pancakes to each side x 10 each  -+hip flexor stretch kneeling bilaterally x 3 each leg with 20 second hold.  -side crunches 2 x 10 each side   -supermans 3 sec hold 15 x     Below not performed today:  -rhomboid/yawn stretch with 30 second hold x 3  -seated cervical retraction with extension x 20 no resistance today secondary to initial complained of upper trap pain  -Prone lower extremity extension x 10 each leg  -Prone lower extremity extension x 12 each leg with knee bent to 90*  -supine on full bolster from cervical to sacral with arms abducted x 3 minutes   -Thoracic extension in chair over half bolster x 10  -side lying abduction bilaterally x 10 each leg  -sidelying open book bilaterally x 10 repetitions each side with 3 second hold each with hips at 90 degrees to lock out lumbar spine.   -sit to stand from 3rd black box with blue weighted ball x 20 added airex pad today  -Isometric abdominal strengthening 5 sec hold 15 x 3 positions     Felipe received cold  pack for 10 minutes to lumbar region. NP    Patient Education and HEP     He was compliant with home exercise program.    Education provided:   -home exercise program review and emphasized importance of compliance  -pt was educated on the use of lumbar roll for support when driving long  distances or sitting for long periods of time.   -09/19/2022 The patient received an updated written home exercise program. He returned demo to PT and was without questions.   -+09/29/2022 The patient received an updated home exercise program today. He returned demo to PT and was without questions.     Written Home Exercises Provided: yes.  Exercises were reviewed and Felipe was able to demonstrate them prior to the end of the session.  Felipe demonstrated good  understanding of the education provided.     See EMR under Patient Instructions for exercises provided  09/29/2022 .  Assessment      Today, the patient worked on hip mobility and strengthening. Bilateral hip flexor tightness noted. He required verbal cues to correctly perform the additional exercises this day. Bilateral gluteal weakness noted. Exercises were modified to allow greater motion. Patient's new exercises appeared on his progressed home program today. He tolerated today's session well. He should be ready to transition to a home program soon. He is compliant with PT.     Felipe Is progressing well towards his goals.   Pt prognosis is Excellent.      Pt will continue to benefit from skilled outpatient physical therapy to address the deficits listed in the problem list box on initial evaluation, provide pt/family education and to maximize pt's level of independence in the home and community environment.     Pt's spiritual, cultural and educational needs considered and pt agreeable to plan of care and goals.    Anticipated barriers to physical therapy: co-morbidities.    Goals:    STG  Weeks/Visits Date Established  Date Met   1.Decrease max lumbar and flank pain to 5/10 to allow the patient to get a good night's sleep. 4 weeks. 8/9/2022    Goal Met 9/29/2022        2. Decrease bilateral hamstring and piriformis muscle tone to Moderate Increase to improve sitting position for job tasks. 4 weeks. 8/9/2022    Goal Met 9/29/2022        3.Increase core  strength 1/2 grade to improve standing endurance and lifting ability.  4 weeks. 8/9/2022    Goal Met 9/29/2022          4.Decrease FOTO limitation score to <=45% to improve job task ability.  4 weeks. 8/9/2022    In Progress 9/29/2022  48%        5.Fair initial written home exercise program knowledge and be without questions to have carryover after discharge from PT. 4 weeks. 8/9/2022    Goal Met 9/29/2022           LTG Weeks/Visits Date Established  Date Met   1.Decrease max lumbar and flank pain to 2/10 to allow the patient to get a good night's sleep. 8 weeks. 8/9/2022    In Progress 9/29/2022        2. Decrease bilateral hamstring and piriformis muscle tone to minimal increase to improve sitting position for job tasks. 8 weeks. 8/9/2022       In Progress 9/29/2022        3.Increase core strength one grade from evaluation date to improve standing endurance and lifting ability 8 weeks. 8/9/2022    In Progress 9/29/2022        4.Decrease FOTO limitation score to <=40% to improve job task ability.  8 weeks. 8/9/2022    In Progress 9/29/2022        5.Good Progressed written home exercise program knowledge and be without questions to have carryover after discharge from PT. 8 weeks. 8/9/2022    In Progress 9/29/2022       Plan     Plan of care Certification: 8/9/2022 to 10/07/2022. Outpatient Physical Therapy 2 times weekly for 8 weeks. Plan to discharge the patient to his home exercise program after the next PT session.         Andres Hernandez, PT

## 2022-09-29 ENCOUNTER — CLINICAL SUPPORT (OUTPATIENT)
Dept: REHABILITATION | Facility: HOSPITAL | Age: 32
End: 2022-09-29
Payer: COMMERCIAL

## 2022-09-29 DIAGNOSIS — R53.1 WEAKNESS: ICD-10-CM

## 2022-09-29 DIAGNOSIS — M62.89 ABNORMAL INCREASED MUSCLE TONE: ICD-10-CM

## 2022-09-29 DIAGNOSIS — Z74.09 DECREASED FUNCTIONAL MOBILITY AND ENDURANCE: Primary | ICD-10-CM

## 2022-09-29 DIAGNOSIS — M54.41 ACUTE BILATERAL LOW BACK PAIN WITH RIGHT-SIDED SCIATICA: ICD-10-CM

## 2022-09-29 DIAGNOSIS — Z55.9 SPECIAL EDUCATIONAL NEEDS: ICD-10-CM

## 2022-09-29 PROCEDURE — 97112 NEUROMUSCULAR REEDUCATION: CPT | Mod: PN

## 2022-09-29 PROCEDURE — 97110 THERAPEUTIC EXERCISES: CPT | Mod: PN

## 2022-09-29 SDOH — SOCIAL DETERMINANTS OF HEALTH (SDOH): PROBLEMS RELATED TO EDUCATION AND LITERACY, UNSPECIFIED: Z55.9

## 2022-10-05 NOTE — PROGRESS NOTES
"[facility]  Outpatient Physical Therapy Daily Treatment Note/Discharge Summary      Name: Felipe Nava  Clinic Number: [id number]  Visit Date: 10/6/2022  56  Therapy Diagnosis:   Encounter Diagnoses   Name Primary?    Acute bilateral low back pain with right-sided sciatica     Weakness Yes    Abnormal increased muscle tone     Decreased functional mobility and endurance     Special educational needs        Physician: Felipe Villasenor, PA*   Physician Orders: PT Eval and Treat   Medical Diagnosis from Referral: M54.40 (ICD-10-CM) - Low back pain with sciatica, sciatica laterality unspecified, unspecified back pain laterality, unspecified chronicity M62.838 (ICD-10-CM) - Cervical paraspinal muscle spasm   Evaluation Date: 8/9/2022  Authorization Period Expiration: 12/31/2022  Plan of Care Expiration: 10/07/2022 at 2x/wk x 8 weeks  Visit # / Visits authorized: 16/20 (16/16 on plan of care)   FOTO: 3/ 3 (3rd completed today at discharge)     Date of Last visit: 10/06/2022  Total Visits Received: 16 with the evaluation    Time In: 2:32 pm   Time Out: 3:27 pm  Total Appointment Time (timed & untimed codes): 50 minutes      Precautions: Standard  Subjective     Today, the patient reports: "I am doing well today. I may be like a 2/10. It is more annoying then painful. I have really come a long way. I am pleased. I sure appreciate your help." PT acknowledges.     Felipe was compliant with home exercise program.    Response to previous treatment: The patient reports the desire to still join the gym to continue with his rehab on his own.      Functional change: The patient reports that his current functional ability is as needed.      Pain: 2/10 upon entering the clinic today. Max within the past 24 hours at 3.5  Location: bilateral back    Objective     Felipe received therapeutic exercises to develop strength, endurance, ROM, flexibility, posture and core stabilization " for 35 minutes including:  -Elliptical x 8 minutes forward and backwards on Level 3  -Precor back extension with 60 pounds x 20  -Precor chest press with 15 pounds x 20  -Precor scapular retraction with 35 pounds x 25  -Precor DIPs/triceps 45 pounds  x 20    -Precor outer thigh with 45 pounds x 20  -Precor leg press with 40 pounds x 25 with seat on 9  -+prone hip extension with knees bent x 10 each leg.  -sidelying clams 10 x 1 with green band bilaterally  -sidelying rev clams 10 x 1 with green band bilaterally    Felipe participated in neuromuscular re-education activities to improve: Kinesthetic, Sense, Proprioception and Posture for 15 minutes. The following activities were included:  -Quadruped fire-hydrant bilaterally x 5 each  -pancakes to each side x 4 each  -hip flexor stretch kneeling bilaterally x 1 each leg with 20 second hold.  -prone hip extension with green band x 5 each  -side planks x 3     Flexibility        Muscle Left Right Comment             Hamstrings Minimal to Moderate Increase >Moderate Increase >Severe Increase Minimal to Moderate Increase >Moderate Increase >Severe Increase     Illipsoas Minimal Increase Minimal Increase     Pirifromis Minimal to Moderate Increase >Moderate Increase >Severe Increase Minimal to Moderate Increase >Moderate Increase >Severe Increase                          MMT     L-spine     Comment     Left Right               Flexion 4+/5 >4/5 >4-/5 -     Extension 4/5 >4-/5 >3+/5 -     Side Bending 4/5 >4-/5 >3+/5 4/5 >4-/5 >3+/5               Abdominals 4/5 >4-/5 >3+/5 -     Lower Abdominals 4/5 >4-/5 >3+/5 -                                              Limitation/Restriction for FOTO Lumbar Survey     Therapist reviewed FOTO scores for Felipe Nava on 09/06/2022   FOTO documents entered into [facility] - see Media section.     Limitation Score: 31% Limitation at discharge > 48% Limitation 09/06/2022 >52% Limitation at the evaluation.         Patient Education and HEP     He  was compliant with home exercise program.    Education provided:   -home exercise program review and emphasized importance of compliance  -pt was educated on the use of lumbar roll for support when driving long distances or sitting for long periods of time.   -09/19/2022 The patient received an updated written home exercise program. He returned demo to PT and was without questions.   -09/29/2022 The patient received an updated home exercise program today. He returned demo to PT and was without questions.     Written Home Exercises Provided: yes.  Exercises were reviewed and Felipe was able to demonstrate them prior to the end of the session.  Felipe demonstrated good  understanding of the education provided.     See EMR under Patient Instructions for exercises provided  10/06/2022 .  Assessment/Discharge Summary      Today, the patient completed his PT plan of care. He met 5 of 5 short term goals. He met 3 of 5 long term goals and progressed on all. His functional ability is as needed. He has minimal pain complaints and his core strength is much improved. He has been educated on how to progress his home program and activity level. He is ready to continue with his exercises at home.      Felipe progressed well towards his goals.   Pt prognosis is Excellent.      Pt's spiritual, cultural and educational needs considered and pt agreeable to plan of care and goals.    Anticipated barriers to physical therapy: co-morbidities.    Goals:    STG  Weeks/Visits Date Established  Date Met   1.Decrease max lumbar and flank pain to 5/10 to allow the patient to get a good night's sleep. 4 weeks. 8/9/2022    Goal Met 10/6/2022        2. Decrease bilateral hamstring and piriformis muscle tone to Moderate Increase to improve sitting position for job tasks. 4 weeks. 8/9/2022    Goal Met 10/6/2022        3.Increase core strength 1/2 grade to improve standing endurance and lifting ability.  4 weeks. 8/9/2022    Goal Met 10/6/2022           4.Decrease FOTO limitation score to <=45% to improve job task ability.  4 weeks. 8/9/2022    Goal Met 10/6/2022        5.Fair initial written home exercise program knowledge and be without questions to have carryover after discharge from PT. 4 weeks. 8/9/2022    Goal Met 10/6/2022           LTG Weeks/Visits Date Established  Date Met   1.Decrease max lumbar and flank pain to 2/10 to allow the patient to get a good night's sleep. 8 weeks. 8/9/2022    In Progress 10/6/2022  3.5 at discharge        2. Decrease bilateral hamstring and piriformis muscle tone to minimal increase to improve sitting position for job tasks. 8 weeks. 8/9/2022       In Progress 10/6/2022  Minimal to Moderate Increase at discharge        3.Increase core strength one grade from evaluation date to improve standing endurance and lifting ability 8 weeks. 8/9/2022    Goal Met           4.Decrease FOTO limitation score to <=40% to improve job task ability.  8 weeks. 8/9/2022    Goal Met 10/6/2022  31% at discharge        5.Good Progressed written home exercise program knowledge and be without questions to have carryover after discharge from PT. 8 weeks. 8/9/2022    Goal Met 10/6/2022       Discharge reason: Patient has completed the physician's prescription, Patient has reached the maximum rehab potential for the present time, and he progressed on all of his goals and is now ready to continue with his exercises at home.     Plan     The patient is discharged to his progressed home program at this time. He is without questions.          Andres Hernandez, PT

## 2022-10-06 ENCOUNTER — CLINICAL SUPPORT (OUTPATIENT)
Dept: REHABILITATION | Facility: HOSPITAL | Age: 32
End: 2022-10-06
Payer: COMMERCIAL

## 2022-10-06 DIAGNOSIS — R53.1 WEAKNESS: Primary | ICD-10-CM

## 2022-10-06 DIAGNOSIS — M54.41 ACUTE BILATERAL LOW BACK PAIN WITH RIGHT-SIDED SCIATICA: ICD-10-CM

## 2022-10-06 DIAGNOSIS — Z74.09 DECREASED FUNCTIONAL MOBILITY AND ENDURANCE: ICD-10-CM

## 2022-10-06 DIAGNOSIS — Z55.9 SPECIAL EDUCATIONAL NEEDS: ICD-10-CM

## 2022-10-06 DIAGNOSIS — M62.89 ABNORMAL INCREASED MUSCLE TONE: ICD-10-CM

## 2022-10-06 PROCEDURE — 97112 NEUROMUSCULAR REEDUCATION: CPT | Mod: PN

## 2022-10-06 PROCEDURE — 97110 THERAPEUTIC EXERCISES: CPT | Mod: PN

## 2022-10-06 SDOH — SOCIAL DETERMINANTS OF HEALTH (SDOH): PROBLEMS RELATED TO EDUCATION AND LITERACY, UNSPECIFIED: Z55.9

## 2022-10-11 ENCOUNTER — OFFICE VISIT (OUTPATIENT)
Dept: FAMILY MEDICINE | Facility: CLINIC | Age: 32
End: 2022-10-11
Payer: COMMERCIAL

## 2022-10-11 VITALS
WEIGHT: 160.19 LBS | DIASTOLIC BLOOD PRESSURE: 62 MMHG | HEART RATE: 77 BPM | HEIGHT: 67 IN | SYSTOLIC BLOOD PRESSURE: 130 MMHG | BODY MASS INDEX: 25.14 KG/M2 | OXYGEN SATURATION: 98 %

## 2022-10-11 DIAGNOSIS — M54.16 ACUTE RIGHT LUMBAR RADICULOPATHY: ICD-10-CM

## 2022-10-11 DIAGNOSIS — M54.2 CERVICALGIA: ICD-10-CM

## 2022-10-11 DIAGNOSIS — S16.1XXD STRAIN OF NECK MUSCLE, SUBSEQUENT ENCOUNTER: ICD-10-CM

## 2022-10-11 DIAGNOSIS — M54.16 LUMBAR RADICULOPATHY, CHRONIC: ICD-10-CM

## 2022-10-11 DIAGNOSIS — M62.830 LUMBAR PARASPINAL MUSCLE SPASM: ICD-10-CM

## 2022-10-11 DIAGNOSIS — M54.41 ACUTE BILATERAL LOW BACK PAIN WITH RIGHT-SIDED SCIATICA: Primary | ICD-10-CM

## 2022-10-11 DIAGNOSIS — M62.838 CERVICAL PARASPINAL MUSCLE SPASM: ICD-10-CM

## 2022-10-11 DIAGNOSIS — M54.2 NECK PAIN: ICD-10-CM

## 2022-10-11 PROCEDURE — 3078F DIAST BP <80 MM HG: CPT | Mod: CPTII,S$GLB,, | Performed by: PHYSICIAN ASSISTANT

## 2022-10-11 PROCEDURE — 99214 PR OFFICE/OUTPT VISIT, EST, LEVL IV, 30-39 MIN: ICD-10-PCS | Mod: S$GLB,,, | Performed by: PHYSICIAN ASSISTANT

## 2022-10-11 PROCEDURE — 3075F SYST BP GE 130 - 139MM HG: CPT | Mod: CPTII,S$GLB,, | Performed by: PHYSICIAN ASSISTANT

## 2022-10-11 PROCEDURE — 99214 OFFICE O/P EST MOD 30 MIN: CPT | Mod: S$GLB,,, | Performed by: PHYSICIAN ASSISTANT

## 2022-10-11 PROCEDURE — 99999 PR PBB SHADOW E&M-EST. PATIENT-LVL V: ICD-10-PCS | Mod: PBBFAC,,, | Performed by: PHYSICIAN ASSISTANT

## 2022-10-11 PROCEDURE — 1159F PR MEDICATION LIST DOCUMENTED IN MEDICAL RECORD: ICD-10-PCS | Mod: CPTII,S$GLB,, | Performed by: PHYSICIAN ASSISTANT

## 2022-10-11 PROCEDURE — 1160F PR REVIEW ALL MEDS BY PRESCRIBER/CLIN PHARMACIST DOCUMENTED: ICD-10-PCS | Mod: CPTII,S$GLB,, | Performed by: PHYSICIAN ASSISTANT

## 2022-10-11 PROCEDURE — 99999 PR PBB SHADOW E&M-EST. PATIENT-LVL V: CPT | Mod: PBBFAC,,, | Performed by: PHYSICIAN ASSISTANT

## 2022-10-11 PROCEDURE — 3075F PR MOST RECENT SYSTOLIC BLOOD PRESS GE 130-139MM HG: ICD-10-PCS | Mod: CPTII,S$GLB,, | Performed by: PHYSICIAN ASSISTANT

## 2022-10-11 PROCEDURE — 1160F RVW MEDS BY RX/DR IN RCRD: CPT | Mod: CPTII,S$GLB,, | Performed by: PHYSICIAN ASSISTANT

## 2022-10-11 PROCEDURE — 3078F PR MOST RECENT DIASTOLIC BLOOD PRESSURE < 80 MM HG: ICD-10-PCS | Mod: CPTII,S$GLB,, | Performed by: PHYSICIAN ASSISTANT

## 2022-10-11 PROCEDURE — 1159F MED LIST DOCD IN RCRD: CPT | Mod: CPTII,S$GLB,, | Performed by: PHYSICIAN ASSISTANT

## 2022-10-11 RX ORDER — CYCLOBENZAPRINE HCL 10 MG
10 TABLET ORAL 3 TIMES DAILY
Qty: 30 TABLET | Refills: 1 | Status: SHIPPED | OUTPATIENT
Start: 2022-10-11 | End: 2023-02-28 | Stop reason: ALTCHOICE

## 2022-10-11 NOTE — PROGRESS NOTES
Subjective:       Patient ID: Felipe Nava is a 31 y.o. male.    Chief Complaint: Follow-up (Discharge from pt thursday)    HPI  Pt. In for f/u since completing PT last wk  Pt. Still has neck and lower back discomfort   Not improving   Pain into R leg   Review of Systems   Constitutional:  Positive for activity change. Negative for appetite change, chills, diaphoresis, fatigue, fever and unexpected weight change.   HENT: Negative.     Eyes: Negative.    Respiratory: Negative.  Negative for cough, shortness of breath and wheezing.    Cardiovascular: Negative.  Negative for chest pain and leg swelling.   Gastrointestinal: Negative.    Endocrine: Negative.    Genitourinary: Negative.    Musculoskeletal:  Positive for arthralgias, back pain, leg pain, myalgias, neck pain and neck stiffness.   Integumentary:  Negative for rash. Negative.   Neurological: Negative.        Objective:      Physical Exam  Vitals reviewed.   Constitutional:       General: He is not in acute distress.     Appearance: Normal appearance. He is normal weight. He is not ill-appearing, toxic-appearing or diaphoretic.   HENT:      Head: Normocephalic and atraumatic.   Eyes:      General: No scleral icterus.     Conjunctiva/sclera: Conjunctivae normal.   Neck:      Comments: Tight and tender paracervical muscles  No cervical vertebral tenderness  Decreased ROM  Tight and tender lumbar paravertebral muscles R>L  L4 and L5 tenderness on percussion with radicular pain into R leg  SLR's 80 degrees L leg without back pain  70 degrees R leg with R lower back pain  Sciatic tenderness R>L  Musculoskeletal:         General: Tenderness and signs of injury present. No swelling or deformity.      Cervical back: Rigidity and tenderness present.      Right lower leg: No edema.      Left lower leg: No edema.   Lymphadenopathy:      Cervical: No cervical adenopathy.   Skin:     General: Skin is warm and dry.      Findings: No rash.   Neurological:      General: No  focal deficit present.      Mental Status: He is alert and oriented to person, place, and time.       Assessment:       Problem List Items Addressed This Visit       Acute bilateral low back pain with right-sided sciatica - Primary     Other Visit Diagnoses       Acute right lumbar radiculopathy        Relevant Orders    Ambulatory referral/consult to [facility]    Lumbar paraspinal muscle spasm        Relevant Medications    cyclobenzaprine (FLEXERIL) 10 MG tablet    Neck pain        Relevant Orders    Ambulatory referral/consult to [facility]    Cervical paraspinal muscle spasm        Lumbar radiculopathy, chronic        Relevant Orders    MRI Lumbar Spine Without Contrast    Cervicalgia        Relevant Orders    MRI Cervical Spine Without Contrast    Strain of neck muscle, subsequent encounter        Relevant Medications    cyclobenzaprine (FLEXERIL) 10 MG tablet            Plan:       Felipe was seen today for follow-up.    Diagnoses and all orders for this visit:    Acute bilateral low back pain with right-sided sciatica    Acute right lumbar radiculopathy  -     Ambulatory referral/consult to [facility]; Future    Lumbar paraspinal muscle spasm  -     cyclobenzaprine (FLEXERIL) 10 MG tablet; Take 1 tablet (10 mg total) by mouth 3 (three) times daily.    Neck pain  -     Ambulatory referral/consult to [facility]; Future    Cervical paraspinal muscle spasm    Lumbar radiculopathy, chronic  -     MRI Lumbar Spine Without Contrast; Future    Cervicalgia  -     MRI Cervical Spine Without Contrast; Future    Strain of neck muscle, subsequent encounter  -     cyclobenzaprine (FLEXERIL) 10 MG tablet; Take 1 tablet (10 mg total) by mouth 3 (three) times daily.   Continue meds  Continue home PT

## 2022-10-20 ENCOUNTER — PATIENT MESSAGE (OUTPATIENT)
Dept: FAMILY MEDICINE | Facility: CLINIC | Age: 32
End: 2022-10-20
Payer: COMMERCIAL

## 2022-10-21 ENCOUNTER — PATIENT MESSAGE (OUTPATIENT)
Dept: REHABILITATION | Facility: HOSPITAL | Age: 32
End: 2022-10-21
Payer: COMMERCIAL

## 2022-11-01 ENCOUNTER — OFFICE VISIT (OUTPATIENT)
Dept: PAIN MEDICINE | Facility: CLINIC | Age: 32
End: 2022-11-01
Payer: COMMERCIAL

## 2022-11-01 VITALS
BODY MASS INDEX: 23.9 KG/M2 | DIASTOLIC BLOOD PRESSURE: 72 MMHG | WEIGHT: 152.25 LBS | HEIGHT: 67 IN | HEART RATE: 73 BPM | SYSTOLIC BLOOD PRESSURE: 116 MMHG

## 2022-11-01 DIAGNOSIS — M54.41 ACUTE BILATERAL LOW BACK PAIN WITH RIGHT-SIDED SCIATICA: Primary | ICD-10-CM

## 2022-11-01 DIAGNOSIS — M54.2 NECK PAIN: ICD-10-CM

## 2022-11-01 DIAGNOSIS — M54.16 ACUTE RIGHT LUMBAR RADICULOPATHY: ICD-10-CM

## 2022-11-01 PROCEDURE — 1160F PR REVIEW ALL MEDS BY PRESCRIBER/CLIN PHARMACIST DOCUMENTED: ICD-10-PCS | Mod: CPTII,S$GLB,, | Performed by: PHYSICIAN ASSISTANT

## 2022-11-01 PROCEDURE — 3074F PR MOST RECENT SYSTOLIC BLOOD PRESSURE < 130 MM HG: ICD-10-PCS | Mod: CPTII,S$GLB,, | Performed by: PHYSICIAN ASSISTANT

## 2022-11-01 PROCEDURE — 3074F SYST BP LT 130 MM HG: CPT | Mod: CPTII,S$GLB,, | Performed by: PHYSICIAN ASSISTANT

## 2022-11-01 PROCEDURE — 1160F RVW MEDS BY RX/DR IN RCRD: CPT | Mod: CPTII,S$GLB,, | Performed by: PHYSICIAN ASSISTANT

## 2022-11-01 PROCEDURE — 1159F MED LIST DOCD IN RCRD: CPT | Mod: CPTII,S$GLB,, | Performed by: PHYSICIAN ASSISTANT

## 2022-11-01 PROCEDURE — 3008F PR BODY MASS INDEX (BMI) DOCUMENTED: ICD-10-PCS | Mod: CPTII,S$GLB,, | Performed by: PHYSICIAN ASSISTANT

## 2022-11-01 PROCEDURE — 99204 PR OFFICE/OUTPT VISIT, NEW, LEVL IV, 45-59 MIN: ICD-10-PCS | Mod: S$GLB,,, | Performed by: PHYSICIAN ASSISTANT

## 2022-11-01 PROCEDURE — 99999 PR PBB SHADOW E&M-EST. PATIENT-LVL III: ICD-10-PCS | Mod: PBBFAC,,, | Performed by: PHYSICIAN ASSISTANT

## 2022-11-01 PROCEDURE — 3008F BODY MASS INDEX DOCD: CPT | Mod: CPTII,S$GLB,, | Performed by: PHYSICIAN ASSISTANT

## 2022-11-01 PROCEDURE — 1159F PR MEDICATION LIST DOCUMENTED IN MEDICAL RECORD: ICD-10-PCS | Mod: CPTII,S$GLB,, | Performed by: PHYSICIAN ASSISTANT

## 2022-11-01 PROCEDURE — 3078F PR MOST RECENT DIASTOLIC BLOOD PRESSURE < 80 MM HG: ICD-10-PCS | Mod: CPTII,S$GLB,, | Performed by: PHYSICIAN ASSISTANT

## 2022-11-01 PROCEDURE — 99999 PR PBB SHADOW E&M-EST. PATIENT-LVL III: CPT | Mod: PBBFAC,,, | Performed by: PHYSICIAN ASSISTANT

## 2022-11-01 PROCEDURE — 99204 OFFICE O/P NEW MOD 45 MIN: CPT | Mod: S$GLB,,, | Performed by: PHYSICIAN ASSISTANT

## 2022-11-01 PROCEDURE — 3078F DIAST BP <80 MM HG: CPT | Mod: CPTII,S$GLB,, | Performed by: PHYSICIAN ASSISTANT

## 2022-11-01 RX ORDER — METHYLPREDNISOLONE 4 MG/1
TABLET ORAL
Qty: 1 EACH | Refills: 0 | Status: SHIPPED | OUTPATIENT
Start: 2022-11-01 | End: 2022-11-18
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
## 2022-11-18 ENCOUNTER — OFFICE VISIT (OUTPATIENT)
Dept: FAMILY MEDICINE | Facility: CLINIC | Age: 32
End: 2022-11-18
Payer: COMMERCIAL

## 2022-11-18 VITALS
HEART RATE: 78 BPM | TEMPERATURE: 98 F | WEIGHT: 164.25 LBS | DIASTOLIC BLOOD PRESSURE: 76 MMHG | BODY MASS INDEX: 25.72 KG/M2 | OXYGEN SATURATION: 97 % | SYSTOLIC BLOOD PRESSURE: 122 MMHG

## 2022-11-18 DIAGNOSIS — Z11.52 ENCOUNTER FOR SCREENING FOR COVID-19: ICD-10-CM

## 2022-11-18 DIAGNOSIS — B96.89 ACUTE BACTERIAL SINUSITIS: Primary | ICD-10-CM

## 2022-11-18 DIAGNOSIS — J01.90 ACUTE BACTERIAL SINUSITIS: Primary | ICD-10-CM

## 2022-11-18 LAB
CTP QC/QA: YES
POC MOLECULAR INFLUENZA A AGN: NEGATIVE
POC MOLECULAR INFLUENZA B AGN: NEGATIVE
S PYO RRNA THROAT QL PROBE: NEGATIVE
SARS-COV-2 RDRP RESP QL NAA+PROBE: NEGATIVE

## 2022-11-18 PROCEDURE — 3074F PR MOST RECENT SYSTOLIC BLOOD PRESSURE < 130 MM HG: ICD-10-PCS | Mod: CPTII,S$GLB,, | Performed by: STUDENT IN AN ORGANIZED HEALTH CARE EDUCATION/TRAINING PROGRAM

## 2022-11-18 PROCEDURE — 99214 OFFICE O/P EST MOD 30 MIN: CPT | Mod: S$GLB,,, | Performed by: STUDENT IN AN ORGANIZED HEALTH CARE EDUCATION/TRAINING PROGRAM

## 2022-11-18 PROCEDURE — 87635 SARS-COV-2 COVID-19 AMP PRB: CPT | Mod: QW,S$GLB,, | Performed by: STUDENT IN AN ORGANIZED HEALTH CARE EDUCATION/TRAINING PROGRAM

## 2022-11-18 PROCEDURE — 3008F BODY MASS INDEX DOCD: CPT | Mod: CPTII,S$GLB,, | Performed by: STUDENT IN AN ORGANIZED HEALTH CARE EDUCATION/TRAINING PROGRAM

## 2022-11-18 PROCEDURE — 99214 PR OFFICE/OUTPT VISIT, EST, LEVL IV, 30-39 MIN: ICD-10-PCS | Mod: S$GLB,,, | Performed by: STUDENT IN AN ORGANIZED HEALTH CARE EDUCATION/TRAINING PROGRAM

## 2022-11-18 PROCEDURE — 87880 STREP A ASSAY W/OPTIC: CPT | Mod: QW,S$GLB,, | Performed by: STUDENT IN AN ORGANIZED HEALTH CARE EDUCATION/TRAINING PROGRAM

## 2022-11-18 PROCEDURE — 1160F PR REVIEW ALL MEDS BY PRESCRIBER/CLIN PHARMACIST DOCUMENTED: ICD-10-PCS | Mod: CPTII,S$GLB,, | Performed by: STUDENT IN AN ORGANIZED HEALTH CARE EDUCATION/TRAINING PROGRAM

## 2022-11-18 PROCEDURE — 1159F MED LIST DOCD IN RCRD: CPT | Mod: CPTII,S$GLB,, | Performed by: STUDENT IN AN ORGANIZED HEALTH CARE EDUCATION/TRAINING PROGRAM

## 2022-11-18 PROCEDURE — 3074F SYST BP LT 130 MM HG: CPT | Mod: CPTII,S$GLB,, | Performed by: STUDENT IN AN ORGANIZED HEALTH CARE EDUCATION/TRAINING PROGRAM

## 2022-11-18 PROCEDURE — 87502 POCT INFLUENZA A/B MOLECULAR: ICD-10-PCS | Mod: QW,S$GLB,, | Performed by: STUDENT IN AN ORGANIZED HEALTH CARE EDUCATION/TRAINING PROGRAM

## 2022-11-18 PROCEDURE — 1160F RVW MEDS BY RX/DR IN RCRD: CPT | Mod: CPTII,S$GLB,, | Performed by: STUDENT IN AN ORGANIZED HEALTH CARE EDUCATION/TRAINING PROGRAM

## 2022-11-18 PROCEDURE — 87635: ICD-10-PCS | Mod: QW,S$GLB,, | Performed by: STUDENT IN AN ORGANIZED HEALTH CARE EDUCATION/TRAINING PROGRAM

## 2022-11-18 PROCEDURE — 87880 POCT RAPID STREP A: ICD-10-PCS | Mod: QW,S$GLB,, | Performed by: STUDENT IN AN ORGANIZED HEALTH CARE EDUCATION/TRAINING PROGRAM

## 2022-11-18 PROCEDURE — 1159F PR MEDICATION LIST DOCUMENTED IN MEDICAL RECORD: ICD-10-PCS | Mod: CPTII,S$GLB,, | Performed by: STUDENT IN AN ORGANIZED HEALTH CARE EDUCATION/TRAINING PROGRAM

## 2022-11-18 PROCEDURE — 99999 PR PBB SHADOW E&M-EST. PATIENT-LVL III: ICD-10-PCS | Mod: PBBFAC,,, | Performed by: STUDENT IN AN ORGANIZED HEALTH CARE EDUCATION/TRAINING PROGRAM

## 2022-11-18 PROCEDURE — 3008F PR BODY MASS INDEX (BMI) DOCUMENTED: ICD-10-PCS | Mod: CPTII,S$GLB,, | Performed by: STUDENT IN AN ORGANIZED HEALTH CARE EDUCATION/TRAINING PROGRAM

## 2022-11-18 PROCEDURE — 3078F PR MOST RECENT DIASTOLIC BLOOD PRESSURE < 80 MM HG: ICD-10-PCS | Mod: CPTII,S$GLB,, | Performed by: STUDENT IN AN ORGANIZED HEALTH CARE EDUCATION/TRAINING PROGRAM

## 2022-11-18 PROCEDURE — 99999 PR PBB SHADOW E&M-EST. PATIENT-LVL III: CPT | Mod: PBBFAC,,, | Performed by: STUDENT IN AN ORGANIZED HEALTH CARE EDUCATION/TRAINING PROGRAM

## 2022-11-18 PROCEDURE — 87502 INFLUENZA DNA AMP PROBE: CPT | Mod: QW,S$GLB,, | Performed by: STUDENT IN AN ORGANIZED HEALTH CARE EDUCATION/TRAINING PROGRAM

## 2022-11-18 PROCEDURE — 3078F DIAST BP <80 MM HG: CPT | Mod: CPTII,S$GLB,, | Performed by: STUDENT IN AN ORGANIZED HEALTH CARE EDUCATION/TRAINING PROGRAM

## 2022-11-18 RX ORDER — DEXAMETHASONE 2 MG/1
TABLET ORAL
Qty: 20 TABLET | Refills: 0 | Status: SHIPPED | OUTPATIENT
Start: 2022-11-18 | End: 2023-02-28 | Stop reason: ALTCHOICE

## 2022-11-18 RX ORDER — PROMETHAZINE HYDROCHLORIDE AND DEXTROMETHORPHAN HYDROBROMIDE 6.25; 15 MG/5ML; MG/5ML
5 SYRUP ORAL 3 TIMES DAILY PRN
Qty: 118 ML | Refills: 0 | Status: SHIPPED | OUTPATIENT
Start: 2022-11-18 | End: 2022-11-28

## 2022-11-18 RX ORDER — TADALAFIL 5 MG/1
5 TABLET ORAL DAILY PRN
COMMUNITY

## 2022-11-18 RX ORDER — AMOXICILLIN AND CLAVULANATE POTASSIUM 875; 125 MG/1; MG/1
1 TABLET, FILM COATED ORAL EVERY 12 HOURS
Qty: 14 TABLET | Refills: 0 | Status: SHIPPED | OUTPATIENT
Start: 2022-11-18 | End: 2023-02-28 | Stop reason: ALTCHOICE

## 2022-11-18 NOTE — PROGRESS NOTES
Received a request from [facility] with [facility]) requesting (H&P, Progress notes, Labs, MRI,Mammogram). Routed all requested documents over to [facility] [phone]. Subjective:       Patient ID: Felipe Nava is a 31 y.o. male.    Chief Complaint: Sore Throat and Nasal Congestion      Reports nasal and sinus congestion, sinus pain, purulent rhinorrhea, dry cough x 7 days, not relieved by supportive measures    Sore Throat   Associated symptoms include congestion, coughing and headaches. Pertinent negatives include no abdominal pain, diarrhea, ear pain, shortness of breath or vomiting.     Review of Systems   Constitutional:  Positive for malaise/fatigue. Negative for chills and fever.   HENT:  Positive for congestion, sinus pain and sore throat. Negative for ear pain.    Eyes:  Negative for discharge and redness.   Respiratory:  Positive for cough. Negative for sputum production, shortness of breath and wheezing.    Cardiovascular:  Negative for chest pain and palpitations.   Gastrointestinal:  Negative for abdominal pain, constipation, diarrhea, heartburn, nausea and vomiting.   Genitourinary:  Negative for dysuria.   Musculoskeletal:  Negative for myalgias.   Skin:  Negative for rash.   Neurological:  Positive for headaches. Negative for dizziness and weakness.      Objective:      Vitals:    11/18/22 1525   BP: 122/76   Pulse: 78   Temp: 98.4 °F (36.9 °C)   SpO2: 97%   Weight: 74.5 kg (164 lb 3.9 oz)      Physical Exam  Constitutional:       General: He is not in acute distress.  HENT:      Left Ear: Tympanic membrane and ear canal normal.      Ears:      Comments: + erythema, air/fluid level, clear fluid, no opacification, bulging, loss of landmarks. No tenderness of tragus and pinna, No ear canal edema and erythema.      Nose: Congestion and rhinorrhea present.      Comments: + left maxillary sinus tenderness     Mouth/Throat:      Mouth: Mucous membranes are moist.      Pharynx: No oropharyngeal exudate or posterior oropharyngeal erythema.   Eyes:      General: No scleral icterus.     Conjunctiva/sclera: Conjunctivae normal.   Cardiovascular:      Rate and Rhythm: Normal  rate and regular rhythm.      Pulses: Normal pulses.   Pulmonary:      Comments: Respirations symmetric and not labored, Lungs are clear to auscultation.  Musculoskeletal:      Cervical back: Neck supple. No tenderness.      Right lower leg: No edema.      Left lower leg: No edema.   Lymphadenopathy:      Cervical: No cervical adenopathy.   Neurological:      Mental Status: He is alert.        Assessment:       1. Acute bacterial sinusitis    2. Encounter for screening for COVID-19          Plan:       Acute bacterial sinusitis  -     dexAMETHasone (DECADRON) 2 MG tablet; Take 3 tablets daily x 2 days, take 2 tablets daily x 2 days, take 1 tablet daily x 2 days  Dispense: 20 tablet; Refill: 0  -     amoxicillin-clavulanate 875-125mg (AUGMENTIN) 875-125 mg per tablet; Take 1 tablet by mouth every 12 (twelve) hours.  Dispense: 14 tablet; Refill: 0  -     promethazine-dextromethorphan (PROMETHAZINE-DM) 6.25-15 mg/5 mL Syrp; Take 5 mLs by mouth 3 (three) times daily as needed (cough).  Dispense: 118 mL; Refill: 0  Given tenderness, reported purulence, duration of symptoms, will stat antibiotics, oral steroid taper, supportive measures, monitor for improvement, return to clinic if worsening or refractory, ED/medication/infection precautions provided    Encounter for screening for COVID-19  -     POCT Rapid Strep A  -     POCT Influenza A/B Molecular  -     POCT COVID-19 Rapid Screening    Risks, benefits, alternatives discussed with patient, asked if there were any questions, patient said no  Precautions, counseling and education provided, patient verbalized understanding

## 2023-02-27 ENCOUNTER — OFFICE VISIT (OUTPATIENT)
Dept: FAMILY MEDICINE | Facility: CLINIC | Age: 33
End: 2023-02-27
Payer: COMMERCIAL

## 2023-02-27 ENCOUNTER — HOSPITAL ENCOUNTER (OUTPATIENT)
Dept: RADIOLOGY | Facility: HOSPITAL | Age: 33
Discharge: HOME | End: 2023-02-27
Attending: FAMILY MEDICINE
Payer: COMMERCIAL

## 2023-02-27 VITALS
SYSTOLIC BLOOD PRESSURE: 122 MMHG | RESPIRATION RATE: 14 BRPM | DIASTOLIC BLOOD PRESSURE: 72 MMHG | HEART RATE: 69 BPM | WEIGHT: 161.06 LBS | BODY MASS INDEX: 25.28 KG/M2 | HEIGHT: 67 IN

## 2023-02-27 DIAGNOSIS — R63.4 WEIGHT LOSS: ICD-10-CM

## 2023-02-27 DIAGNOSIS — J06.9 UPPER RESPIRATORY TRACT INFECTION, UNSPECIFIED TYPE: Primary | ICD-10-CM

## 2023-02-27 DIAGNOSIS — F41.8 ANXIETY WITH DEPRESSION: ICD-10-CM

## 2023-02-27 DIAGNOSIS — Z00.00 ROUTINE GENERAL MEDICAL EXAMINATION AT A HEALTH CARE FACILITY: ICD-10-CM

## 2023-02-27 PROCEDURE — 71046 X-RAY EXAM CHEST 2 VIEWS: CPT | Mod: TC,PN

## 2023-02-27 PROCEDURE — 99999 PR PBB SHADOW E&M-EST. PATIENT-LVL III: CPT | Mod: PBBFAC,,, | Performed by: FAMILY MEDICINE

## 2023-02-27 PROCEDURE — 3008F PR BODY MASS INDEX (BMI) DOCUMENTED: ICD-10-PCS | Mod: CPTII,S$GLB,, | Performed by: FAMILY MEDICINE

## 2023-02-27 PROCEDURE — 99214 OFFICE O/P EST MOD 30 MIN: CPT | Mod: S$GLB,,, | Performed by: FAMILY MEDICINE

## 2023-02-27 PROCEDURE — 3078F DIAST BP <80 MM HG: CPT | Mod: CPTII,S$GLB,, | Performed by: FAMILY MEDICINE

## 2023-02-27 PROCEDURE — 99214 PR OFFICE/OUTPT VISIT, EST, LEVL IV, 30-39 MIN: ICD-10-PCS | Mod: S$GLB,,, | Performed by: FAMILY MEDICINE

## 2023-02-27 PROCEDURE — 87502 INFLUENZA DNA AMP PROBE: CPT | Mod: QW,S$GLB,, | Performed by: FAMILY MEDICINE

## 2023-02-27 PROCEDURE — 3074F SYST BP LT 130 MM HG: CPT | Mod: CPTII,S$GLB,, | Performed by: FAMILY MEDICINE

## 2023-02-27 PROCEDURE — 71046 XR CHEST PA AND LATERAL: ICD-10-PCS | Mod: 26,,, | Performed by: RADIOLOGY

## 2023-02-27 PROCEDURE — 87635 SARS-COV-2 COVID-19 AMP PRB: CPT | Mod: QW,S$GLB,, | Performed by: FAMILY MEDICINE

## 2023-02-27 PROCEDURE — 87635: ICD-10-PCS | Mod: QW,S$GLB,, | Performed by: FAMILY MEDICINE

## 2023-02-27 PROCEDURE — 87880 STREP A ASSAY W/OPTIC: CPT | Mod: QW,S$GLB,, | Performed by: FAMILY MEDICINE

## 2023-02-27 PROCEDURE — 87502 POCT INFLUENZA A/B MOLECULAR: ICD-10-PCS | Mod: QW,S$GLB,, | Performed by: FAMILY MEDICINE

## 2023-02-27 PROCEDURE — 1159F PR MEDICATION LIST DOCUMENTED IN MEDICAL RECORD: ICD-10-PCS | Mod: CPTII,S$GLB,, | Performed by: FAMILY MEDICINE

## 2023-02-27 PROCEDURE — 3078F PR MOST RECENT DIASTOLIC BLOOD PRESSURE < 80 MM HG: ICD-10-PCS | Mod: CPTII,S$GLB,, | Performed by: FAMILY MEDICINE

## 2023-02-27 PROCEDURE — 3074F PR MOST RECENT SYSTOLIC BLOOD PRESSURE < 130 MM HG: ICD-10-PCS | Mod: CPTII,S$GLB,, | Performed by: FAMILY MEDICINE

## 2023-02-27 PROCEDURE — 99999 PR PBB SHADOW E&M-EST. PATIENT-LVL III: ICD-10-PCS | Mod: PBBFAC,,, | Performed by: FAMILY MEDICINE

## 2023-02-27 PROCEDURE — 1160F RVW MEDS BY RX/DR IN RCRD: CPT | Mod: CPTII,S$GLB,, | Performed by: FAMILY MEDICINE

## 2023-02-27 PROCEDURE — 1159F MED LIST DOCD IN RCRD: CPT | Mod: CPTII,S$GLB,, | Performed by: FAMILY MEDICINE

## 2023-02-27 PROCEDURE — 71046 X-RAY EXAM CHEST 2 VIEWS: CPT | Mod: 26,,, | Performed by: RADIOLOGY

## 2023-02-27 PROCEDURE — 3008F BODY MASS INDEX DOCD: CPT | Mod: CPTII,S$GLB,, | Performed by: FAMILY MEDICINE

## 2023-02-27 PROCEDURE — 87880 POCT RAPID STREP A: ICD-10-PCS | Mod: QW,S$GLB,, | Performed by: FAMILY MEDICINE

## 2023-02-27 PROCEDURE — 1160F PR REVIEW ALL MEDS BY PRESCRIBER/CLIN PHARMACIST DOCUMENTED: ICD-10-PCS | Mod: CPTII,S$GLB,, | Performed by: FAMILY MEDICINE

## 2023-02-27 NOTE — PROGRESS NOTES
"  Subjective:       Patient ID: Felipe Nava is a 32 y.o. male.    Chief Complaint: Fatigue (C/O cold and fatigue daily even when outside. C/O "sweating while I am cold") and Mood (Saw psychiatrist in past. Was inpatient back in 2017. Pt had to stop his mood medications d/t insurance and cost. Reports his girlfriend noted increase mood swings and anxiety.)    Fatigue  Associated symptoms include fatigue, myalgias and nausea (mild). Pertinent negatives include no congestion, coughing or headaches.     +cough and body aches, fever yesterday. Sx onset 1 week ago.   However, has also felt cold and tired for the last month.   Trouble focusing on things. Increased mood swings and anxiety. Doesn't feel as well/stable as he would normally over the last 2 mos.   Sleeps ok.   Down 10# over the last week with decreased appetite, n/v with some meals. Has been trying to drink more bottled water.   Quit vaping in January, had some sx for 3 days but was fine after.    Taking mvt, probiotic, naproxen.     Review of Systems:  Review of Systems   Constitutional:  Positive for appetite change, fatigue and unexpected weight change (down 10# over the last few weeks).   HENT:  Negative for congestion, postnasal drip and sinus pressure.    Respiratory:  Negative for cough and shortness of breath.    Gastrointestinal:  Positive for nausea (mild). Negative for diarrhea.   Genitourinary:  Negative for difficulty urinating and frequency.   Musculoskeletal:  Positive for myalgias. Negative for gait problem.   Neurological:  Negative for dizziness and headaches.   Psychiatric/Behavioral:  Positive for sleep disturbance. The patient is nervous/anxious.        Objective:     Vitals:    02/27/23 0919   BP: 122/72   BP Location: Left arm   Patient Position: Sitting   BP Method: Medium (Manual)   Pulse: 69   Resp: 14   Weight: 73.1 kg (161 lb 0.7 oz)   Height: 5' 7" (1.702 m)          Physical Exam  Vitals and nursing note reviewed. "   Constitutional:       Appearance: Normal appearance. He is well-developed.   HENT:      Head: Normocephalic and atraumatic.      Right Ear: Tympanic membrane normal.      Left Ear: Tympanic membrane normal.   Eyes:      General: No scleral icterus.     Conjunctiva/sclera: Conjunctivae normal.      Pupils: Pupils are equal, round, and reactive to light.   Neck:      Thyroid: No thyromegaly.   Cardiovascular:      Rate and Rhythm: Normal rate and regular rhythm.      Heart sounds: Normal heart sounds. No murmur heard.    No friction rub. No gallop.   Pulmonary:      Effort: Pulmonary effort is normal. No respiratory distress.      Breath sounds: Normal breath sounds. No wheezing or rales.   Abdominal:      General: Bowel sounds are normal. There is no distension.      Palpations: Abdomen is soft.      Tenderness: There is no abdominal tenderness. There is no guarding.   Musculoskeletal:      Cervical back: Neck supple.   Lymphadenopathy:      Cervical: No cervical adenopathy.   Skin:     General: Skin is warm and dry.   Neurological:      General: No focal deficit present.      Mental Status: He is alert and oriented to person, place, and time.   Psychiatric:         Mood and Affect: Mood normal.         Behavior: Behavior normal.         Assessment & Plan:  Upper respiratory tract infection, unspecified type  Comments:  presumptive uri given generalized sx  discussed otcs, hydration, sx monitoring  expected course reviewed - discussed f/u for persistent sx    Routine general medical examination at a health care facility  -     CBC Without Differential; Future; Expected date: 02/27/2023  -     Comprehensive Metabolic Panel; Future; Expected date: 02/27/2023  -     TSH; Future; Expected date: 02/27/2023  -     Urinalysis, Reflex to Urine Culture Urine, Clean Catch; Future  -     Vitamin B12; Future; Expected date: 02/27/2023  -     Lipid Panel; Future; Expected date: 02/27/2023  -     Iron; Future; Expected date:  02/27/2023  -     Testosterone; Future; Expected date: 02/27/2023  -     Hepatitis C Antibody; Future; Expected date: 02/27/2023  -     POCT Influenza A/B Molecular  -     POCT Rapid Strep A  -     POCT COVID-19 Rapid Screening  -     X-Ray Chest PA And Lateral; Future; Expected date: 02/27/2023    Anxiety with depression  Comments:  needs to re-establish care with psychology re sx, counseling  he does not feel that anything is urgent and would require immediate med mgmt  Orders:  -     Ambulatory referral/consult to Psychology; Future; Expected date: 03/06/2023    Weight loss  Comments:  bland diet as tolerated  ensure adequate hydration  needs lab update

## 2023-02-28 DIAGNOSIS — E87.0 SERUM SODIUM ELEVATED: Primary | ICD-10-CM

## 2023-03-16 ENCOUNTER — TELEPHONE (OUTPATIENT)
Dept: PSYCHIATRY | Facility: CLINIC | Age: 33
End: 2023-03-16
Payer: COMMERCIAL

## 2023-03-16 NOTE — TELEPHONE ENCOUNTER
"                     For documentation purpose      Patient called our [place] [facility] and left a very angry message on our office phone line.  Patient was verbally upset. Used some chosen words ie would self harm (if that's what it takes to get someone's attention.    Returned patients phone call.  No SI/HI -     Patient apologized for the way he spoke / message.     States, " I am very sorry for the message I left" I'm just so frustrated that no one returned may phone call" I tried a couple location and nobody called back." I felt like talking that way would get someone's attention."    Informed patient if a message was left at our [facility] a team member at this location would've called back.    Patient is aware theres a waitlist for medication management and we are on a hold for new therapy appointment due to the lost of 3 therapist back to back.    Informed patient that yes,  placed a referral in his chart -February.     Patient would like get back on mood stabilizers.   Beatriz                   "

## 2023-03-29 ENCOUNTER — OFFICE VISIT (OUTPATIENT)
Dept: PSYCHIATRY | Facility: CLINIC | Age: 33
End: 2023-03-29
Payer: COMMERCIAL

## 2023-03-29 VITALS
WEIGHT: 154.88 LBS | BODY MASS INDEX: 24.31 KG/M2 | DIASTOLIC BLOOD PRESSURE: 79 MMHG | SYSTOLIC BLOOD PRESSURE: 137 MMHG | HEIGHT: 67 IN | HEART RATE: 72 BPM

## 2023-03-29 DIAGNOSIS — F41.8 ANXIETY WITH DEPRESSION: ICD-10-CM

## 2023-03-29 DIAGNOSIS — F43.10 PTSD (POST-TRAUMATIC STRESS DISORDER): ICD-10-CM

## 2023-03-29 PROCEDURE — 99999 PR PBB SHADOW E&M-EST. PATIENT-LVL III: ICD-10-PCS | Mod: PBBFAC,,, | Performed by: PSYCHOLOGIST

## 2023-03-29 PROCEDURE — 3075F SYST BP GE 130 - 139MM HG: CPT | Mod: CPTII,S$GLB,, | Performed by: PSYCHOLOGIST

## 2023-03-29 PROCEDURE — 3008F PR BODY MASS INDEX (BMI) DOCUMENTED: ICD-10-PCS | Mod: CPTII,S$GLB,, | Performed by: PSYCHOLOGIST

## 2023-03-29 PROCEDURE — 3078F DIAST BP <80 MM HG: CPT | Mod: CPTII,S$GLB,, | Performed by: PSYCHOLOGIST

## 2023-03-29 PROCEDURE — 90792 PR PSYCHIATRIC DIAGNOSTIC EVALUATION W/MEDICAL SERVICES: ICD-10-PCS | Mod: S$GLB,,, | Performed by: PSYCHOLOGIST

## 2023-03-29 PROCEDURE — 1159F PR MEDICATION LIST DOCUMENTED IN MEDICAL RECORD: ICD-10-PCS | Mod: CPTII,S$GLB,, | Performed by: PSYCHOLOGIST

## 2023-03-29 PROCEDURE — 90792 PSYCH DIAG EVAL W/MED SRVCS: CPT | Mod: S$GLB,,, | Performed by: PSYCHOLOGIST

## 2023-03-29 PROCEDURE — 3008F BODY MASS INDEX DOCD: CPT | Mod: CPTII,S$GLB,, | Performed by: PSYCHOLOGIST

## 2023-03-29 PROCEDURE — 99999 PR PBB SHADOW E&M-EST. PATIENT-LVL III: CPT | Mod: PBBFAC,,, | Performed by: PSYCHOLOGIST

## 2023-03-29 PROCEDURE — 3075F PR MOST RECENT SYSTOLIC BLOOD PRESS GE 130-139MM HG: ICD-10-PCS | Mod: CPTII,S$GLB,, | Performed by: PSYCHOLOGIST

## 2023-03-29 PROCEDURE — 3078F PR MOST RECENT DIASTOLIC BLOOD PRESSURE < 80 MM HG: ICD-10-PCS | Mod: CPTII,S$GLB,, | Performed by: PSYCHOLOGIST

## 2023-03-29 PROCEDURE — 1159F MED LIST DOCD IN RCRD: CPT | Mod: CPTII,S$GLB,, | Performed by: PSYCHOLOGIST

## 2023-03-29 RX ORDER — FLUOXETINE HYDROCHLORIDE 20 MG/1
20 CAPSULE ORAL DAILY
Qty: 30 CAPSULE | Refills: 1 | Status: SHIPPED | OUTPATIENT
Start: 2023-03-29 | End: 2023-05-16
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
## 2023-03-29 NOTE — LETTER
March 29, 2023        Gladys Rae MD  [address] [facility] [address]  [facility] [address]             [facility]  [address] [facility] [address]  Phone: [phone]   Patient: Felipe Nava   MR Number: [id number]   YOB: 1990   Date of Visit: 3/29/2023       Dear Dr. Rae:    Thank you for referring Felipe Nava to me for evaluation. Below are the relevant portions of my assessment and plan of care.    Pt is to restart Prozac 20mg once daily and follow-up as soon as possible to finish the diagnostic interview. Will refer to psychotherapy and pt was encouraged to start a cardio excise regimen.    If you have questions, please do not hesitate to call me. I look forward to following Felipe along with you.    Sincerely,      Jose A Beaver, PhD, MP           CC    No Recipients

## 2023-03-31 ENCOUNTER — PATIENT MESSAGE (OUTPATIENT)
Dept: PSYCHIATRY | Facility: CLINIC | Age: 33
End: 2023-03-31
Payer: COMMERCIAL

## 2023-04-04 ENCOUNTER — OFFICE VISIT (OUTPATIENT)
Dept: PSYCHIATRY | Facility: CLINIC | Age: 33
End: 2023-04-04
Payer: COMMERCIAL

## 2023-04-04 VITALS
HEART RATE: 59 BPM | DIASTOLIC BLOOD PRESSURE: 89 MMHG | BODY MASS INDEX: 24.43 KG/M2 | WEIGHT: 155.63 LBS | HEIGHT: 67 IN | SYSTOLIC BLOOD PRESSURE: 130 MMHG

## 2023-04-04 DIAGNOSIS — F43.10 PTSD (POST-TRAUMATIC STRESS DISORDER): Primary | ICD-10-CM

## 2023-04-04 PROCEDURE — 99999 PR PBB SHADOW E&M-EST. PATIENT-LVL III: ICD-10-PCS | Mod: PBBFAC,,, | Performed by: PSYCHOLOGIST

## 2023-04-04 PROCEDURE — 1159F MED LIST DOCD IN RCRD: CPT | Mod: CPTII,S$GLB,, | Performed by: PSYCHOLOGIST

## 2023-04-04 PROCEDURE — 3008F BODY MASS INDEX DOCD: CPT | Mod: CPTII,S$GLB,, | Performed by: PSYCHOLOGIST

## 2023-04-04 PROCEDURE — 3075F SYST BP GE 130 - 139MM HG: CPT | Mod: CPTII,S$GLB,, | Performed by: PSYCHOLOGIST

## 2023-04-04 PROCEDURE — 3079F PR MOST RECENT DIASTOLIC BLOOD PRESSURE 80-89 MM HG: ICD-10-PCS | Mod: CPTII,S$GLB,, | Performed by: PSYCHOLOGIST

## 2023-04-04 PROCEDURE — 90833 PR PSYCHOTHERAPY W/PATIENT W/E&M, 30 MIN (ADD ON): ICD-10-PCS | Mod: S$GLB,,, | Performed by: PSYCHOLOGIST

## 2023-04-04 PROCEDURE — 1159F PR MEDICATION LIST DOCUMENTED IN MEDICAL RECORD: ICD-10-PCS | Mod: CPTII,S$GLB,, | Performed by: PSYCHOLOGIST

## 2023-04-04 PROCEDURE — 3008F PR BODY MASS INDEX (BMI) DOCUMENTED: ICD-10-PCS | Mod: CPTII,S$GLB,, | Performed by: PSYCHOLOGIST

## 2023-04-04 PROCEDURE — 99214 PR OFFICE/OUTPT VISIT, EST, LEVL IV, 30-39 MIN: ICD-10-PCS | Mod: S$GLB,,, | Performed by: PSYCHOLOGIST

## 2023-04-04 PROCEDURE — 99214 OFFICE O/P EST MOD 30 MIN: CPT | Mod: S$GLB,,, | Performed by: PSYCHOLOGIST

## 2023-04-04 PROCEDURE — 3079F DIAST BP 80-89 MM HG: CPT | Mod: CPTII,S$GLB,, | Performed by: PSYCHOLOGIST

## 2023-04-04 PROCEDURE — 90833 PSYTX W PT W E/M 30 MIN: CPT | Mod: S$GLB,,, | Performed by: PSYCHOLOGIST

## 2023-04-04 PROCEDURE — 3075F PR MOST RECENT SYSTOLIC BLOOD PRESS GE 130-139MM HG: ICD-10-PCS | Mod: CPTII,S$GLB,, | Performed by: PSYCHOLOGIST

## 2023-04-04 PROCEDURE — 99999 PR PBB SHADOW E&M-EST. PATIENT-LVL III: CPT | Mod: PBBFAC,,, | Performed by: PSYCHOLOGIST

## 2023-05-02 ENCOUNTER — OFFICE VISIT (OUTPATIENT)
Dept: PSYCHIATRY | Facility: CLINIC | Age: 33
End: 2023-05-02
Payer: COMMERCIAL

## 2023-05-02 DIAGNOSIS — F43.10 PTSD (POST-TRAUMATIC STRESS DISORDER): Primary | ICD-10-CM

## 2023-05-02 DIAGNOSIS — F33.0 MDD (MAJOR DEPRESSIVE DISORDER), RECURRENT EPISODE, MILD: ICD-10-CM

## 2023-05-02 PROCEDURE — 90791 PSYCH DIAGNOSTIC EVALUATION: CPT | Mod: S$GLB,,, | Performed by: SOCIAL WORKER

## 2023-05-02 PROCEDURE — 90791 PR PSYCHIATRIC DIAGNOSTIC EVALUATION: ICD-10-PCS | Mod: S$GLB,,, | Performed by: SOCIAL WORKER
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
## 2023-05-16 ENCOUNTER — OFFICE VISIT (OUTPATIENT)
Dept: PSYCHIATRY | Facility: CLINIC | Age: 33
End: 2023-05-16
Payer: COMMERCIAL

## 2023-05-16 VITALS
BODY MASS INDEX: 25.76 KG/M2 | WEIGHT: 164.13 LBS | HEIGHT: 67 IN | HEART RATE: 76 BPM | DIASTOLIC BLOOD PRESSURE: 69 MMHG | SYSTOLIC BLOOD PRESSURE: 124 MMHG

## 2023-05-16 DIAGNOSIS — F43.10 PTSD (POST-TRAUMATIC STRESS DISORDER): Primary | ICD-10-CM

## 2023-05-16 PROCEDURE — 99999 PR PBB SHADOW E&M-EST. PATIENT-LVL III: ICD-10-PCS | Mod: PBBFAC,,, | Performed by: PSYCHOLOGIST

## 2023-05-16 PROCEDURE — 90833 PSYTX W PT W E/M 30 MIN: CPT | Mod: S$GLB,,, | Performed by: PSYCHOLOGIST

## 2023-05-16 PROCEDURE — 3078F DIAST BP <80 MM HG: CPT | Mod: CPTII,S$GLB,, | Performed by: PSYCHOLOGIST

## 2023-05-16 PROCEDURE — 99999 PR PBB SHADOW E&M-EST. PATIENT-LVL I: CPT | Mod: PBBFAC,,, | Performed by: SOCIAL WORKER

## 2023-05-16 PROCEDURE — 90834 PSYTX W PT 45 MINUTES: CPT | Mod: S$GLB,,, | Performed by: SOCIAL WORKER

## 2023-05-16 PROCEDURE — 99999 PR PBB SHADOW E&M-EST. PATIENT-LVL III: CPT | Mod: PBBFAC,,, | Performed by: PSYCHOLOGIST

## 2023-05-16 PROCEDURE — 1159F PR MEDICATION LIST DOCUMENTED IN MEDICAL RECORD: ICD-10-PCS | Mod: CPTII,S$GLB,, | Performed by: PSYCHOLOGIST

## 2023-05-16 PROCEDURE — 3074F SYST BP LT 130 MM HG: CPT | Mod: CPTII,S$GLB,, | Performed by: PSYCHOLOGIST

## 2023-05-16 PROCEDURE — 99214 PR OFFICE/OUTPT VISIT, EST, LEVL IV, 30-39 MIN: ICD-10-PCS | Mod: S$GLB,,, | Performed by: PSYCHOLOGIST

## 2023-05-16 PROCEDURE — 3008F BODY MASS INDEX DOCD: CPT | Mod: CPTII,S$GLB,, | Performed by: PSYCHOLOGIST

## 2023-05-16 PROCEDURE — 1159F MED LIST DOCD IN RCRD: CPT | Mod: CPTII,S$GLB,, | Performed by: PSYCHOLOGIST

## 2023-05-16 PROCEDURE — 90834 PR PSYCHOTHERAPY W/PATIENT, 45 MIN: ICD-10-PCS | Mod: S$GLB,,, | Performed by: SOCIAL WORKER

## 2023-05-16 PROCEDURE — 3074F PR MOST RECENT SYSTOLIC BLOOD PRESSURE < 130 MM HG: ICD-10-PCS | Mod: CPTII,S$GLB,, | Performed by: PSYCHOLOGIST

## 2023-05-16 PROCEDURE — 90833 PR PSYCHOTHERAPY W/PATIENT W/E&M, 30 MIN (ADD ON): ICD-10-PCS | Mod: S$GLB,,, | Performed by: PSYCHOLOGIST

## 2023-05-16 PROCEDURE — 99999 PR PBB SHADOW E&M-EST. PATIENT-LVL I: ICD-10-PCS | Mod: PBBFAC,,, | Performed by: SOCIAL WORKER

## 2023-05-16 PROCEDURE — 3078F PR MOST RECENT DIASTOLIC BLOOD PRESSURE < 80 MM HG: ICD-10-PCS | Mod: CPTII,S$GLB,, | Performed by: PSYCHOLOGIST

## 2023-05-16 PROCEDURE — 99214 OFFICE O/P EST MOD 30 MIN: CPT | Mod: S$GLB,,, | Performed by: PSYCHOLOGIST

## 2023-05-16 PROCEDURE — 3008F PR BODY MASS INDEX (BMI) DOCUMENTED: ICD-10-PCS | Mod: CPTII,S$GLB,, | Performed by: PSYCHOLOGIST

## 2023-05-16 RX ORDER — ESCITALOPRAM OXALATE 10 MG/1
10 TABLET ORAL DAILY
Qty: 30 TABLET | Refills: 1 | Status: SHIPPED | OUTPATIENT
Start: 2023-05-16 | End: 2023-06-19 | Stop reason: SDUPTHER

## 2023-05-16 NOTE — PROGRESS NOTES
"Individual Psychotherapy (PhD/LCSW)    5/16/2023    Site:  [facility]         Therapeutic Intervention: Met with patient.  Outpatient - Insight oriented psychotherapy 45 min - CPT code 96837, Outpatient - Behavior modifying psychotherapy 45 min - CPT code 07963, and Outpatient - Supportive psychotherapy 45 min - CPT Code 02148    Chief complaint/reason for encounter: anxiety     Interval history and content of current session:     Felipe Nava  was AAOX4 while in session.   Identified that he is wanting to work on "learning how to relax" last session.   Pt stated that he is needing to work on communication and managing anger/irritability.  Described a specific situation with his gf. Identified that his gf has been giving his food away, going places with friends and this has been causing increased annoyances for pt. After discussing more, it was identified that he is feeling like his emotions are not be considered in his relationship as a whole.  Discussed specific ways/tools and techniques to increase communication skills and decrease ongoing negative communication patterns.    Pt and sw practiced these tools in session.    Will continue to follow.   Pt aware to contact sw for any additional needs that may occur prior to next session.  Treatment plan:  Target symptoms: anxiety , anger  Why chosen therapy is appropriate versus another modality: relevant to diagnosis  Outcome monitoring methods: self-report, observation  Therapeutic intervention type: insight oriented psychotherapy, behavior modifying psychotherapy, supportive psychotherapy    Risk parameters:  Patient reports no suicidal ideation  Patient reports no homicidal ideation  Patient reports no self-injurious behavior  Patient reports no violent behavior    Verbal deficits: None    Patient's response to intervention:  The patient's response to intervention is accepting.  z  Progress toward goals and other mental status changes:  The patient's " progress toward goals is fair .    Diagnosis:   1. PTSD (post-traumatic stress disorder)            Plan:  individual psychotherapy    Return to clinic: 2 weeks    Length of Service (minutes): 45
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
## 2023-05-19 ENCOUNTER — OFFICE VISIT (OUTPATIENT)
Dept: PSYCHIATRY | Facility: CLINIC | Age: 33
End: 2023-05-19
Payer: COMMERCIAL

## 2023-05-19 DIAGNOSIS — F43.10 PTSD (POST-TRAUMATIC STRESS DISORDER): Primary | ICD-10-CM

## 2023-05-19 DIAGNOSIS — F33.0 MDD (MAJOR DEPRESSIVE DISORDER), RECURRENT EPISODE, MILD: ICD-10-CM

## 2023-05-19 PROCEDURE — 90832 PR PSYCHOTHERAPY W/PATIENT, 30 MIN: ICD-10-PCS | Mod: 95,,, | Performed by: PSYCHOLOGIST

## 2023-05-19 PROCEDURE — 1159F MED LIST DOCD IN RCRD: CPT | Mod: CPTII,95,, | Performed by: PSYCHOLOGIST

## 2023-05-19 PROCEDURE — 1159F PR MEDICATION LIST DOCUMENTED IN MEDICAL RECORD: ICD-10-PCS | Mod: CPTII,95,, | Performed by: PSYCHOLOGIST

## 2023-05-19 PROCEDURE — 90832 PSYTX W PT 30 MINUTES: CPT | Mod: 95,,, | Performed by: PSYCHOLOGIST

## 2023-05-23 ENCOUNTER — OFFICE VISIT (OUTPATIENT)
Dept: PSYCHIATRY | Facility: CLINIC | Age: 33
End: 2023-05-23
Payer: COMMERCIAL

## 2023-05-23 DIAGNOSIS — F43.10 PTSD (POST-TRAUMATIC STRESS DISORDER): ICD-10-CM

## 2023-05-23 DIAGNOSIS — F33.0 MDD (MAJOR DEPRESSIVE DISORDER), RECURRENT EPISODE, MILD: Primary | ICD-10-CM

## 2023-05-23 PROCEDURE — 90853 GROUP PSYCHOTHERAPY: CPT | Mod: S$GLB,,, | Performed by: PSYCHOLOGIST

## 2023-05-23 PROCEDURE — 99999 PR PBB SHADOW E&M-EST. PATIENT-LVL II: CPT | Mod: PBBFAC,,, | Performed by: PSYCHOLOGIST

## 2023-05-23 PROCEDURE — 90853 PR GROUP PSYCHOTHERAPY: ICD-10-PCS | Mod: S$GLB,,, | Performed by: PSYCHOLOGIST

## 2023-05-23 PROCEDURE — 1159F MED LIST DOCD IN RCRD: CPT | Mod: CPTII,S$GLB,, | Performed by: PSYCHOLOGIST

## 2023-05-23 PROCEDURE — 1159F PR MEDICATION LIST DOCUMENTED IN MEDICAL RECORD: ICD-10-PCS | Mod: CPTII,S$GLB,, | Performed by: PSYCHOLOGIST

## 2023-05-23 PROCEDURE — 99999 PR PBB SHADOW E&M-EST. PATIENT-LVL II: ICD-10-PCS | Mod: PBBFAC,,, | Performed by: PSYCHOLOGIST

## 2023-05-24 NOTE — PROGRESS NOTES
Individual Psychotherapy/IPT Group Screening (PhD)     DATE: 5/19/23     Site:  [facility]          Therapeutic Intervention: Met with patient.  Outpatient - Supportive psychotherapy 30 min - CPT Code 13340     Chief complaint/reason for encounter: depression, anxiety, and interpersonal        Interval history and content of current session: Pt arrived on time to group therapy screening. Discussed pt's focus and goals in individual therapy. He reported long history of successive traumas without substantial time and space to process trauma. He noted ongoing difficulty trusting other people, which he hopes to improve via group therapy. Pt reported interest in group therapy and stated past groups have been unhelpful due to restricted subject matter of group. Provided psychoeducation about group process and discussed structure of group. Pt stated he is available at group time for 10 weekly sessions. Pt will be contacted week before next group cohort starts.     Treatment plan:  Target symptoms: depression, anxiety  Why chosen therapy is appropriate versus another modality: relevant to diagnosis  Outcome monitoring methods: self-report  Therapeutic intervention type: supportive psychotherapy     Risk parameters:  Patient reports no suicidal ideation  Patient reports no homicidal ideation  Patient reports no self-injurious behavior  Patient reports no violent behavior     Verbal deficits: None     Patient's response to intervention:  The patient's response to intervention is accepting.     Progress toward goals and other mental status changes:  The patient's progress toward goals is fair .     Diagnosis:   PTSD, MDD     Plan:  group psychotherapy     Return to clinic: 2 weeks

## 2023-05-24 NOTE — PROGRESS NOTES
Group Psychotherapy     Site: [facility]     5/23/2023     Length of service: 90 minutes     Referred by: Jose A Beaver, PhD     Number of patients in attendance: 9     Target symptoms: depression, anxiety     Patient's response to intervention:  The patient's response to intervention is good     Progress toward goals and other mental status changes:  The patient's progress toward goals is good     Interval history: Pt arrived on time to initial group session. Group members introduced themselves and shared goals for the group. Revisited group structure, tasks, norms, and expectations. Led group in ice-breaker activity in which members practiced sharing parts of themselves. Members then took turns sharing recent struggles. One group member shared how his father's emotional abuse caused him to feel shame about emoting. Others shared experiences of emotional abuse and invalidation. Another group member shared difficulty with conflict with in-laws, and group provided support.     Diagnosis: Major Depressive Disorder, recurrent, moderate  PTSD    Plan: Continue in group      Return to clinic: next week

## 2023-05-30 ENCOUNTER — OFFICE VISIT (OUTPATIENT)
Dept: PSYCHIATRY | Facility: CLINIC | Age: 33
End: 2023-05-30
Payer: COMMERCIAL

## 2023-05-30 DIAGNOSIS — F33.0 MDD (MAJOR DEPRESSIVE DISORDER), RECURRENT EPISODE, MILD: Primary | ICD-10-CM

## 2023-05-30 DIAGNOSIS — F43.10 PTSD (POST-TRAUMATIC STRESS DISORDER): Primary | ICD-10-CM

## 2023-05-30 DIAGNOSIS — F43.10 PTSD (POST-TRAUMATIC STRESS DISORDER): ICD-10-CM

## 2023-05-30 PROCEDURE — 90853 GROUP PSYCHOTHERAPY: CPT | Mod: S$GLB,,, | Performed by: PSYCHOLOGIST

## 2023-05-30 PROCEDURE — 1159F MED LIST DOCD IN RCRD: CPT | Mod: CPTII,S$GLB,, | Performed by: PSYCHOLOGIST

## 2023-05-30 PROCEDURE — 1159F PR MEDICATION LIST DOCUMENTED IN MEDICAL RECORD: ICD-10-PCS | Mod: CPTII,S$GLB,, | Performed by: PSYCHOLOGIST

## 2023-05-30 PROCEDURE — 99999 PR PBB SHADOW E&M-EST. PATIENT-LVL II: ICD-10-PCS | Mod: PBBFAC,,, | Performed by: PSYCHOLOGIST

## 2023-05-30 PROCEDURE — 99999 PR PBB SHADOW E&M-EST. PATIENT-LVL II: CPT | Mod: PBBFAC,,, | Performed by: PSYCHOLOGIST

## 2023-05-30 PROCEDURE — 90834 PR PSYCHOTHERAPY W/PATIENT, 45 MIN: ICD-10-PCS | Mod: S$GLB,,, | Performed by: SOCIAL WORKER

## 2023-05-30 PROCEDURE — 90853 PR GROUP PSYCHOTHERAPY: ICD-10-PCS | Mod: S$GLB,,, | Performed by: PSYCHOLOGIST

## 2023-05-30 PROCEDURE — 90834 PSYTX W PT 45 MINUTES: CPT | Mod: S$GLB,,, | Performed by: SOCIAL WORKER

## 2023-05-30 NOTE — PROGRESS NOTES
Individual Psychotherapy (PhD/LCSW)    05/30/2023    Interim Events/Subjective Report/Content of Current Session:  follow-up appointment.    Pt is a 32 y.o. male with past psychiatric hx of depression and trauma who presents for follow-up treatment.    Pt stated he had a good week. Reported visiting his girlfriend's parents.   Continuing to work on mindfulness, managing frustration, and communication .   Identified various moments since previous session that he was able to practice communication techniques. Reported also focusing on boundaries which is something that made him uncomfortable in the past.   Spent time discuss boundary setting and ways to increase his mindfulness of mood prior to addressing other's problems.     Pt was in a positive mood. Appears to be focused on boundary setting tools at this time which was identified to be useful to continue to work on .  Denied any significant mood interruptions.    Will continue to follow.   Pt aware to contact sw for any additional needs that may occur prior to next session.    Therapeutic Intervention/Techniques: supportive, insight-oriented, behavior modification ; relevant to diagnosis, patient responds to this modality    Risk Parameters:  Patient reports no suicidal ideation  Patient reports no homicidal ideation  Patient reports no self-injurious behavior  Patient reports no violent behavior    Diagnosis:     Return to Clinic: as scheduled  Counseling time: 45  -Call to report any worsening of symptoms or problems associated with medication.  - Pt instructed to go to ER if thoughts of harming self or others arise.   -Supportive therapy and psychoeducation provided  -Pt instructed to call clinic, 911 or go to nearest emergency room if sxs worsen or pt is in crisis. The pt expresses understanding.   Each patient to whom he or she provides medical services by telemedicine is:  (1) informed of the relationship between the physician and patient and the respective  role of any other health care provider with respect to management of the patient; and (2) notified that he or she may decline to receive medical services by telemedicine and may withdraw from such care at any time.

## 2023-05-31 NOTE — PROGRESS NOTES
Group Psychotherapy     Site: [facility]     5/30/2023     Length of service: 90 minutes     Referred by: Jose A Beaver, PhD     Number of patients in attendance: 5     Target symptoms: depression, anxiety     Patient's response to intervention:  The patient's response to intervention is good     Progress toward goals and other mental status changes:  The patient's progress toward goals is good     nterval history: Pt arrived on time to 2nd group session. One group member started by apologizing for interrupting another member in previous session and thanking another member for correcting her. Discussed pt's difficult experiences with lack of apologies in the past. One group member shared how her mother's abandonment and emotional invalidation led to emotional suppression. Another group member shared her journey of letting go of her mother's mistreatment. Another member shared his progression of traumatic events over his lifetime and difficulty processing these traumas and trusting others as a result.      Diagnosis: Major Depressive Disorder, recurrent, moderate  PTSD    Plan: Continue in group      Return to clinic: next week

## 2023-06-06 ENCOUNTER — OFFICE VISIT (OUTPATIENT)
Dept: PSYCHIATRY | Facility: CLINIC | Age: 33
End: 2023-06-06
Payer: COMMERCIAL

## 2023-06-06 DIAGNOSIS — F43.10 PTSD (POST-TRAUMATIC STRESS DISORDER): Primary | ICD-10-CM

## 2023-06-06 DIAGNOSIS — F33.0 MDD (MAJOR DEPRESSIVE DISORDER), RECURRENT EPISODE, MILD: ICD-10-CM

## 2023-06-06 PROCEDURE — 99999 PR PBB SHADOW E&M-EST. PATIENT-LVL II: CPT | Mod: PBBFAC,,, | Performed by: PSYCHOLOGIST

## 2023-06-06 PROCEDURE — 1159F PR MEDICATION LIST DOCUMENTED IN MEDICAL RECORD: ICD-10-PCS | Mod: CPTII,S$GLB,, | Performed by: PSYCHOLOGIST

## 2023-06-06 PROCEDURE — 90853 PR GROUP PSYCHOTHERAPY: ICD-10-PCS | Mod: S$GLB,,, | Performed by: PSYCHOLOGIST

## 2023-06-06 PROCEDURE — 90853 GROUP PSYCHOTHERAPY: CPT | Mod: S$GLB,,, | Performed by: PSYCHOLOGIST

## 2023-06-06 PROCEDURE — 1159F MED LIST DOCD IN RCRD: CPT | Mod: CPTII,S$GLB,, | Performed by: PSYCHOLOGIST

## 2023-06-06 PROCEDURE — 99999 PR PBB SHADOW E&M-EST. PATIENT-LVL II: ICD-10-PCS | Mod: PBBFAC,,, | Performed by: PSYCHOLOGIST

## 2023-06-08 ENCOUNTER — TELEPHONE (OUTPATIENT)
Dept: PSYCHIATRY | Facility: CLINIC | Age: 33
End: 2023-06-08
Payer: COMMERCIAL

## 2023-06-09 NOTE — PROGRESS NOTES
"Group Psychotherapy     Site: [facility]     2023     Length of service: 90 minutes     Referred by: Jose A Beaver, PhD     Number of patients in attendance: 6     Target symptoms: depression, anxiety     Patient's response to intervention:  The patient's response to intervention is good     Progress toward goals and other mental status changes:  The patient's progress toward goals is good     Interval history: Pt arrived on time to 3rd group session. One group member shared the shame she felt by not attending the final group session from previous cohort. Group shared experiences with difficulty saying goodbye. This member also discussed her feelings of "selfishness" after she discovered her friend  by suicide. Discussed how group therapy is a good context for processing these types of feelings. Another group member shared her hurt in her marriage, and group provided support. Another group member shared his experience with anxiety around people. Discussed difference between social anxiety and trauma-related anxiety. Another group member shared his feelings of fatigue and grief of successive family deaths. Another member shared her realization of the brevity of her life remaining and how she does not want to be remembered by her family, although she is saddened that her granddaughter will never know her. Discussed protective function of "being invisible" as a child.     Diagnosis: Major Depressive Disorder, recurrent, moderate  PTSD    Plan: Continue in group      Return to clinic: next week          "

## 2023-06-16 ENCOUNTER — TELEPHONE (OUTPATIENT)
Dept: PSYCHIATRY | Facility: CLINIC | Age: 33
End: 2023-06-16
Payer: COMMERCIAL

## 2023-06-19 ENCOUNTER — OFFICE VISIT (OUTPATIENT)
Dept: PSYCHIATRY | Facility: CLINIC | Age: 33
End: 2023-06-19
Payer: COMMERCIAL

## 2023-06-19 VITALS
HEIGHT: 67 IN | HEART RATE: 66 BPM | WEIGHT: 173.38 LBS | DIASTOLIC BLOOD PRESSURE: 71 MMHG | SYSTOLIC BLOOD PRESSURE: 117 MMHG | BODY MASS INDEX: 27.21 KG/M2

## 2023-06-19 DIAGNOSIS — F43.10 PTSD (POST-TRAUMATIC STRESS DISORDER): Primary | ICD-10-CM

## 2023-06-19 PROCEDURE — 99999 PR PBB SHADOW E&M-EST. PATIENT-LVL III: ICD-10-PCS | Mod: PBBFAC,,, | Performed by: PSYCHOLOGIST

## 2023-06-19 PROCEDURE — 3008F BODY MASS INDEX DOCD: CPT | Mod: CPTII,S$GLB,, | Performed by: PSYCHOLOGIST

## 2023-06-19 PROCEDURE — 99213 OFFICE O/P EST LOW 20 MIN: CPT | Mod: S$GLB,,, | Performed by: PSYCHOLOGIST

## 2023-06-19 PROCEDURE — 99999 PR PBB SHADOW E&M-EST. PATIENT-LVL III: CPT | Mod: PBBFAC,,, | Performed by: PSYCHOLOGIST

## 2023-06-19 PROCEDURE — 1159F PR MEDICATION LIST DOCUMENTED IN MEDICAL RECORD: ICD-10-PCS | Mod: CPTII,S$GLB,, | Performed by: PSYCHOLOGIST

## 2023-06-19 PROCEDURE — 90833 PR PSYCHOTHERAPY W/PATIENT W/E&M, 30 MIN (ADD ON): ICD-10-PCS | Mod: S$GLB,,, | Performed by: PSYCHOLOGIST

## 2023-06-19 PROCEDURE — 3078F PR MOST RECENT DIASTOLIC BLOOD PRESSURE < 80 MM HG: ICD-10-PCS | Mod: CPTII,S$GLB,, | Performed by: PSYCHOLOGIST

## 2023-06-19 PROCEDURE — 3074F SYST BP LT 130 MM HG: CPT | Mod: CPTII,S$GLB,, | Performed by: PSYCHOLOGIST

## 2023-06-19 PROCEDURE — 1159F MED LIST DOCD IN RCRD: CPT | Mod: CPTII,S$GLB,, | Performed by: PSYCHOLOGIST

## 2023-06-19 PROCEDURE — 99213 PR OFFICE/OUTPT VISIT, EST, LEVL III, 20-29 MIN: ICD-10-PCS | Mod: S$GLB,,, | Performed by: PSYCHOLOGIST

## 2023-06-19 PROCEDURE — 3078F DIAST BP <80 MM HG: CPT | Mod: CPTII,S$GLB,, | Performed by: PSYCHOLOGIST

## 2023-06-19 PROCEDURE — 90833 PSYTX W PT W E/M 30 MIN: CPT | Mod: S$GLB,,, | Performed by: PSYCHOLOGIST

## 2023-06-19 PROCEDURE — 3074F PR MOST RECENT SYSTOLIC BLOOD PRESSURE < 130 MM HG: ICD-10-PCS | Mod: CPTII,S$GLB,, | Performed by: PSYCHOLOGIST

## 2023-06-19 PROCEDURE — 3008F PR BODY MASS INDEX (BMI) DOCUMENTED: ICD-10-PCS | Mod: CPTII,S$GLB,, | Performed by: PSYCHOLOGIST

## 2023-06-19 RX ORDER — ESCITALOPRAM OXALATE 10 MG/1
10 TABLET ORAL DAILY
Qty: 90 TABLET | Refills: 1 | Status: SHIPPED | OUTPATIENT
Start: 2023-06-19 | End: 2023-12-18 | Stop reason: SDUPTHER

## 2023-06-19 NOTE — PROGRESS NOTES
Outpatient Psychiatry Follow-Up Visit    Clinical Status of Patient: Outpatient (Ambulatory)  06/19/2023     Chief Complaint: 32 year old male presenting today for a follow-up.       Interval History and Content of Current Session:  Interim Events/Subjective Report/Content of Current Session:  follow-up appointment.    Pt is a 32 year old male with past psychiatric hx of PTSD who presents for follow-up treatment. Pt is doing much better on Lexapro. Pt said that his mood is good and stable. Pt is no longer having difficulty regulating his temperature nor is he tired in the daytime. Pt is enjoying individual and group therapy and explained that he is going to start going to the gym and got a membership to a [employer] place. Pt denied any other major changes or new stressors since the last session.     Past Psychiatric hx: inpt X 1 in 2017 and prior treatment with Abilify, Trintellix, Wellbutrin, Zoloft (robotic), Buspar (effective), Prazosin (effective); Prozac (poor tolerance)    Past Medical hx:   Past Medical History:   Diagnosis Date    Anxiety     Depression     Tobacco use     Tricuspid insufficiency         Interim hx:  Medication changes last visit:   stopped Prozac and started Lexapro 10mg once daily  Anxiety: mild - improved  Depression: mild - improved     Denies suicidal/homicidal ideations.  Denies hopelessness/worthlessness.    Denies auditory/visual hallucinations      Alcohol: minimal, infrequent  Drug: pt denied  Caffeine: minimal use  Tobacco: pt denied      Review of Systems   PSYCHIATRIC: Pertinent items are noted in the narrative.        CONSTITUTIONAL: weight stable    Past Medical, Family and Social History: The patient's past medical, family and social history have been reviewed and updated as appropriate within the electronic medical record. See encounter notes.     Current Psychiatric Medication:  Lexapro 10mg one daily     Compliance: yes      Side effects: pt denied     Risk  Parameters:  Patient reports no suicidal ideation  Patient reports no homicidal ideation  Patient reports no self-injurious behavior  Patient reports no violent behavior     Exam (detailed: at least 9 elements; comprehensive: all 15 elements)   Constitutional  Vitals:  Most recent vital signs, dated less than 90 days prior to this appointment, were reviewed. Pulse:  [66]   BP: (117)/(71)       General:  unremarkable, age appropriate, casual attire, good eye contact, good rapport       Musculoskeletal  Muscle Strength/Tone:  no flaccidity, no tremor    Gait & Station:  normal      Psychiatric                       Speech:  normal tone, normal rate, rhythm, and volume   Mood & Affect:   Depressed, anxious         Thought Process:   Goal directed; Linear    Associations:   intact   Thought Content:   No SI/HI, delusions, or paranoia, no AV/VH   Insight & Judgement:   Good, adequate to circumstances   Orientation:   grossly intact; alert and oriented x 4    Memory:  intact for content of interview    Language:  grossly intact, can repeat    Attention Span  : Grossly intact for content of interview   Fund of Knowledge:   intact and appropriate to age and level of education        Assessment and Diagnosis   Status/Progress: improving     Impression: Pt struggles with depression and anxiety symptoms in the context of complex trauma. Pt should continue in psychiatric medication management, start counseling, and start a cardio exercise regimen.     Diagnosis: PTSD    Intervention/Counseling/Treatment Plan   Medication Management:      Continue Lexapro 10mg once daily    2. Continue in group therapy and individual therapy with FRANCIA    3. Start a cardio exercise regimen     4. Call to report any worsening of symptoms or problems with the medication. Pt instructed to go to ER with thoughts of harming self, others    Psychotherapy:   Target symptoms: depression, anxiety  Why chosen therapy is appropriate versus another modality:  CBT used; relevant to diagnosis, patient responds to this modality  Outcome monitoring methods: self-report, observation  Therapeutic intervention type: Cognitive Behavioral Therapy  Topics discussed/themes: building skills sets for symptom management, symptom recognition, nutrition, exercise  The patient's response to the intervention is good  Patient's response to treatment is: good.   The patient's progress toward treatment goals: improving  16 minutes spent with pt in psychotherapy and 5 minutes in medication management     Return to clinic: 6 months    -Cognitive-Behavioral/Supportive therapy and psychoeducation provided  -R/B/SE's of medications discussed with the pt who expresses understanding and chooses to take medications as prescribed.   -Pt instructed to call clinic, 911 or go to nearest emergency room if sxs worsen or pt is in   crisis. The pt expresses understanding.    Jose A Beaver, PhD, MP

## 2023-06-20 ENCOUNTER — OFFICE VISIT (OUTPATIENT)
Dept: PSYCHIATRY | Facility: CLINIC | Age: 33
End: 2023-06-20
Payer: COMMERCIAL

## 2023-06-20 DIAGNOSIS — F33.0 MDD (MAJOR DEPRESSIVE DISORDER), RECURRENT EPISODE, MILD: ICD-10-CM

## 2023-06-20 DIAGNOSIS — F43.10 PTSD (POST-TRAUMATIC STRESS DISORDER): Primary | ICD-10-CM

## 2023-06-20 PROCEDURE — 1159F MED LIST DOCD IN RCRD: CPT | Mod: CPTII,S$GLB,, | Performed by: PSYCHOLOGIST

## 2023-06-20 PROCEDURE — 90785 PSYTX COMPLEX INTERACTIVE: CPT | Mod: S$GLB,,, | Performed by: SOCIAL WORKER

## 2023-06-20 PROCEDURE — 90853 PR GROUP PSYCHOTHERAPY: ICD-10-PCS | Mod: S$GLB,,, | Performed by: PSYCHOLOGIST

## 2023-06-20 PROCEDURE — 99999 PR PBB SHADOW E&M-EST. PATIENT-LVL I: CPT | Mod: PBBFAC,,, | Performed by: SOCIAL WORKER

## 2023-06-20 PROCEDURE — 90834 PR PSYCHOTHERAPY W/PATIENT, 45 MIN: ICD-10-PCS | Mod: S$GLB,,, | Performed by: SOCIAL WORKER

## 2023-06-20 PROCEDURE — 99999 PR PBB SHADOW E&M-EST. PATIENT-LVL II: CPT | Mod: PBBFAC,,, | Performed by: PSYCHOLOGIST

## 2023-06-20 PROCEDURE — 99999 PR PBB SHADOW E&M-EST. PATIENT-LVL II: ICD-10-PCS | Mod: PBBFAC,,, | Performed by: PSYCHOLOGIST

## 2023-06-20 PROCEDURE — 90834 PSYTX W PT 45 MINUTES: CPT | Mod: S$GLB,,, | Performed by: SOCIAL WORKER

## 2023-06-20 PROCEDURE — 1159F PR MEDICATION LIST DOCUMENTED IN MEDICAL RECORD: ICD-10-PCS | Mod: CPTII,S$GLB,, | Performed by: PSYCHOLOGIST

## 2023-06-20 PROCEDURE — 90853 GROUP PSYCHOTHERAPY: CPT | Mod: S$GLB,,, | Performed by: PSYCHOLOGIST

## 2023-06-20 PROCEDURE — 90785 PR INTERACTIVE COMPLEXITY: ICD-10-PCS | Mod: S$GLB,,, | Performed by: SOCIAL WORKER

## 2023-06-20 PROCEDURE — 99999 PR PBB SHADOW E&M-EST. PATIENT-LVL I: ICD-10-PCS | Mod: PBBFAC,,, | Performed by: SOCIAL WORKER

## 2023-06-20 NOTE — PROGRESS NOTES
"Group Psychotherapy     Site: [facility]     6/20/2023     Length of service: 90 minutes     Referred by: Jose A Beaver, PhD     Number of patients in attendance: 5     Target symptoms: depression, anxiety     Patient's response to intervention:  The patient's response to intervention is good     Progress toward goals and other mental status changes:  The patient's progress toward goals is good        Interval history: Pt arrived on time to 4th group session. One group member returned for first time since initial group session. He shared how he is no longer "in really dark place," partially as a result of finding employment. Another group member shared feeling of hurt and anger toward sister, and group provided feedback on how to share her feelings. The undersigned shared DEAR MAN communication strategy. Another group member shared how her brother came out as mixon, and her son was later afraid to come out to her as mixon because he falsely assumed she ostracized her brother. Group agreed to start next session with this group member discussing her home life.     Diagnosis: Major Depressive Disorder, recurrent, moderate  PTSD    Plan: Continue in group      Return to clinic: next week            "

## 2023-06-20 NOTE — PROGRESS NOTES
"Individual Psychotherapy (PhD/LCSW)    06/20/2023    Interim Events/Subjective Report/Content of Current Session:  follow-up appointment.    Pt is a 32 y.o. male with past psychiatric hx of depression and trauma who presents for follow-up treatment.    Pt stated he has been doing well overall.  Mood reported to be stable at this time.   Identified having little to work on right now or process because home life has been going well.     Identified more ability to tolerate grievances within day to day.   Continues to experience GI issues. Stated he is wanting to follow up with pcp to assess.     Mood was stable. Did not show signs of emotional interruption.   Self care being practiced by massages weekly or bi weekly.     Spent time during session explaining a situation that he "flashed out" . He was not happy about his reaction. After describing the scenario, it became apparent that he was triggered by bars and places that make pt feel like he is needing to be hyperaware of his surroundings. Processed trigger place in regards to anger reaction and previous trauma.     Will continue to follow.   Pt aware to contact  for any additional needs that may occur prior to next session.    Therapeutic Intervention/Techniques: supportive, insight-oriented, behavior modification ; relevant to diagnosis, patient responds to this modality    Risk Parameters:  Patient reports no suicidal ideation  Patient reports no homicidal ideation  Patient reports no self-injurious behavior  Patient reports no violent behavior    Diagnosis:     Return to Clinic: as scheduled  Counseling time: 45  -Call to report any worsening of symptoms or problems associated with medication.  - Pt instructed to go to ER if thoughts of harming self or others arise.   -Supportive therapy and psychoeducation provided  -Pt instructed to call clinic, 911 or go to nearest emergency room if sxs worsen or pt is in crisis. The pt expresses understanding.   Each " patient to whom he or she provides medical services by telemedicine is:  (1) informed of the relationship between the physician and patient and the respective role of any other health care provider with respect to management of the patient; and (2) notified that he or she may decline to receive medical services by telemedicine and may withdraw from such care at any time.

## 2023-07-11 ENCOUNTER — OFFICE VISIT (OUTPATIENT)
Dept: PSYCHIATRY | Facility: CLINIC | Age: 33
End: 2023-07-11
Payer: COMMERCIAL

## 2023-07-11 DIAGNOSIS — F43.10 PTSD (POST-TRAUMATIC STRESS DISORDER): ICD-10-CM

## 2023-07-11 DIAGNOSIS — F33.0 MDD (MAJOR DEPRESSIVE DISORDER), RECURRENT EPISODE, MILD: Primary | ICD-10-CM

## 2023-07-11 PROCEDURE — 99999 PR PBB SHADOW E&M-EST. PATIENT-LVL II: CPT | Mod: PBBFAC,,, | Performed by: PSYCHOLOGIST

## 2023-07-11 PROCEDURE — 90853 PR GROUP PSYCHOTHERAPY: ICD-10-PCS | Mod: S$GLB,,, | Performed by: PSYCHOLOGIST

## 2023-07-11 PROCEDURE — 1159F MED LIST DOCD IN RCRD: CPT | Mod: CPTII,S$GLB,, | Performed by: PSYCHOLOGIST

## 2023-07-11 PROCEDURE — 1159F PR MEDICATION LIST DOCUMENTED IN MEDICAL RECORD: ICD-10-PCS | Mod: CPTII,S$GLB,, | Performed by: PSYCHOLOGIST

## 2023-07-11 PROCEDURE — 99999 PR PBB SHADOW E&M-EST. PATIENT-LVL II: ICD-10-PCS | Mod: PBBFAC,,, | Performed by: PSYCHOLOGIST

## 2023-07-11 PROCEDURE — 90853 GROUP PSYCHOTHERAPY: CPT | Mod: S$GLB,,, | Performed by: PSYCHOLOGIST

## 2023-07-11 NOTE — PROGRESS NOTES
"Group Psychotherapy     Site: [facility]     2023     Length of service: 90 minutes     Referred by: Jose A Beaver, PhD     Number of patients in attendance: 3     Target symptoms: depression, anxiety     Patient's response to intervention:  The patient's response to intervention is good     Progress toward goals and other mental status changes:  The patient's progress toward goals is good       Interval history: Pt arrived on time to 5th group session. One group member shared that  is the anniversary of his father's death (father  when pt was 12 y/o). Discussed trauma of seeing his father die in the hospital. Other group member's shared their grief experiences of their parents. One group member also shared her guilt about not "doing more" for her younger siblings. Group provided support and discussed how she was preserving herself and her child by setting boundaries with family of origin.     Diagnosis: Major Depressive Disorder, recurrent, moderate  PTSD    Plan: Continue in group      Return to clinic: next week              "

## 2023-07-12 ENCOUNTER — OFFICE VISIT (OUTPATIENT)
Dept: PSYCHIATRY | Facility: CLINIC | Age: 33
End: 2023-07-12
Payer: COMMERCIAL

## 2023-07-12 DIAGNOSIS — F33.0 MDD (MAJOR DEPRESSIVE DISORDER), RECURRENT EPISODE, MILD: Primary | ICD-10-CM

## 2023-07-12 DIAGNOSIS — F43.10 PTSD (POST-TRAUMATIC STRESS DISORDER): ICD-10-CM

## 2023-07-12 PROCEDURE — 90834 PSYTX W PT 45 MINUTES: CPT | Mod: S$GLB,,, | Performed by: SOCIAL WORKER

## 2023-07-12 PROCEDURE — 90785 PR INTERACTIVE COMPLEXITY: ICD-10-PCS | Mod: S$GLB,,, | Performed by: SOCIAL WORKER

## 2023-07-12 PROCEDURE — 90834 PR PSYCHOTHERAPY W/PATIENT, 45 MIN: ICD-10-PCS | Mod: S$GLB,,, | Performed by: SOCIAL WORKER

## 2023-07-12 PROCEDURE — 90785 PSYTX COMPLEX INTERACTIVE: CPT | Mod: S$GLB,,, | Performed by: SOCIAL WORKER

## 2023-07-12 NOTE — PROGRESS NOTES
"Individual Psychotherapy (PhD/LCSW)    07/12/2023    Interim Events/Subjective Report/Content of Current Session:  follow-up appointment.    Pt is a 32 y.o. male with past psychiatric hx of depression and trauma who presents for follow-up treatment.    Pt stated he has been having increased stressors being caused by financial problems and work stress.   Noticing he is more fatigued, low energy, appetite good, sleeping through the night,   Feeling sad per his report towards his girlfriend when it comes to her stress and work issues causing herself despair. Expressed stress and pressure pt feeling responsible for    Spent time discussing his experiences in group sessions. Indicated he was able to share his grief with the fourth of July and that being the anniversary of his dad's death. This was beneficial to share and discuss with other members, but pt also stated he felt that this time because a specific member was not present "taking over the group." Questioning if group work is necessary for therapeutic relief. Identified one specific person interrupting the group. Sw provided suggestions regarding ways to approach  to inform lead of this member causing interruptions. Overall, pt  stated that group is effective for him at times. Continuing to assess effectiveness of participation in group. Pt and sw attributed this to various sessions with these about communication, being heard, and being able to express his emotions at home, work, family, and in relationship. Processed ways to address this goal while also in group.     Mood pleasant and congruent, goal directed, stable in session.    Will continue to follow.   Pt aware to contact sw for any additional needs that may occur prior to next session.    Therapeutic Intervention/Techniques: supportive, insight-oriented, behavior modification ; relevant to diagnosis, patient responds to this modality    Risk Parameters:  Patient reports no suicidal " ideation  Patient reports no homicidal ideation  Patient reports no self-injurious behavior  Patient reports no violent behavior    Diagnosis:   1. MDD (major depressive disorder), recurrent episode, mild        2. PTSD (post-traumatic stress disorder)            Return to Clinic: as scheduled  Counseling time: 45  -Call to report any worsening of symptoms or problems associated with medication.  - Pt instructed to go to ER if thoughts of harming self or others arise.   -Supportive therapy and psychoeducation provided  -Pt instructed to call clinic, 911 or go to nearest emergency room if sxs worsen or pt is in crisis. The pt expresses understanding.   Each patient to whom he or she provides medical services by telemedicine is:  (1) informed of the relationship between the physician and patient and the respective role of any other health care provider with respect to management of the patient; and (2) notified that he or she may decline to receive medical services by telemedicine and may withdraw from such care at any time.

## 2023-07-18 ENCOUNTER — TELEPHONE (OUTPATIENT)
Dept: PSYCHIATRY | Facility: CLINIC | Age: 33
End: 2023-07-18
Payer: COMMERCIAL

## 2023-07-28 ENCOUNTER — PATIENT MESSAGE (OUTPATIENT)
Dept: PSYCHIATRY | Facility: CLINIC | Age: 33
End: 2023-07-28
Payer: COMMERCIAL

## 2023-07-28 ENCOUNTER — OFFICE VISIT (OUTPATIENT)
Dept: PSYCHIATRY | Facility: CLINIC | Age: 33
End: 2023-07-28
Payer: COMMERCIAL

## 2023-07-28 DIAGNOSIS — F43.10 PTSD (POST-TRAUMATIC STRESS DISORDER): ICD-10-CM

## 2023-07-28 DIAGNOSIS — F33.0 MDD (MAJOR DEPRESSIVE DISORDER), RECURRENT EPISODE, MILD: Primary | ICD-10-CM

## 2023-07-28 PROCEDURE — 90834 PSYTX W PT 45 MINUTES: CPT | Mod: S$GLB,,, | Performed by: SOCIAL WORKER

## 2023-07-28 PROCEDURE — 90834 PR PSYCHOTHERAPY W/PATIENT, 45 MIN: ICD-10-PCS | Mod: S$GLB,,, | Performed by: SOCIAL WORKER

## 2023-08-01 ENCOUNTER — OFFICE VISIT (OUTPATIENT)
Dept: FAMILY MEDICINE | Facility: CLINIC | Age: 33
End: 2023-08-01
Payer: COMMERCIAL

## 2023-08-01 VITALS
BODY MASS INDEX: 27.49 KG/M2 | HEART RATE: 78 BPM | HEIGHT: 67 IN | SYSTOLIC BLOOD PRESSURE: 118 MMHG | DIASTOLIC BLOOD PRESSURE: 80 MMHG | WEIGHT: 175.13 LBS

## 2023-08-01 DIAGNOSIS — K29.70 GASTRITIS, PRESENCE OF BLEEDING UNSPECIFIED, UNSPECIFIED CHRONICITY, UNSPECIFIED GASTRITIS TYPE: Primary | ICD-10-CM

## 2023-08-01 PROCEDURE — 3079F DIAST BP 80-89 MM HG: CPT | Mod: CPTII,S$GLB,, | Performed by: FAMILY MEDICINE

## 2023-08-01 PROCEDURE — 3079F PR MOST RECENT DIASTOLIC BLOOD PRESSURE 80-89 MM HG: ICD-10-PCS | Mod: CPTII,S$GLB,, | Performed by: FAMILY MEDICINE

## 2023-08-01 PROCEDURE — 3074F PR MOST RECENT SYSTOLIC BLOOD PRESSURE < 130 MM HG: ICD-10-PCS | Mod: CPTII,S$GLB,, | Performed by: FAMILY MEDICINE

## 2023-08-01 PROCEDURE — 99999 PR PBB SHADOW E&M-EST. PATIENT-LVL III: ICD-10-PCS | Mod: PBBFAC,,, | Performed by: FAMILY MEDICINE

## 2023-08-01 PROCEDURE — 99999 PR PBB SHADOW E&M-EST. PATIENT-LVL III: CPT | Mod: PBBFAC,,, | Performed by: FAMILY MEDICINE

## 2023-08-01 PROCEDURE — 3074F SYST BP LT 130 MM HG: CPT | Mod: CPTII,S$GLB,, | Performed by: FAMILY MEDICINE

## 2023-08-01 PROCEDURE — 3008F PR BODY MASS INDEX (BMI) DOCUMENTED: ICD-10-PCS | Mod: CPTII,S$GLB,, | Performed by: FAMILY MEDICINE

## 2023-08-01 PROCEDURE — 1160F RVW MEDS BY RX/DR IN RCRD: CPT | Mod: CPTII,S$GLB,, | Performed by: FAMILY MEDICINE

## 2023-08-01 PROCEDURE — 99213 OFFICE O/P EST LOW 20 MIN: CPT | Mod: S$GLB,,, | Performed by: FAMILY MEDICINE

## 2023-08-01 PROCEDURE — 1159F MED LIST DOCD IN RCRD: CPT | Mod: CPTII,S$GLB,, | Performed by: FAMILY MEDICINE

## 2023-08-01 PROCEDURE — 1160F PR REVIEW ALL MEDS BY PRESCRIBER/CLIN PHARMACIST DOCUMENTED: ICD-10-PCS | Mod: CPTII,S$GLB,, | Performed by: FAMILY MEDICINE

## 2023-08-01 PROCEDURE — 99213 PR OFFICE/OUTPT VISIT, EST, LEVL III, 20-29 MIN: ICD-10-PCS | Mod: S$GLB,,, | Performed by: FAMILY MEDICINE

## 2023-08-01 PROCEDURE — 3008F BODY MASS INDEX DOCD: CPT | Mod: CPTII,S$GLB,, | Performed by: FAMILY MEDICINE

## 2023-08-01 PROCEDURE — 1159F PR MEDICATION LIST DOCUMENTED IN MEDICAL RECORD: ICD-10-PCS | Mod: CPTII,S$GLB,, | Performed by: FAMILY MEDICINE

## 2023-08-01 RX ORDER — FAMOTIDINE 40 MG/1
40 TABLET, FILM COATED ORAL 2 TIMES DAILY
Qty: 60 TABLET | Refills: 11 | Status: SHIPPED | OUTPATIENT
Start: 2023-08-01 | End: 2023-10-10

## 2023-08-01 RX ORDER — OMEPRAZOLE 40 MG/1
40 CAPSULE, DELAYED RELEASE ORAL EVERY MORNING
Qty: 30 CAPSULE | Refills: 11 | Status: SHIPPED | OUTPATIENT
Start: 2023-08-01 | End: 2023-10-10

## 2023-08-01 NOTE — PATIENT INSTRUCTIONS
As the stomach symptoms improve, slowly back off of the pepcid first, dropping to once daily, and then discontinue fully. You can still use as needed.   If still doing well, then a few weeks later, try to discontinue the prilosec.

## 2023-08-01 NOTE — PROGRESS NOTES
"Subjective:       Patient ID: Felipe Nava is a 32 y.o. male.    Chief Complaint: Gas ("Fowl smelling stool and gas" states he notices foam in his BM, Dizzy/weakness w/ BM's)    HPI  Patient with c/o stomach sx x 2.5 weeks. Relatively consistent since onset regardless of meal types. Prior to onset, he recalls he was placed on lexapro in place of previous prozac. Some increase in heartburn/indigestion sx. Has an acid buildup feeling with spicy burps. Acid sx are noticeable with certain foods, generally 2-3x/week.   Working on intermittent fasting for weight correction.   He's tried otc prilosec and dimitris seltzer - slight improvement - takes them after his sx start or with spicy foods.     Review of Systems:  Review of Systems   Constitutional:  Negative for chills and fever.   Gastrointestinal:  Positive for nausea (after BMs) and rectal pain (noticeable after BMs). Negative for anal bleeding, blood in stool, constipation and diarrhea (loose/softer/mud).        Bowels now moving multiple times/day, prior to current sx they were every 1-2 days.    Neurological:  Positive for dizziness (after some BMs).       Objective:     Vitals:    08/01/23 1123   BP: 118/80   BP Location: Left arm   Pulse: 78   Weight: 79.5 kg (175 lb 2.5 oz)   Height: 5' 7" (1.702 m)          Physical Exam  Vitals and nursing note reviewed.   Constitutional:       General: He is not in acute distress.     Appearance: Normal appearance. He is well-developed.   HENT:      Head: Normocephalic and atraumatic.   Eyes:      General: No scleral icterus.        Right eye: No discharge.         Left eye: No discharge.      Conjunctiva/sclera: Conjunctivae normal.   Cardiovascular:      Rate and Rhythm: Normal rate.   Pulmonary:      Effort: Pulmonary effort is normal. No respiratory distress.   Musculoskeletal:         General: No signs of injury.      Cervical back: Neck supple.      Right lower leg: No edema.      Left lower leg: No edema.   Skin:     " General: Skin is warm and dry.   Neurological:      Mental Status: He is alert and oriented to person, place, and time.      Cranial Nerves: No cranial nerve deficit.   Psychiatric:         Mood and Affect: Mood normal.         Behavior: Behavior normal.           Assessment & Plan:  Gastritis, presence of bleeding unspecified, unspecified chronicity, unspecified gastritis type  -     omeprazole (PRILOSEC) 40 MG capsule; Take 1 capsule (40 mg total) by mouth every morning.  Dispense: 30 capsule; Refill: 11  -     famotidine (PEPCID) 40 MG tablet; Take 1 tablet (40 mg total) by mouth 2 (two) times daily.  Dispense: 60 tablet; Refill: 11    Side effects and precautions of medication use reviewed with patient, expressed understanding. No questions or concerns.

## 2023-08-11 ENCOUNTER — OFFICE VISIT (OUTPATIENT)
Dept: PSYCHIATRY | Facility: CLINIC | Age: 33
End: 2023-08-11
Payer: COMMERCIAL

## 2023-08-11 DIAGNOSIS — F33.0 MDD (MAJOR DEPRESSIVE DISORDER), RECURRENT EPISODE, MILD: Primary | ICD-10-CM

## 2023-08-11 DIAGNOSIS — F43.10 PTSD (POST-TRAUMATIC STRESS DISORDER): ICD-10-CM

## 2023-08-11 PROCEDURE — 90834 PR PSYCHOTHERAPY W/PATIENT, 45 MIN: ICD-10-PCS | Mod: 95,,, | Performed by: SOCIAL WORKER

## 2023-08-11 PROCEDURE — 90834 PSYTX W PT 45 MINUTES: CPT | Mod: 95,,, | Performed by: SOCIAL WORKER

## 2023-08-11 NOTE — PROGRESS NOTES
The patient location is:  [id number] [address]   [facility] [address]  The patient location [place] is: [place]  The patient phone number is: [phone]  Visit type: Virtual visit with synchronous audio and video  Each patient to whom he or she provides medical services by telemedicine is:  (1) informed of the relationship between the physician and patient and the respective role of any other health care provider with respect to management of the patient; and (2) notified that he or she may decline to receive medical services by telemedicine and may withdraw from such care at any time.  Crisis Disclaimer: Patient was informed that due to the virtual nature of the visit, that if a crisis develops, protocols will be implemented to ensure patient safety, including but not limited to: 1) Initiating a welfare check with local Law Enforcement, 2) Calling [facility]/National Crisis Hotline, and/or 3) Initiating PEC/CEC procedures.    Individual Psychotherapy (PhD/LCSW)    08/11/2023    Interim Events/Subjective Report/Content of Current Session:  follow-up appointment.    Pt is a 32 y.o. male with past psychiatric hx of depression and ptsd who presents for follow-up treatment.  Pt stated he has been doing well overall.   Stable sleep regimen, keeping up with exercise routine as well.   Pt spent session discussing a friend he was feeling bad about losing money and him reaching out to pt. Pt stated he spoke to his friend and felt guilty about not being about to assist friend. Pt continued to discuss his own financial struggles in the past and ways he has had to approach asking for money from others.   Pt relationship going well per pt report. Increased communication continues to be practiced.     Processed feelings of guilt during session.   Provided support   Mood was anxious during session. Worry and guilt present.   Will continue to follow.   Pt aware to contact sw for any additional needs that may occur prior to next  session.  Therapeutic Intervention/Techniques: behavior modification, insight oriented, interactive, and supportive; relevant to diagnosis, patient responds to this modality    Risk Parameters:  Patient reports no suicidal ideation  Patient reports no homicidal ideation  Patient reports no self-injurious behavior  Patient reports no violent behavior    Diagnosis:   1. MDD (major depressive disorder), recurrent episode, mild        2. PTSD (post-traumatic stress disorder)              Return to Clinic: as scheduled  Counseling time: 45  -Call to report any worsening of symptoms or problems associated with medication.  - Pt instructed to go to ER if thoughts of harming self or others arise.   -Supportive therapy and psychoeducation provided  -Pt instructed to call clinic, 911 or go to nearest emergency room if sxs worsen or pt is in crisis. The pt expresses understanding.   Each patient to whom he or she provides medical services by telemedicine is:  (1) informed of the relationship between the physician and patient and the respective role of any other health care provider with respect to management of the patient; and (2) notified that he or she may decline to receive medical services by telemedicine and may withdraw from such care at any time.

## 2023-08-25 ENCOUNTER — OFFICE VISIT (OUTPATIENT)
Dept: PSYCHIATRY | Facility: CLINIC | Age: 33
End: 2023-08-25
Payer: COMMERCIAL

## 2023-08-25 DIAGNOSIS — F43.10 PTSD (POST-TRAUMATIC STRESS DISORDER): ICD-10-CM

## 2023-08-25 DIAGNOSIS — F33.0 MDD (MAJOR DEPRESSIVE DISORDER), RECURRENT EPISODE, MILD: Primary | ICD-10-CM

## 2023-08-25 PROCEDURE — 90834 PR PSYCHOTHERAPY W/PATIENT, 45 MIN: ICD-10-PCS | Mod: 95,,, | Performed by: SOCIAL WORKER

## 2023-08-25 PROCEDURE — 90834 PSYTX W PT 45 MINUTES: CPT | Mod: 95,,, | Performed by: SOCIAL WORKER

## 2023-08-25 NOTE — PROGRESS NOTES
"The patient location is:  [id number] [address]   [facility] [address]  The patient location [place] is: [place]  The patient phone number is: [phone]  Visit type: Virtual visit with synchronous audio and video  Each patient to whom he or she provides medical services by telemedicine is:  (1) informed of the relationship between the physician and patient and the respective role of any other health care provider with respect to management of the patient; and (2) notified that he or she may decline to receive medical services by telemedicine and may withdraw from such care at any time.  Crisis Disclaimer: Patient was informed that due to the virtual nature of the visit, that if a crisis develops, protocols will be implemented to ensure patient safety, including but not limited to: 1) Initiating a welfare check with local Law Enforcement, 2) Calling 1/National Crisis Hotline, and/or 3) Initiating PEC/CEC procedures.    Individual Psychotherapy (PhD/LCSW)    08/25/2023    Interim Events/Subjective Report/Content of Current Session:  follow-up appointment.    Pt is a 32 y.o. male with past psychiatric hx of mdd and ptsd who presents for follow-up treatment.  Past two weeks have been "kind of hard" per pt. Stated he hurt his foot working out. Work going well per pt. Pt stated his gf is considering joining the . He is also now considering this. Not wanting to make this decision. Concerned that this will be the end of their relationship. Pt spent session processing his feelings about the  and potential decision of ending his 3 yr relationship. Pt identifeid wanting to  his gf . She does not want to be  in general and is aware that this would also not allow them to be together in the  as they would not be able to fraternize or live together if not . Provided support and validation.     Mood appears to be somewhat blunted. Appears he is not wanting to express emotions. " "Confirmed this speculation as well as pt stated "I will have a cry about things after our session is over but it is what it is."     Coping skills discussed. Pt continues to bring up his health and fitness as a main importance in his life and coping.     Will continue to follow.   Pt aware to contact sw for any additional needs that may occur prior to next session.  Therapeutic Intervention/Techniques: insight oriented, interactive, and supportive; relevant to diagnosis, patient responds to this modality    Risk Parameters:  Patient reports no suicidal ideation  Patient reports no homicidal ideation  Patient reports no self-injurious behavior  Patient reports no violent behavior    Diagnosis:   1. MDD (major depressive disorder), recurrent episode, mild        2. PTSD (post-traumatic stress disorder)              Return to Clinic: as scheduled  Counseling time: 45  -Call to report any worsening of symptoms or problems associated with medication.  - Pt instructed to go to ER if thoughts of harming self or others arise.   -Supportive therapy and psychoeducation provided  -Pt instructed to call clinic, 911 or go to nearest emergency room if sxs worsen or pt is in crisis. The pt expresses understanding.   Each patient to whom he or she provides medical services by telemedicine is:  (1) informed of the relationship between the physician and patient and the respective role of any other health care provider with respect to management of the patient; and (2) notified that he or she may decline to receive medical services by telemedicine and may withdraw from such care at any time.    "

## 2023-10-08 DIAGNOSIS — K29.70 GASTRITIS, PRESENCE OF BLEEDING UNSPECIFIED, UNSPECIFIED CHRONICITY, UNSPECIFIED GASTRITIS TYPE: ICD-10-CM

## 2023-10-08 NOTE — TELEPHONE ENCOUNTER
No care due was identified.  [facility] Embedded Care Due Messages. Reference number: [id number].   10/08/2023 12:45:42 PM CDT

## 2023-10-08 NOTE — TELEPHONE ENCOUNTER
Refill Routing Note   Medication(s) are not appropriate for processing by [facility] for the following reason(s):      New or recently adjusted medication    [facility] action(s):  Defer Care Due:  None identified            Appointments  past 12m or future 3m with PCP    Date Provider   Last Visit   8/1/2023 Gladys Rae MD   Next Visit   Visit date not found Gladys Rae MD   ED visits in past 90 days: 0        Note composed:4:19 PM 10/08/2023

## 2023-10-10 RX ORDER — OMEPRAZOLE 40 MG/1
40 CAPSULE, DELAYED RELEASE ORAL EVERY MORNING
Qty: 90 CAPSULE | Refills: 1 | Status: SHIPPED | OUTPATIENT
Start: 2023-10-10

## 2023-10-10 RX ORDER — FAMOTIDINE 40 MG/1
40 TABLET, FILM COATED ORAL 2 TIMES DAILY
Qty: 180 TABLET | Refills: 1 | Status: SHIPPED | OUTPATIENT
Start: 2023-10-10

## 2023-12-07 ENCOUNTER — PATIENT MESSAGE (OUTPATIENT)
Dept: PSYCHIATRY | Facility: CLINIC | Age: 33
End: 2023-12-07

## 2023-12-07 ENCOUNTER — OFFICE VISIT (OUTPATIENT)
Dept: PSYCHIATRY | Facility: CLINIC | Age: 33
End: 2023-12-07
Payer: COMMERCIAL

## 2023-12-07 DIAGNOSIS — F41.8 ANXIETY WITH DEPRESSION: ICD-10-CM

## 2023-12-07 DIAGNOSIS — F33.0 MDD (MAJOR DEPRESSIVE DISORDER), RECURRENT EPISODE, MILD: Primary | ICD-10-CM

## 2023-12-07 DIAGNOSIS — F43.10 PTSD (POST-TRAUMATIC STRESS DISORDER): ICD-10-CM

## 2023-12-07 PROCEDURE — 90834 PSYTX W PT 45 MINUTES: CPT | Mod: 95,,, | Performed by: SOCIAL WORKER

## 2023-12-07 PROCEDURE — 90834 PR PSYCHOTHERAPY W/PATIENT, 45 MIN: ICD-10-PCS | Mod: 95,,, | Performed by: SOCIAL WORKER

## 2023-12-07 NOTE — PROGRESS NOTES
"The patient location is:  [id number] [address]   [facility] [address]  The patient location [place] is: [address] [address]  The patient phone number is: [phone]  Visit type: Virtual visit with synchronous audio and video  Each patient to whom he or she provides medical services by telemedicine is:  (1) informed of the relationship between the physician and patient and the respective role of any other health care provider with respect to management of the patient; and (2) notified that he or she may decline to receive medical services by telemedicine and may withdraw from such care at any time.  Crisis Disclaimer: Patient was informed that due to the virtual nature of the visit, that if a crisis develops, protocols will be implemented to ensure patient safety, including but not limited to: 1) Initiating a welfare check with local Law Enforcement, 2) Calling 1/National Crisis Hotline, and/or 3) Initiating PEC/CEC procedures.    Individual Psychotherapy (PhD/LCSW)    12/07/2023    Interim Events/Subjective Report/Content of Current Session:  follow-up appointment.    Pt is a 32 y.o. male with past psychiatric hx of mdd and ptsd who presents for follow-up treatment.  Pt stated his gf broke up with him in November. Stated "everything has gone to shit." Pt provided story of his break up . Reason appeared that gf needed to be single per pt. Pt stated he feels depressed. Not sleeping , minimal appetite, low energy, and crying spells.   Sw provided support. Grief present. Sw encouraged pt to discuss feelings of grief. Identified waves of grief present. Provided normalization regarding grief reaction.    Pt stated he is no longer taking lexapro. Stopped due to no getting refill. Last dose was 11/18/23  Pacing throughout session to "get his steps in" . Crying intermittently.  Pt denies any current SI/HI. Denies any current A/VH.   Plan of action to continue to process grief reaction.     Will continue to follow.   Pt aware to " contact sw for any additional needs that may occur prior to next session.  Therapeutic Intervention/Techniques: insight oriented, interactive, and supportive; relevant to diagnosis, patient responds to this modality    Risk Parameters:  Patient reports no suicidal ideation  Patient reports no homicidal ideation  Patient reports no self-injurious behavior  Patient reports no violent behavior    Diagnosis:   1. MDD (major depressive disorder), recurrent episode, mild        2. PTSD (post-traumatic stress disorder)        3. Anxiety with depression                Return to Clinic: as scheduled  Counseling time: 45  -Call to report any worsening of symptoms or problems associated with medication.  - Pt instructed to go to ER if thoughts of harming self or others arise.   -Supportive therapy and psychoeducation provided  -Pt instructed to call clinic, 911 or go to nearest emergency room if sxs worsen or pt is in crisis. The pt expresses understanding.   Each patient to whom he or she provides medical services by telemedicine is:  (1) informed of the relationship between the physician and patient and the respective role of any other health care provider with respect to management of the patient; and (2) notified that he or she may decline to receive medical services by telemedicine and may withdraw from such care at any time.

## 2023-12-13 ENCOUNTER — OFFICE VISIT (OUTPATIENT)
Dept: PSYCHIATRY | Facility: CLINIC | Age: 33
End: 2023-12-13
Payer: COMMERCIAL

## 2023-12-13 DIAGNOSIS — F43.10 PTSD (POST-TRAUMATIC STRESS DISORDER): ICD-10-CM

## 2023-12-13 DIAGNOSIS — F33.0 MDD (MAJOR DEPRESSIVE DISORDER), RECURRENT EPISODE, MILD: Primary | ICD-10-CM

## 2023-12-13 PROCEDURE — 90834 PR PSYCHOTHERAPY W/PATIENT, 45 MIN: ICD-10-PCS | Mod: 95,,, | Performed by: SOCIAL WORKER

## 2023-12-13 PROCEDURE — 90834 PSYTX W PT 45 MINUTES: CPT | Mod: 95,,, | Performed by: SOCIAL WORKER

## 2023-12-13 NOTE — PROGRESS NOTES
"The patient location is:  [id number] [address]   [facility] [address]  The patient location [place] is: [address] [address]  The patient phone number is: [phone]  Visit type: Virtual visit with synchronous audio and video  Each patient to whom he or she provides medical services by telemedicine is:  (1) informed of the relationship between the physician and patient and the respective role of any other health care provider with respect to management of the patient; and (2) notified that he or she may decline to receive medical services by telemedicine and may withdraw from such care at any time.  Crisis Disclaimer: Patient was informed that due to the virtual nature of the visit, that if a crisis develops, protocols will be implemented to ensure patient safety, including but not limited to: 1) Initiating a welfare check with local Law Enforcement, 2) Calling 1/National Crisis Hotline, and/or 3) Initiating PEC/CEC procedures.    Individual Psychotherapy (PhD/LCSW)    12/13/2023    Interim Events/Subjective Report/Content of Current Session:  follow-up appointment.    Pt is a 33 y.o. male with past psychiatric hx of mdd and ptsd who presents for follow-up treatment.  Pt spoke of recent break up. Birthday was yesterday. Stated his ex came over which was " a little awkward."   Pt reviewed the past week. Having awareness of needing to have support system present which is difficult due to various dynamics.   Pt utilized session to process his emotions.     Pt pacing around the home during call. Appears to be I same robe he was in last session. Not sleeping well. Difficulty falling asleep.   Mood was positive. Congruent to situation. Showing confusion with how the relationship with his ex is currently.Grief reaction present and appropriate. Does not show severe depression symptoms at this time nor did pt report any.    Will continue to follow.   Pt aware to contact sw for any additional needs that may occur prior to next " session.  Therapeutic Intervention/Techniques: insight oriented, interactive, and supportive; relevant to diagnosis, patient responds to this modality    Risk Parameters:  Patient reports no suicidal ideation  Patient reports no homicidal ideation  Patient reports no self-injurious behavior  Patient reports no violent behavior    Diagnosis:   1. MDD (major depressive disorder), recurrent episode, mild        2. PTSD (post-traumatic stress disorder)                  Return to Clinic: as scheduled  Counseling time: 45  -Call to report any worsening of symptoms or problems associated with medication.  - Pt instructed to go to ER if thoughts of harming self or others arise.   -Supportive therapy and psychoeducation provided  -Pt instructed to call clinic, 911 or go to nearest emergency room if sxs worsen or pt is in crisis. The pt expresses understanding.   Each patient to whom he or she provides medical services by telemedicine is:  (1) informed of the relationship between the physician and patient and the respective role of any other health care provider with respect to management of the patient; and (2) notified that he or she may decline to receive medical services by telemedicine and may withdraw from such care at any time.

## 2023-12-18 ENCOUNTER — OFFICE VISIT (OUTPATIENT)
Dept: PSYCHIATRY | Facility: CLINIC | Age: 33
End: 2023-12-18
Payer: COMMERCIAL

## 2023-12-18 VITALS
BODY MASS INDEX: 29.22 KG/M2 | SYSTOLIC BLOOD PRESSURE: 117 MMHG | HEART RATE: 64 BPM | DIASTOLIC BLOOD PRESSURE: 76 MMHG | HEIGHT: 67 IN | WEIGHT: 186.19 LBS

## 2023-12-18 DIAGNOSIS — F43.10 PTSD (POST-TRAUMATIC STRESS DISORDER): Primary | ICD-10-CM

## 2023-12-18 PROCEDURE — 90833 PR PSYCHOTHERAPY W/PATIENT W/E&M, 30 MIN (ADD ON): ICD-10-PCS | Mod: S$GLB,,, | Performed by: PSYCHOLOGIST

## 2023-12-18 PROCEDURE — 3008F PR BODY MASS INDEX (BMI) DOCUMENTED: ICD-10-PCS | Mod: CPTII,S$GLB,, | Performed by: PSYCHOLOGIST

## 2023-12-18 PROCEDURE — 1159F PR MEDICATION LIST DOCUMENTED IN MEDICAL RECORD: ICD-10-PCS | Mod: CPTII,S$GLB,, | Performed by: PSYCHOLOGIST

## 2023-12-18 PROCEDURE — 3008F BODY MASS INDEX DOCD: CPT | Mod: CPTII,S$GLB,, | Performed by: PSYCHOLOGIST

## 2023-12-18 PROCEDURE — 3074F PR MOST RECENT SYSTOLIC BLOOD PRESSURE < 130 MM HG: ICD-10-PCS | Mod: CPTII,S$GLB,, | Performed by: PSYCHOLOGIST

## 2023-12-18 PROCEDURE — 99214 PR OFFICE/OUTPT VISIT, EST, LEVL IV, 30-39 MIN: ICD-10-PCS | Mod: S$GLB,,, | Performed by: PSYCHOLOGIST

## 2023-12-18 PROCEDURE — 3078F DIAST BP <80 MM HG: CPT | Mod: CPTII,S$GLB,, | Performed by: PSYCHOLOGIST

## 2023-12-18 PROCEDURE — 3078F PR MOST RECENT DIASTOLIC BLOOD PRESSURE < 80 MM HG: ICD-10-PCS | Mod: CPTII,S$GLB,, | Performed by: PSYCHOLOGIST

## 2023-12-18 PROCEDURE — 1159F MED LIST DOCD IN RCRD: CPT | Mod: CPTII,S$GLB,, | Performed by: PSYCHOLOGIST

## 2023-12-18 PROCEDURE — 99999 PR PBB SHADOW E&M-EST. PATIENT-LVL III: ICD-10-PCS | Mod: PBBFAC,,, | Performed by: PSYCHOLOGIST

## 2023-12-18 PROCEDURE — 99214 OFFICE O/P EST MOD 30 MIN: CPT | Mod: S$GLB,,, | Performed by: PSYCHOLOGIST

## 2023-12-18 PROCEDURE — 3074F SYST BP LT 130 MM HG: CPT | Mod: CPTII,S$GLB,, | Performed by: PSYCHOLOGIST

## 2023-12-18 PROCEDURE — 99999 PR PBB SHADOW E&M-EST. PATIENT-LVL III: CPT | Mod: PBBFAC,,, | Performed by: PSYCHOLOGIST

## 2023-12-18 PROCEDURE — 90833 PSYTX W PT W E/M 30 MIN: CPT | Mod: S$GLB,,, | Performed by: PSYCHOLOGIST

## 2023-12-18 RX ORDER — ESCITALOPRAM OXALATE 10 MG/1
10 TABLET ORAL DAILY
Qty: 90 TABLET | Refills: 1 | Status: SHIPPED | OUTPATIENT
Start: 2023-12-18 | End: 2024-02-19

## 2023-12-18 NOTE — PROGRESS NOTES
Outpatient Psychiatry Follow-Up Visit    Clinical Status of Patient: Outpatient (Ambulatory)  12/18/2023     Chief Complaint: 33 year old male presenting today for a follow-up.       Interval History and Content of Current Session:  Interim Events/Subjective Report/Content of Current Session:  follow-up appointment.    Pt is a 33 year old male with past psychiatric hx of PTSD who presents for follow-up treatment. Pt was doing better but a month ago his girlfriend broke up with him out of no where and moved out. They are still texting and seeing each other regularly which I discouraged. Pt is crying multiple times per day and decided to stop his medicine after the break-up. Pt willing to restart now. Pt denied any other major changes or new stressors since the last session.     Past Psychiatric hx: inpt X 1 in 2017 and prior treatment with Abilify, Trintellix, Wellbutrin, Zoloft (robotic), Buspar (effective), Prazosin (effective); Prozac (poor tolerance)    Past Medical hx:   Past Medical History:   Diagnosis Date    Anxiety     Depression     Tobacco use     Tricuspid insufficiency         Interim hx:  Medication changes last visit:   pt stopped his medication  Anxiety: mild - unchanged  Depression: moderate - increased     Denies suicidal/homicidal ideations.  Denies hopelessness/worthlessness.    Denies auditory/visual hallucinations      Alcohol: minimal, infrequent  Drug: pt denied  Caffeine: minimal use  Tobacco: pt denied      Review of Systems   PSYCHIATRIC: Pertinent items are noted in the narrative.        CONSTITUTIONAL: weight stable    Past Medical, Family and Social History: The patient's past medical, family and social history have been reviewed and updated as appropriate within the electronic medical record. See encounter notes.     Current Psychiatric Medication:  Lexapro 10mg one daily     Compliance: no     Side effects: n/a     Risk Parameters:  Patient reports no suicidal ideation  Patient  reports no homicidal ideation  Patient reports no self-injurious behavior  Patient reports no violent behavior     Exam (detailed: at least 9 elements; comprehensive: all 15 elements)   Constitutional  Vitals:  Most recent vital signs, dated less than 90 days prior to this appointment, were reviewed. Pulse:  [64]   BP: (117)/(76)       General:  unremarkable, age appropriate, casual attire, good eye contact, good rapport       Musculoskeletal  Muscle Strength/Tone:  no flaccidity, no tremor    Gait & Station:  normal      Psychiatric                       Speech:  normal tone, normal rate, rhythm, and volume   Mood & Affect:   Depressed, anxious         Thought Process:   Goal directed; Linear    Associations:   intact   Thought Content:   No SI/HI, delusions, or paranoia, no AV/VH   Insight & Judgement:   Good, adequate to circumstances   Orientation:   grossly intact; alert and oriented x 4    Memory:  intact for content of interview    Language:  grossly intact, can repeat    Attention Span  : Grossly intact for content of interview   Fund of Knowledge:   intact and appropriate to age and level of education        Assessment and Diagnosis   Status/Progress: increasing distress     Impression: Pt struggles with depression and anxiety symptoms in the context of complex trauma. Pt should continue in psychiatric medication management, start counseling, and start a cardio exercise regimen.     Diagnosis: PTSD    Intervention/Counseling/Treatment Plan   Medication Management:      Restart Lexapro 10mg once daily    2. Continue therapy with FRANCIA    3. Start a cardio exercise regimen     4. Call to report any worsening of symptoms or problems with the medication. Pt instructed to go to ER with thoughts of harming self, others    Psychotherapy:   Target symptoms: depression, anxiety  Why chosen therapy is appropriate versus another modality: CBT used; relevant to diagnosis, patient responds to this modality  Outcome  monitoring methods: self-report, observation  Therapeutic intervention type: Cognitive Behavioral Therapy  Topics discussed/themes: building skills sets for symptom management, symptom recognition, nutrition, exercise  The patient's response to the intervention is good  Patient's response to treatment is: good.   The patient's progress toward treatment goals: increasing distress  16 minutes spent with pt in psychotherapy and 5 minutes in medication management     Return to clinic: 2 months    -Cognitive-Behavioral/Supportive therapy and psychoeducation provided  -R/B/SE's of medications discussed with the pt who expresses understanding and chooses to take medications as prescribed.   -Pt instructed to call clinic, 911 or go to nearest emergency room if sxs worsen or pt is in   crisis. The pt expresses understanding.    Jose A Beaver, PhD, MP

## 2023-12-21 ENCOUNTER — OFFICE VISIT (OUTPATIENT)
Dept: PSYCHIATRY | Facility: CLINIC | Age: 33
End: 2023-12-21
Payer: COMMERCIAL

## 2023-12-21 DIAGNOSIS — F33.0 MDD (MAJOR DEPRESSIVE DISORDER), RECURRENT EPISODE, MILD: Primary | ICD-10-CM

## 2023-12-21 DIAGNOSIS — F43.10 PTSD (POST-TRAUMATIC STRESS DISORDER): ICD-10-CM

## 2023-12-21 DIAGNOSIS — F41.8 ANXIETY WITH DEPRESSION: ICD-10-CM

## 2023-12-21 PROCEDURE — 90834 PSYTX W PT 45 MINUTES: CPT | Mod: 95,,, | Performed by: SOCIAL WORKER

## 2023-12-21 PROCEDURE — 90834 PR PSYCHOTHERAPY W/PATIENT, 45 MIN: ICD-10-PCS | Mod: 95,,, | Performed by: SOCIAL WORKER

## 2023-12-21 NOTE — PROGRESS NOTES
"The patient location is:  [id number] [address]   [facility] [address]  The patient location [place] is: [address]  The patient phone number is: [phone]  Visit type: Virtual visit with synchronous audio and video  Each patient to whom he or she provides medical services by telemedicine is:  (1) informed of the relationship between the physician and patient and the respective role of any other health care provider with respect to management of the patient; and (2) notified that he or she may decline to receive medical services by telemedicine and may withdraw from such care at any time.  Crisis Disclaimer: Patient was informed that due to the virtual nature of the visit, that if a crisis develops, protocols will be implemented to ensure patient safety, including but not limited to: 1) Initiating a welfare check with local Law Enforcement, 2) Calling 1/National Crisis Hotline, and/or 3) Initiating PEC/CEC procedures.    Individual Psychotherapy (PhD/LCSW)    12/21/2023    Interim Events/Subjective Report/Content of Current Session:  follow-up appointment.    Pt is a 33 y.o. male with past psychiatric hx of mdd and ptsd who presents for follow-up treatment.  Pt spoke of recent appointment with prescriber. "Indifferent " to beginning meds again.     Mood reported to be depressed per pt. Identified depression being 8/10. Difficult "shutting it off" . "It" being break up. Pt stated he is sleeping and fatigued. Identified that this is a normal reaction to loss. Aware of need for boundary as break/break up is not defined. Discussed feelings unawareness increasing confusion and frustration for pt. Pt aware of unhealthy relationship at this time per his report. Working on identifying ways to stay in a contemplation phase of change. Stages of change model provided.     Mood was depressed, confusion observed as to what to think or feel regarding situation with relationship. Anxiety present as well due to unknown future. "     Coping skills including journaling letters and focusing on health and wellness.     Will continue to follow.     Pt aware to contact sw for any additional needs that may occur prior to next session.  Therapeutic Intervention/Techniques: insight oriented, interactive, and supportive; relevant to diagnosis, patient responds to this modality    Risk Parameters:  Patient reports no suicidal ideation  Patient reports no homicidal ideation  Patient reports no self-injurious behavior  Patient reports no violent behavior    Diagnosis:   No diagnosis found.            Return to Clinic: as scheduled  Counseling time: 45  -Call to report any worsening of symptoms or problems associated with medication.  - Pt instructed to go to ER if thoughts of harming self or others arise.   -Supportive therapy and psychoeducation provided  -Pt instructed to call clinic, 911 or go to nearest emergency room if sxs worsen or pt is in crisis. The pt expresses understanding.   Each patient to whom he or she provides medical services by telemedicine is:  (1) informed of the relationship between the physician and patient and the respective role of any other health care provider with respect to management of the patient; and (2) notified that he or she may decline to receive medical services by telemedicine and may withdraw from such care at any time.

## 2023-12-22 ENCOUNTER — PATIENT MESSAGE (OUTPATIENT)
Dept: PSYCHIATRY | Facility: CLINIC | Age: 33
End: 2023-12-22
Payer: COMMERCIAL

## 2024-01-04 ENCOUNTER — CLINICAL SUPPORT (OUTPATIENT)
Dept: PSYCHIATRY | Facility: CLINIC | Age: 34
End: 2024-01-04
Payer: COMMERCIAL

## 2024-01-04 ENCOUNTER — PATIENT MESSAGE (OUTPATIENT)
Dept: PSYCHIATRY | Facility: CLINIC | Age: 34
End: 2024-01-04
Payer: COMMERCIAL

## 2024-01-04 DIAGNOSIS — Z71.9 ENCOUNTER FOR EDUCATION: Primary | ICD-10-CM

## 2024-01-04 PROCEDURE — 99499 UNLISTED E&M SERVICE: CPT | Mod: S$GLB,,, | Performed by: PSYCHOLOGIST

## 2024-01-04 PROCEDURE — 99999 PR PBB SHADOW E&M-EST. PATIENT-LVL I: CPT | Mod: PBBFAC,,,

## 2024-01-04 NOTE — PROGRESS NOTES
Site: Telemed in [place]     Date: 1/4/2024    Course Focus: Introduction and ARC model     Length of service: 40 minutes    Number of participants in attendance: 4    Referred by: self-referred     Target symptoms: Depression     Participant response: Active Listening    Progress toward goals: Progressing adequately     Interval History: Discussion of session #1: Introduction to emotions (with an emphasis on depression), ARC Model and CBT basics, and the cycle of depression.Participants were given the homework of tracking emotional triggers, responses, and consequences.      Plan: Continue Depression Psychoeducational Course. Next meeting 2nd Thursday of the month.

## 2024-01-11 ENCOUNTER — CLINICAL SUPPORT (OUTPATIENT)
Dept: PSYCHIATRY | Facility: CLINIC | Age: 34
End: 2024-01-11
Payer: COMMERCIAL

## 2024-01-11 DIAGNOSIS — Z71.9 ENCOUNTER FOR EDUCATION: Primary | ICD-10-CM

## 2024-01-11 PROCEDURE — 99499 UNLISTED E&M SERVICE: CPT | Mod: S$GLB,,, | Performed by: PSYCHOLOGIST

## 2024-01-11 NOTE — PROGRESS NOTES
Site: Telemed in [place]     Date: 1/11/2024    Course Focus: Cognitive Coping     Length of service: 60 minutes    Number of participants in attendance: 4    Referred by: self-referred     Target symptoms: Depression     Participant response: Active Listening and Self Disclosure     Progress toward goals: Progressing adequately     Interval History: Discussion of session #2: Why are thoughts important, Automatic thoughts, thinking traps, and challenging questions for cognitive flexibility. Participants were given the homework of generating other ways of thinking about emotional situations.     Plan: Continue Depression Psychoeducational Course. Next meeting 3rd Thursday of the month.

## 2024-01-18 ENCOUNTER — CLINICAL SUPPORT (OUTPATIENT)
Dept: PSYCHIATRY | Facility: CLINIC | Age: 34
End: 2024-01-18
Payer: COMMERCIAL

## 2024-01-18 ENCOUNTER — OFFICE VISIT (OUTPATIENT)
Dept: PSYCHIATRY | Facility: CLINIC | Age: 34
End: 2024-01-18
Payer: COMMERCIAL

## 2024-01-18 DIAGNOSIS — F41.1 GAD (GENERALIZED ANXIETY DISORDER): ICD-10-CM

## 2024-01-18 DIAGNOSIS — F43.10 PTSD (POST-TRAUMATIC STRESS DISORDER): ICD-10-CM

## 2024-01-18 DIAGNOSIS — F33.0 MDD (MAJOR DEPRESSIVE DISORDER), RECURRENT EPISODE, MILD: Primary | ICD-10-CM

## 2024-01-18 DIAGNOSIS — Z71.9 ENCOUNTER FOR EDUCATION: Primary | ICD-10-CM

## 2024-01-18 PROCEDURE — 99499 UNLISTED E&M SERVICE: CPT | Mod: S$GLB,,, | Performed by: PSYCHOLOGIST

## 2024-01-18 PROCEDURE — 90834 PSYTX W PT 45 MINUTES: CPT | Mod: 95,,, | Performed by: SOCIAL WORKER

## 2024-01-18 NOTE — PROGRESS NOTES
The patient location is:  [id number] [address] [facility] [address]  The patient location [place] is: [place]  The patient phone number is: [phone]  Visit type: Virtual visit with synchronous audio and video  Each patient to whom he or she provides medical services by telemedicine is:  (1) informed of the relationship between the physician and patient and the respective role of any other health care provider with respect to management of the patient; and (2) notified that he or she may decline to receive medical services by telemedicine and may withdraw from such care at any time.  Crisis Disclaimer: Patient was informed that due to the virtual nature of the visit, that if a crisis develops, protocols will be implemented to ensure patient safety, including but not limited to: 1) Initiating a welfare check with local Law Enforcement, 2) Calling 1/National Crisis Hotline, and/or 3) Initiating PEC/CEC procedures.    Individual Psychotherapy (PhD/LCSW)    01/18/2024    Interim Events/Subjective Report/Content of Current Session:  follow-up appointment.    Pt is a 33 y.o. male with past psychiatric hx of mdd and ptsd who presents for follow-up treatment.  Pt stated he is continuing to experience depression. Appetite decreased. Has had days without eating. Reported also having difficulty with hygiene and self care. Pt spent time in session expressing anger and frustration with his ex. Still continuing to rely on her for rides and running errands. [place] set for pt due to his awareness of not needing to speak to her at this time.   Pt showing difficulty with being isolated due to no transportation. Discussed barriers to lack of transportation. Pt weighing options at this time.   Expressed coping skills being something that he is tired of at this time and wanting to focus on identifying new , achievable coping skills that help decrease depressed mood.     Provided support to pt as well assisted with  encouragement to address emotional stated and assess coping skills in a small, realistic approach. Continuing to provide support to pt.   Will continue to follow.   Pt aware to contact sw for any additional needs that may occur prior to next session.  Therapeutic Intervention/Techniques: insight oriented, interactive, and supportive; relevant to diagnosis, patient responds to this modality    Risk Parameters:  Patient reports no suicidal ideation  Patient reports no homicidal ideation  Patient reports no self-injurious behavior  Patient reports no violent behavior    Diagnosis:   1. MDD (major depressive disorder), recurrent episode, mild        2. PTSD (post-traumatic stress disorder)        3. RENARD (generalized anxiety disorder)                    Return to Clinic: as scheduled  Counseling time: 45  -Call to report any worsening of symptoms or problems associated with medication.  - Pt instructed to go to ER if thoughts of harming self or others arise.   -Supportive therapy and psychoeducation provided  -Pt instructed to call clinic, 911 or go to nearest emergency room if sxs worsen or pt is in crisis. The pt expresses understanding.   Each patient to whom he or she provides medical services by telemedicine is:  (1) informed of the relationship between the physician and patient and the respective role of any other health care provider with respect to management of the patient; and (2) notified that he or she may decline to receive medical services by telemedicine and may withdraw from such care at any time.

## 2024-01-18 NOTE — PROGRESS NOTES
Site: Telemed in [place]     Date: 1/18/2024    Course Focus: Behavioral Coping     Length of service: 35 minutes    Number of participants in attendance: 4    Referred by: self-referred     Target symptoms: Depression     Participant response: Active Listening, Self-Disclosure    Progress toward goals: Progressing adequately     Interval History: Discussion of session #3: defining emotion driven behaviors, identifying alternative actions, creating SMART goals, and additional habit forming tips. Why are thoughts important, Automatic thoughts, thinking traps, and challenging questions for cognitive flexibility. Participants were given the homework of practicing alternative actions and setting a smart goal for the week.     Plan: Continue Depression Psychoeducational Course. Next meeting 4th Thursday of the month.

## 2024-01-22 ENCOUNTER — PATIENT MESSAGE (OUTPATIENT)
Dept: PSYCHIATRY | Facility: CLINIC | Age: 34
End: 2024-01-22
Payer: COMMERCIAL

## 2024-01-25 ENCOUNTER — PATIENT MESSAGE (OUTPATIENT)
Dept: PSYCHIATRY | Facility: CLINIC | Age: 34
End: 2024-01-25
Payer: COMMERCIAL

## 2024-02-19 ENCOUNTER — OFFICE VISIT (OUTPATIENT)
Dept: PSYCHIATRY | Facility: CLINIC | Age: 34
End: 2024-02-19
Payer: COMMERCIAL

## 2024-02-19 VITALS
HEIGHT: 67 IN | WEIGHT: 171.5 LBS | DIASTOLIC BLOOD PRESSURE: 63 MMHG | HEART RATE: 92 BPM | BODY MASS INDEX: 26.92 KG/M2 | SYSTOLIC BLOOD PRESSURE: 108 MMHG

## 2024-02-19 DIAGNOSIS — F43.10 PTSD (POST-TRAUMATIC STRESS DISORDER): Primary | ICD-10-CM

## 2024-02-19 PROCEDURE — 99999 PR PBB SHADOW E&M-EST. PATIENT-LVL III: CPT | Mod: PBBFAC,,, | Performed by: PSYCHOLOGIST

## 2024-02-19 PROCEDURE — 3074F SYST BP LT 130 MM HG: CPT | Mod: CPTII,S$GLB,, | Performed by: PSYCHOLOGIST

## 2024-02-19 PROCEDURE — 3078F DIAST BP <80 MM HG: CPT | Mod: CPTII,S$GLB,, | Performed by: PSYCHOLOGIST

## 2024-02-19 PROCEDURE — 1159F MED LIST DOCD IN RCRD: CPT | Mod: CPTII,S$GLB,, | Performed by: PSYCHOLOGIST

## 2024-02-19 PROCEDURE — 3008F BODY MASS INDEX DOCD: CPT | Mod: CPTII,S$GLB,, | Performed by: PSYCHOLOGIST

## 2024-02-19 PROCEDURE — 90833 PSYTX W PT W E/M 30 MIN: CPT | Mod: S$GLB,,, | Performed by: PSYCHOLOGIST

## 2024-02-19 PROCEDURE — 99214 OFFICE O/P EST MOD 30 MIN: CPT | Mod: S$GLB,,, | Performed by: PSYCHOLOGIST

## 2024-02-19 RX ORDER — ESCITALOPRAM OXALATE 20 MG/1
20 TABLET ORAL DAILY
Qty: 30 TABLET | Refills: 1 | Status: SHIPPED | OUTPATIENT
Start: 2024-02-19 | End: 2024-03-14 | Stop reason: SDUPTHER

## 2024-02-19 NOTE — PROGRESS NOTES
Outpatient Psychiatry Follow-Up Visit    Clinical Status of Patient: Outpatient (Ambulatory)  02/19/2024     Chief Complaint: 33 year old male presenting today for a follow-up.       Interval History and Content of Current Session:  Interim Events/Subjective Report/Content of Current Session:  follow-up appointment.    Pt is a 33 year old male with past psychiatric hx of PTSD who presents for follow-up treatment. Pt is taking Lexapro daily again and is highly depressed. Pt is not taking care of hygiene and is thinking of suicide. Pt said that he will not act on these thoughts due to who it would hurt. Pt also has hope that his situation will improve and that this is temporary. Pt denied any immediate risk. Pt denied any other major changes or new stressors since the last session.     Past Psychiatric hx: inpt X 1 in 2017 and prior treatment with Abilify, Trintellix, Wellbutrin, Zoloft (robotic), Buspar (effective), Prazosin (effective); Prozac (poor tolerance)    Past Medical hx:   Past Medical History:   Diagnosis Date    Anxiety     Depression     Tobacco use     Tricuspid insufficiency         Interim hx:  Medication changes last visit:   restarted Lexapro 10mg once daily  Anxiety: mild - unchanged  Depression: moderate - increased     Denies suicidal/homicidal ideations.  Denies hopelessness/worthlessness.    Denies auditory/visual hallucinations      Alcohol: minimal, infrequent  Drug: pt denied  Caffeine: minimal use  Tobacco: pt denied      Review of Systems   PSYCHIATRIC: Pertinent items are noted in the narrative.        CONSTITUTIONAL: weight stable    Past Medical, Family and Social History: The patient's past medical, family and social history have been reviewed and updated as appropriate within the electronic medical record. See encounter notes.     Current Psychiatric Medication:  Lexapro 10mg one daily     Compliance: yes     Side effects: pt denied    Risk Parameters:  Patient reports no suicidal  ideation  Patient reports no homicidal ideation  Patient reports no self-injurious behavior  Patient reports no violent behavior     Exam (detailed: at least 9 elements; comprehensive: all 15 elements)   Constitutional  Vitals:  Most recent vital signs, dated less than 90 days prior to this appointment, were reviewed. Pulse:  [92]   BP: (108)/(63)       General:  unremarkable, age appropriate, casual attire, good eye contact, good rapport       Musculoskeletal  Muscle Strength/Tone:  no flaccidity, no tremor    Gait & Station:  normal      Psychiatric                       Speech:  normal tone, normal rate, rhythm, and volume   Mood & Affect:   Depressed, anxious         Thought Process:   Goal directed; Linear    Associations:   intact   Thought Content:   No SI/HI, delusions, or paranoia, no AV/VH   Insight & Judgement:   Good, adequate to circumstances   Orientation:   grossly intact; alert and oriented x 4    Memory:  intact for content of interview    Language:  grossly intact, can repeat    Attention Span  : Grossly intact for content of interview   Fund of Knowledge:   intact and appropriate to age and level of education        Assessment and Diagnosis   Status/Progress: increasing distress     Impression: Pt struggles with depression and anxiety symptoms in the context of complex trauma. Pt should continue in psychiatric medication management, start counseling, and start a cardio exercise regimen.     Diagnosis: PTSD    Intervention/Counseling/Treatment Plan   Medication Management:      Increase Lexapro dosage to 20mg Q D    2. Continue therapy with FRANCIA    3. Start a cardio exercise regimen     4. Call to report any worsening of symptoms or problems with the medication. Pt instructed to go to ER with thoughts of harming self, others    Psychotherapy:   Target symptoms: depression, anxiety  Why chosen therapy is appropriate versus another modality: CBT used; relevant to diagnosis, patient responds to this  modality  Outcome monitoring methods: self-report, observation  Therapeutic intervention type: Cognitive Behavioral Therapy  Topics discussed/themes: building skills sets for symptom management, symptom recognition, nutrition, exercise  The patient's response to the intervention is good  Patient's response to treatment is: good.   The patient's progress toward treatment goals: increasing distress  16 minutes spent with pt in psychotherapy and 5 minutes in medication management     Return to clinic: 1 month    -Cognitive-Behavioral/Supportive therapy and psychoeducation provided  -R/B/SE's of medications discussed with the pt who expresses understanding and chooses to take medications as prescribed.   -Pt instructed to call clinic, 911 or go to nearest emergency room if sxs worsen or pt is in   crisis. The pt expresses understanding.    Jose A Beaver, PhD, MP

## 2024-02-23 ENCOUNTER — OFFICE VISIT (OUTPATIENT)
Dept: PSYCHIATRY | Facility: CLINIC | Age: 34
End: 2024-02-23
Payer: COMMERCIAL

## 2024-02-23 DIAGNOSIS — F43.10 PTSD (POST-TRAUMATIC STRESS DISORDER): Primary | ICD-10-CM

## 2024-02-23 DIAGNOSIS — F41.1 GAD (GENERALIZED ANXIETY DISORDER): ICD-10-CM

## 2024-02-23 DIAGNOSIS — F33.0 MDD (MAJOR DEPRESSIVE DISORDER), RECURRENT EPISODE, MILD: ICD-10-CM

## 2024-02-23 PROCEDURE — 90834 PSYTX W PT 45 MINUTES: CPT | Mod: 95,,, | Performed by: SOCIAL WORKER

## 2024-02-23 NOTE — PROGRESS NOTES
The patient location is:  [id number]   [facility] [address]  The patient location [place] is: [place] [address]  The patient phone number is: [phone]  Visit type: Virtual visit with synchronous audio and video  Each patient to whom he or she provides medical services by telemedicine is:  (1) informed of the relationship between the physician and patient and the respective role of any other health care provider with respect to management of the patient; and (2) notified that he or she may decline to receive medical services by telemedicine and may withdraw from such care at any time.  Crisis Disclaimer: Patient was informed that due to the virtual nature of the visit, that if a crisis develops, protocols will be implemented to ensure patient safety, including but not limited to: 1) Initiating a welfare check with local Law Enforcement, 2) Calling 1/National Crisis Hotline, and/or 3) Initiating PEC/CEC procedures.    Individual Psychotherapy (PhD/LCSW)    02/23/2024    Interim Events/Subjective Report/Content of Current Session:  follow-up appointment.    Pt is a 33 y.o. male with past psychiatric hx of mdd and ptsd who presents for follow-up treatment.  Pt stated he has been continuing to miss appointments due to his co-worker having her child inpt for psych.    Grief reaction from break up continues to be present. Depression symptoms continue as well. Trouble with appetite, hygiene, low motivation.  Reported he is able to maintain healthy work ethic at this time.  Si fleeting at times.Pt denies any current SI/HI. Denies any current A/VH.  Denied fleeting ideation to possess plan or intention.    Patient was quiet in session.  Not engaging with conversation easily.  Observed patient playing game on phone during virtual visit as well. Pt was provided encouragement and time to process emotions. Sw provided support as well to assist in validation and normalization of grief reaction increasing depression. Pt  reported coping skills are maintained at this time.      Will continue to follow.   Pt aware to contact sw for any additional needs that may occur prior to next session.  Therapeutic Intervention/Techniques: insight oriented, interactive, and supportive; relevant to diagnosis, patient responds to this modality    Risk Parameters:  Patient reports no suicidal ideation  Patient reports no homicidal ideation  Patient reports no self-injurious behavior  Patient reports no violent behavior    Diagnosis:   1. PTSD (post-traumatic stress disorder)        2. MDD (major depressive disorder), recurrent episode, mild        3. RENARD (generalized anxiety disorder)            Return to Clinic: as scheduled  Counseling time: 45  -Call to report any worsening of symptoms or problems associated with medication.  - Pt instructed to go to ER if thoughts of harming self or others arise.   -Supportive therapy and psychoeducation provided  -Pt instructed to call clinic, 911 or go to nearest emergency room if sxs worsen or pt is in crisis. The pt expresses understanding.   Each patient to whom he or she provides medical services by telemedicine is:  (1) informed of the relationship between the physician and patient and the respective role of any other health care provider with respect to management of the patient; and (2) notified that he or she may decline to receive medical services by telemedicine and may withdraw from such care at any time.

## 2024-02-28 ENCOUNTER — NURSE TRIAGE (OUTPATIENT)
Dept: ADMINISTRATIVE | Facility: CLINIC | Age: 34
End: 2024-02-28
Payer: COMMERCIAL

## 2024-02-28 ENCOUNTER — OFFICE VISIT (OUTPATIENT)
Dept: FAMILY MEDICINE | Facility: CLINIC | Age: 34
End: 2024-02-28
Payer: COMMERCIAL

## 2024-02-28 VITALS — BODY MASS INDEX: 26.84 KG/M2 | WEIGHT: 171 LBS | HEIGHT: 67 IN | HEART RATE: 80 BPM

## 2024-02-28 DIAGNOSIS — J20.8 ACUTE BACTERIAL BRONCHITIS: Primary | ICD-10-CM

## 2024-02-28 DIAGNOSIS — R05.9 COUGH, UNSPECIFIED TYPE: ICD-10-CM

## 2024-02-28 DIAGNOSIS — B96.89 ACUTE BACTERIAL BRONCHITIS: Primary | ICD-10-CM

## 2024-02-28 PROCEDURE — 1160F RVW MEDS BY RX/DR IN RCRD: CPT | Mod: CPTII,95,, | Performed by: NURSE PRACTITIONER

## 2024-02-28 PROCEDURE — 3008F BODY MASS INDEX DOCD: CPT | Mod: CPTII,95,, | Performed by: NURSE PRACTITIONER

## 2024-02-28 PROCEDURE — 99213 OFFICE O/P EST LOW 20 MIN: CPT | Mod: 95,,, | Performed by: NURSE PRACTITIONER

## 2024-02-28 PROCEDURE — 1159F MED LIST DOCD IN RCRD: CPT | Mod: CPTII,95,, | Performed by: NURSE PRACTITIONER

## 2024-02-28 RX ORDER — BENZONATATE 200 MG/1
200 CAPSULE ORAL 3 TIMES DAILY PRN
Qty: 30 CAPSULE | Refills: 0 | Status: SHIPPED | OUTPATIENT
Start: 2024-02-28 | End: 2024-03-09

## 2024-02-28 RX ORDER — DOXYCYCLINE 100 MG/1
100 CAPSULE ORAL 2 TIMES DAILY
Qty: 14 CAPSULE | Refills: 0 | Status: SHIPPED | OUTPATIENT
Start: 2024-02-28 | End: 2024-03-06

## 2024-02-28 NOTE — TELEPHONE ENCOUNTER
"Pt was prescribed doxycycline today and took the first dose a short time ago, but vomited an hour later and is worried he vomited the med and asking if he should repeat the dose. Pt did not see the capsule in his vomit. Was prescribed med today to treat bacterial bronchitis. Also c/o HA rated 7/10 that started 2 days ago, did speak with MD about it today.     Dispo- call pcp within 24 hours. Pt advised I'd send message to providers office requesting callback within 24 hours. Further care advice given, pt VU.  Reason for Disposition   Vomiting a prescription medication    Additional Information   Negative: Shock suspected (e.g., cold/pale/clammy skin, too weak to stand, low BP, rapid pulse)   Negative: Difficult to awaken or acting confused (e.g., disoriented, slurred speech)   Negative: Sounds like a life-threatening emergency to the triager   Negative: [1] Vomiting AND [2] contains red blood or black ("coffee ground") material  (Exception: Few red streaks in vomit that only happened once.)   Negative: Severe pain in one eye   Negative: Recent head injury (within last 3 days)   Negative: Recent abdominal injury (within last 3 days)   Negative: [1] Insulin-dependent diabetes (Type I) AND [2] glucose > 400 mg/dl (22 mmol/l)   Negative: [1] Vomiting AND [2] hernia is more painful or swollen than usual   Negative: [1] SEVERE vomiting (e.g., 6 or more times/day) AND [2] present > 8 hours (Exception: Patient sounds well, is drinking liquids, does not sound dehydrated, and vomiting has lasted less than 24 hours.)   Negative: [1] MODERATE vomiting (e.g., 3 - 5 times/day) AND [2] age > 60 years   Negative: Severe headache (e.g., excruciating)  (Exception: Similar to previous migraines.)   Negative: High-risk adult (e.g., diabetes mellitus, brain tumor, V-P shunt, hernia)   Negative: [1] Drinking very little AND [2] dehydration suspected (e.g., no urine > 12 hours, very dry mouth, very lightheaded)   Negative: Patient sounds " very sick or weak to the triager   Negative: [1] Vomiting AND [2] abdomen looks much more swollen than usual   Negative: [1] Vomiting AND [2] contains bile (green color)   Negative: [1] Constant abdominal pain AND [2] present > 2 hours   Negative: [1] Fever > 103 F (39.4 C) AND [2] not able to get the fever down using Fever Care Advice   Negative: [1] Fever > 101 F (38.3 C) AND [2] age > 60 years   Negative: [1] Fever > 100.0 F (37.8 C) AND [2] bedridden (e.g., CVA, chronic illness, recovering from surgery)   Negative: [1] Fever > 100.0 F (37.8 C) AND [2] weak immune system (e.g., HIV positive, cancer chemo, splenectomy, organ transplant, chronic steroids)   Negative: Taking any of the following medications: digoxin (Lanoxin), lithium, theophylline, phenytoin (Dilantin)   Negative: [1] MILD or MODERATE vomiting AND [2] present > 48 hours (2 days) (Exception: Mild vomiting with associated diarrhea.)   Negative: Fever present > 3 days (72 hours)    Protocols used: Vomiting-A-AH

## 2024-02-28 NOTE — PROGRESS NOTES
Assessment and Plan:  Felipe was seen today for cough.    Diagnoses and all orders for this visit:    Acute bacterial bronchitis  Comments:  Treat symptoms as discussed.  Complete entire course of doxycycline as prescribed.  Stay well hydrated.  Obtain some rest.  Orders:  -     doxycycline (VIBRAMYCIN) 100 MG Cap; Take 1 capsule (100 mg total) by mouth 2 (two) times daily. for 7 days    Cough, unspecified type  Comments:  May try Mucinex DM to help with congestion  Orders:  -     benzonatate (TESSALON) 200 MG capsule; Take 1 capsule (200 mg total) by mouth 3 (three) times daily as needed for Cough.         Follow up in about 1 week (around 3/6/2024).   ______________________________________________________________________  Subjective:    Chief Complaint:  Respiratory infection    HPI:  Felipe is a 33 y.o. year old male being seen today in virtual visit consultation to discuss  Possible bronchitis C/o fever, cough, runny nose and bodyaches . Patient reports cough is productive  with thick yellowish-green sputum.  Patient reports symptoms worsening over the past 4 days.   Taking Naproxen, which is helping with fever.      The patient location is:  Patient Home   The chief complaint leading to consultation is:  Bronchitis  Visit type: Virtual visit with synchronous audio and video  Total time spent with patient: 15  Each patient to whom he or she provides medical services by telemedicine is:  (1) informed of the relationship between the physician and patient and the respective role of any other health care provider with respect to management of the patient; and (2) notified that he or she may decline to receive medical services by telemedicine and may withdraw from such care at any time.        Medications:  Current Outpatient Medications on File Prior to Visit   Medication Sig Dispense Refill    EScitalopram oxalate (LEXAPRO) 20 MG tablet Take 1 tablet (20 mg total) by mouth once daily. 30 tablet 1    famotidine  "(PEPCID) 40 MG tablet TAKE 1 TABLET(40 MG) BY MOUTH TWICE DAILY 180 tablet 1    ibuprofen (ADVIL,MOTRIN) 200 MG tablet Take 200 mg by mouth as needed for Pain.      Lacto.acidophilus-Bif.animalis 5 billion cell CpSP Take 1 capsule by mouth once daily.      multivit-min-folic-vit K-lycop 400- mcg Tab Take 1 tablet by mouth once daily.      omeprazole (PRILOSEC) 40 MG capsule TAKE 1 CAPSULE(40 MG) BY MOUTH EVERY MORNING 90 capsule 1    tadalafiL (CIALIS) 5 MG tablet Take 5 mg by mouth daily as needed.       No current facility-administered medications on file prior to visit.       Review of Systems:  Review of Systems   Constitutional:  Positive for activity change, chills, fatigue and fever. Negative for unexpected weight change.   HENT:  Positive for rhinorrhea. Negative for hearing loss, postnasal drip and trouble swallowing.    Eyes:  Negative for discharge and visual disturbance.   Respiratory:  Positive for cough and chest tightness. Negative for shortness of breath and wheezing.    Cardiovascular:  Negative for chest pain and palpitations.   Gastrointestinal:  Negative for blood in stool, constipation, diarrhea and vomiting.   Endocrine: Negative for polydipsia and polyuria.   Genitourinary:  Negative for difficulty urinating, hematuria and urgency.   Musculoskeletal:  Negative for arthralgias, joint swelling and neck pain.   Psychiatric/Behavioral:  Negative for confusion and dysphoric mood.        Past Medical History:  Past Medical History:   Diagnosis Date    Anxiety     Depression     Tobacco use     Tricuspid insufficiency        Objective:    Vitals:  Vitals:    02/28/24 1303   Pulse: 80   Weight: 77.6 kg (171 lb)   Height: 5' 7" (1.702 m)       Physical Exam  Constitutional:       General: He is not in acute distress.     Appearance: He is ill-appearing.   HENT:      Head: Normocephalic.      Nose: Congestion (sounds congested) present.   Cardiovascular:      Rate and Rhythm: Normal rate. "   Pulmonary:      Effort: Pulmonary effort is normal. No respiratory distress.      Comments: Coughing during virtual visit  Neurological:      Mental Status: He is alert and oriented to person, place, and time.   Psychiatric:         Mood and Affect: Mood normal.         Behavior: Behavior normal.         Thought Content: Thought content normal.         Data:        Medical history reviewed, Medications reconciled.      Visit Summary:  Mr. Nava was seen via virtual visit for acute bacterial bronchitis.  Rx doxycycline, Tessalon Perles as needed for cough  Advised on medications and symptomatic treatment.  Patient will add Mucinex.  He will take Tylenol or ibuprofen as needed for fever and/or pain.  Emergent conditions discussed.  Patient to follow up in 1 week in clinic.         BELEM Rossi-C  Family Medicine

## 2024-02-29 NOTE — TELEPHONE ENCOUNTER
Spoke w/ pt , pt has not vomited anymore. Pt advised that he should wait until the next scheduled time to take his dose of antibiotic . --lp   Lot/Batch Number (Optional): [id number] Lot/Batch Number (Optional): [id number]

## 2024-03-01 ENCOUNTER — OFFICE VISIT (OUTPATIENT)
Dept: PSYCHIATRY | Facility: CLINIC | Age: 34
End: 2024-03-01
Payer: COMMERCIAL

## 2024-03-01 DIAGNOSIS — F41.1 GAD (GENERALIZED ANXIETY DISORDER): ICD-10-CM

## 2024-03-01 DIAGNOSIS — F33.0 MDD (MAJOR DEPRESSIVE DISORDER), RECURRENT EPISODE, MILD: ICD-10-CM

## 2024-03-01 DIAGNOSIS — F43.10 PTSD (POST-TRAUMATIC STRESS DISORDER): Primary | ICD-10-CM

## 2024-03-01 PROCEDURE — 90834 PSYTX W PT 45 MINUTES: CPT | Mod: 95,,, | Performed by: SOCIAL WORKER
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
## 2024-03-12 ENCOUNTER — PATIENT MESSAGE (OUTPATIENT)
Dept: PSYCHIATRY | Facility: CLINIC | Age: 34
End: 2024-03-12
Payer: COMMERCIAL

## 2024-03-14 ENCOUNTER — OFFICE VISIT (OUTPATIENT)
Dept: PSYCHIATRY | Facility: CLINIC | Age: 34
End: 2024-03-14
Payer: COMMERCIAL

## 2024-03-14 VITALS
HEART RATE: 62 BPM | WEIGHT: 162.69 LBS | DIASTOLIC BLOOD PRESSURE: 75 MMHG | SYSTOLIC BLOOD PRESSURE: 115 MMHG | BODY MASS INDEX: 25.53 KG/M2 | HEIGHT: 67 IN

## 2024-03-14 DIAGNOSIS — F43.10 PTSD (POST-TRAUMATIC STRESS DISORDER): Primary | ICD-10-CM

## 2024-03-14 PROCEDURE — 90833 PSYTX W PT W E/M 30 MIN: CPT | Mod: S$GLB,,, | Performed by: PSYCHOLOGIST

## 2024-03-14 PROCEDURE — 3008F BODY MASS INDEX DOCD: CPT | Mod: CPTII,S$GLB,, | Performed by: PSYCHOLOGIST

## 2024-03-14 PROCEDURE — 3074F SYST BP LT 130 MM HG: CPT | Mod: CPTII,S$GLB,, | Performed by: PSYCHOLOGIST

## 2024-03-14 PROCEDURE — 99213 OFFICE O/P EST LOW 20 MIN: CPT | Mod: S$GLB,,, | Performed by: PSYCHOLOGIST

## 2024-03-14 PROCEDURE — 99999 PR PBB SHADOW E&M-EST. PATIENT-LVL III: CPT | Mod: PBBFAC,,, | Performed by: PSYCHOLOGIST

## 2024-03-14 PROCEDURE — 3078F DIAST BP <80 MM HG: CPT | Mod: CPTII,S$GLB,, | Performed by: PSYCHOLOGIST

## 2024-03-14 PROCEDURE — 1159F MED LIST DOCD IN RCRD: CPT | Mod: CPTII,S$GLB,, | Performed by: PSYCHOLOGIST

## 2024-03-14 RX ORDER — PROMETHAZINE HYDROCHLORIDE AND DEXTROMETHORPHAN HYDROBROMIDE 6.25; 15 MG/5ML; MG/5ML
5 SYRUP ORAL
COMMUNITY
Start: 2024-03-06 | End: 2024-06-18

## 2024-03-14 RX ORDER — ESCITALOPRAM OXALATE 20 MG/1
20 TABLET ORAL DAILY
Qty: 90 TABLET | Refills: 1 | Status: SHIPPED | OUTPATIENT
Start: 2024-03-14 | End: 2024-06-11 | Stop reason: SDUPTHER

## 2024-03-14 RX ORDER — ALBUTEROL SULFATE 90 UG/1
AEROSOL, METERED RESPIRATORY (INHALATION)
COMMUNITY
Start: 2024-03-06

## 2024-03-14 RX ORDER — ONDANSETRON 8 MG/1
8 TABLET, ORALLY DISINTEGRATING ORAL
COMMUNITY
Start: 2024-03-06 | End: 2024-06-18

## 2024-03-14 NOTE — PROGRESS NOTES
Outpatient Psychiatry Follow-Up Visit    Clinical Status of Patient: Outpatient (Ambulatory)  03/14/2024     Chief Complaint: 33 year old male presenting today for a follow-up.       Interval History and Content of Current Session:  Interim Events/Subjective Report/Content of Current Session:  follow-up appointment.    Pt is a 33 year old male with past psychiatric hx of PTSD who presents for follow-up treatment. Pt is feeling somewhat better despite struggling through an illness. [place] Pt discussed feeling like a burden and we discussed connecting with friends. Pt denied any other major changes or new stressors since the last session.     Past Psychiatric hx: inpt X 1 in 2017 and prior treatment with Abilify, Trintellix, Wellbutrin, Zoloft (robotic), Buspar (effective), Prazosin (effective); Prozac (poor tolerance)    Past Medical hx:   Past Medical History:   Diagnosis Date    Anxiety     Depression     Tobacco use     Tricuspid insufficiency         Interim hx:  Medication changes last visit:   increased Lexapro dosage to 20mg Q D  Anxiety: mild - unchanged  Depression: moderate - improved     Denies suicidal/homicidal ideations.  Denies hopelessness/worthlessness.    Denies auditory/visual hallucinations      Alcohol: minimal, infrequent  Drug: pt denied  Caffeine: minimal use  Tobacco: pt denied      Review of Systems   PSYCHIATRIC: Pertinent items are noted in the narrative.        CONSTITUTIONAL: weight stable    Past Medical, Family and Social History: The patient's past medical, family and social history have been reviewed and updated as appropriate within the electronic medical record. See encounter notes.     Current Psychiatric Medication:  Lexapro 20mg one daily     Compliance: yes     Side effects: pt denied    Risk Parameters:  Patient reports no suicidal ideation  Patient reports no homicidal ideation  Patient reports no self-injurious  behavior  Patient reports no violent behavior     Exam (detailed: at least 9 elements; comprehensive: all 15 elements)   Constitutional  Vitals:  Most recent vital signs, dated less than 90 days prior to this appointment, were reviewed. Pulse:  [62]   BP: (115)/(75)       General:  unremarkable, age appropriate, casual attire, good eye contact, good rapport       Musculoskeletal  Muscle Strength/Tone:  no flaccidity, no tremor    Gait & Station:  normal      Psychiatric                       Speech:  normal tone, normal rate, rhythm, and volume   Mood & Affect:   Depressed, anxious         Thought Process:   Goal directed; Linear    Associations:   intact   Thought Content:   No SI/HI, delusions, or paranoia, no AV/VH   Insight & Judgement:   Good, adequate to circumstances   Orientation:   grossly intact; alert and oriented x 4    Memory:  intact for content of interview    Language:  grossly intact, can repeat    Attention Span  : Grossly intact for content of interview   Fund of Knowledge:   intact and appropriate to age and level of education        Assessment and Diagnosis   Status/Progress: improving     Impression: Pt struggles with depression and anxiety symptoms in the context of complex trauma. Pt should continue in psychiatric medication management, start counseling, and start a cardio exercise regimen.     Diagnosis: PTSD    Intervention/Counseling/Treatment Plan   Medication Management:      Continue Lexapro 20mg Q D    2. Continue therapy with FRANCIA    3. Start a cardio exercise regimen     4. Call to report any worsening of symptoms or problems with the medication. Pt instructed to go to ER with thoughts of harming self, others    Psychotherapy:   Target symptoms: depression, anxiety  Why chosen therapy is appropriate versus another modality: CBT used; relevant to diagnosis, patient responds to this modality  Outcome monitoring methods: self-report, observation  Therapeutic intervention type: Cognitive  Behavioral Therapy  Topics discussed/themes: building skills sets for symptom management, symptom recognition, nutrition, exercise  The patient's response to the intervention is good  Patient's response to treatment is: good.   The patient's progress toward treatment goals: improving  16 minutes spent with pt in psychotherapy and 5 minutes in medication management     Return to clinic: 3 months    -Cognitive-Behavioral/Supportive therapy and psychoeducation provided  -R/B/SE's of medications discussed with the pt who expresses understanding and chooses to take medications as prescribed.   -Pt instructed to call clinic, 911 or go to nearest emergency room if sxs worsen or pt is in   crisis. The pt expresses understanding.    Jose A Beaver, PhD, MP

## 2024-03-15 ENCOUNTER — PATIENT MESSAGE (OUTPATIENT)
Dept: PSYCHIATRY | Facility: CLINIC | Age: 34
End: 2024-03-15
Payer: COMMERCIAL

## 2024-03-15 ENCOUNTER — OFFICE VISIT (OUTPATIENT)
Dept: PSYCHIATRY | Facility: CLINIC | Age: 34
End: 2024-03-15
Payer: COMMERCIAL

## 2024-03-15 DIAGNOSIS — F33.0 MDD (MAJOR DEPRESSIVE DISORDER), RECURRENT EPISODE, MILD: Primary | ICD-10-CM

## 2024-03-15 DIAGNOSIS — F43.10 PTSD (POST-TRAUMATIC STRESS DISORDER): ICD-10-CM

## 2024-03-15 DIAGNOSIS — F41.1 GAD (GENERALIZED ANXIETY DISORDER): ICD-10-CM

## 2024-03-15 PROCEDURE — 90834 PSYTX W PT 45 MINUTES: CPT | Mod: 95,,, | Performed by: SOCIAL WORKER
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
## 2024-03-22 ENCOUNTER — OFFICE VISIT (OUTPATIENT)
Dept: PSYCHIATRY | Facility: CLINIC | Age: 34
End: 2024-03-22
Payer: COMMERCIAL

## 2024-03-22 DIAGNOSIS — F33.0 MDD (MAJOR DEPRESSIVE DISORDER), RECURRENT EPISODE, MILD: ICD-10-CM

## 2024-03-22 DIAGNOSIS — F43.10 PTSD (POST-TRAUMATIC STRESS DISORDER): Primary | ICD-10-CM

## 2024-03-22 DIAGNOSIS — F41.1 GAD (GENERALIZED ANXIETY DISORDER): ICD-10-CM

## 2024-03-22 PROCEDURE — 90834 PSYTX W PT 45 MINUTES: CPT | Mod: 95,,, | Performed by: SOCIAL WORKER

## 2024-03-22 NOTE — PROGRESS NOTES
"    The patient location is:  [id number]   [facility] [address]  The patient location [place] is: [address] [address]  The patient phone number is: [phone]  Visit type: Virtual visit with synchronous audio and video  Each patient to whom he or she provides medical services by telemedicine is:  (1) informed of the relationship between the physician and patient and the respective role of any other health care provider with respect to management of the patient; and (2) notified that he or she may decline to receive medical services by telemedicine and may withdraw from such care at any time.  Crisis Disclaimer: Patient was informed that due to the virtual nature of the visit, that if a crisis develops, protocols will be implemented to ensure patient safety, including but not limited to: 1) Initiating a welfare check with local Law Enforcement, 2) Calling 1/National Crisis Hotline, and/or 3) Initiating PEC/CEC procedures.    Individual Psychotherapy (PhD/LCSW)    03/22/2024    Interim Events/Subjective Report/Content of Current Session:  follow-up appointment.    Pt is a 33 y.o. male with past psychiatric hx of mdd and ptsd who presents for follow-up treatment.  Pt stated he is continuing to be more serious about moving due to work. Also, feeling like moving could be more beneficial to his mental health and decrease isolation. Pt concerned about this putting more space between himself and ex.   Identified that when he becomes overwhelmed with thoughts he "gets out of it" as quickly as possible. Wanting to move through this anxiety. Fear of having a panic attack if he were to "sit with it and push it all the way." Discussed worse, best, and most likely case scenarios within decision making. Pt processed goals for near future and emotions that are involved. Pt identified in session that he is able to give others more care and empathy than he has given himself.     Mood was congruent to topics of conversation.  " "Appropriate to situation. Identified having trouble sleeping. "Actively not wanting to go to sleep because sometimes my dreams are better than my life."     Will continue to follow.   Pt aware to contact sw for any additional needs that may occur prior to next session.  Therapeutic Intervention/Techniques: insight oriented, interactive, and supportive; relevant to diagnosis, patient responds to this modality    Risk Parameters:  Patient reports no suicidal ideation  Patient reports no homicidal ideation  Patient reports no self-injurious behavior  Patient reports no violent behavior    Diagnosis:   1. PTSD (post-traumatic stress disorder)        2. MDD (major depressive disorder), recurrent episode, mild        3. RENARD (generalized anxiety disorder)            Return to Clinic: as scheduled  Counseling time: 45  -Call to report any worsening of symptoms or problems associated with medication.  - Pt instructed to go to ER if thoughts of harming self or others arise.   -Supportive therapy and psychoeducation provided  -Pt instructed to call clinic, 911 or go to nearest emergency room if sxs worsen or pt is in crisis. The pt expresses understanding.   Each patient to whom he or she provides medical services by telemedicine is:  (1) informed of the relationship between the physician and patient and the respective role of any other health care provider with respect to management of the patient; and (2) notified that he or she may decline to receive medical services by telemedicine and may withdraw from such care at any time.  "

## 2024-06-06 ENCOUNTER — TELEPHONE (OUTPATIENT)
Dept: FAMILY MEDICINE | Facility: CLINIC | Age: 34
End: 2024-06-06
Payer: COMMERCIAL

## 2024-06-06 NOTE — TELEPHONE ENCOUNTER
----- Message from Caryl Skinner sent at 6/6/2024  4:32 PM CDT -----  Contact: [facility]  Type:  Needs Medical Advice    Who Called: Kiera rowley/Maria Dolores  Best Call Back Number:  [phone] Fax: [phone]  Additional Information: Faxed alcohol/drug questionnaire for pt and it is to be returned as soon as possible for [employer] for the pt... Please call to advise or return faxed form. Thank you..

## 2024-06-11 ENCOUNTER — OFFICE VISIT (OUTPATIENT)
Dept: PSYCHIATRY | Facility: CLINIC | Age: 34
End: 2024-06-11
Payer: COMMERCIAL

## 2024-06-11 DIAGNOSIS — F43.10 PTSD (POST-TRAUMATIC STRESS DISORDER): Primary | ICD-10-CM

## 2024-06-11 PROCEDURE — 90833 PSYTX W PT W E/M 30 MIN: CPT | Mod: 95,,, | Performed by: PSYCHOLOGIST

## 2024-06-11 PROCEDURE — 99214 OFFICE O/P EST MOD 30 MIN: CPT | Mod: 95,,, | Performed by: PSYCHOLOGIST

## 2024-06-11 PROCEDURE — 1159F MED LIST DOCD IN RCRD: CPT | Mod: CPTII,95,, | Performed by: PSYCHOLOGIST

## 2024-06-11 RX ORDER — ESCITALOPRAM OXALATE 20 MG/1
20 TABLET ORAL DAILY
Qty: 90 TABLET | Refills: 1 | Status: SHIPPED | OUTPATIENT
Start: 2024-06-11 | End: 2025-06-11

## 2024-06-11 RX ORDER — BUPROPION HYDROCHLORIDE 150 MG/1
150 TABLET ORAL DAILY
Qty: 30 TABLET | Refills: 2 | Status: SHIPPED | OUTPATIENT
Start: 2024-06-11 | End: 2025-06-11

## 2024-06-11 NOTE — PROGRESS NOTES
"The patient location is: [place], [place]  The chief complaint leading to consultation is: depression, PTSD  Visit type: Virtual visit with synchronous audio and video  Each patient to whom he or she provides medical services by telemedicine is:  (1) informed of the relationship between the physician and patient and the respective role of any other health care provider with respect to management of the patient; and (2) notified that he or she may decline to receive medical services by telemedicine and may withdraw from such care at any time.  Face-to-Face time: 21 minutes    Notes:      Outpatient Psychiatry Follow-Up Visit    Clinical Status of Patient: Outpatient (Ambulatory)  06/11/2024     Chief Complaint: 33 year old male presenting today for a follow-up.       Interval History and Content of Current Session:  Interim Events/Subjective Report/Content of Current Session:  follow-up appointment.    Pt is a 33 year old male with past psychiatric hx of PTSD who presents for follow-up treatment. Pt is more depressed. Pt is sleeping all the time and feels like he is "wasting away". Pt will move to the  in a few weeks and thinks that will help as he will be less isolated, although he will be paying more. Pt denied any other major changes or new stressors since the last session.     Past Psychiatric hx: inpt X 1 in 2017 and prior treatment with Abilify, Trintellix, Wellbutrin, Zoloft (robotic), Buspar (effective), Prazosin (effective); Prozac (poor tolerance)    Past Medical hx:   Past Medical History:   Diagnosis Date    Anxiety     Depression     Tobacco use     Tricuspid insufficiency         Interim hx:  Medication changes last visit:  none  Anxiety: mild - unchanged  Depression: moderate - increased     Denies suicidal/homicidal ideations.  Denies hopelessness/worthlessness.    Denies auditory/visual hallucinations      Alcohol: minimal, infrequent  Drug: pt denied  Caffeine: minimal use  Tobacco: pt " denied      Review of Systems   PSYCHIATRIC: Pertinent items are noted in the narrative.        CONSTITUTIONAL: weight stable    Past Medical, Family and Social History: The patient's past medical, family and social history have been reviewed and updated as appropriate within the electronic medical record. See encounter notes.     Current Psychiatric Medication:  Lexapro 20mg one daily     Compliance: yes     Side effects: pt denied    Risk Parameters:  Patient reports no suicidal ideation  Patient reports no homicidal ideation  Patient reports no self-injurious behavior  Patient reports no violent behavior     Exam (detailed: at least 9 elements; comprehensive: all 15 elements)   Constitutional  Vitals:  Most recent vital signs, dated less than 90 days prior to this appointment, were reviewed.        General:  unremarkable, age appropriate, casual attire, good eye contact, good rapport       Musculoskeletal  Muscle Strength/Tone:  no flaccidity, no tremor    Gait & Station:  normal      Psychiatric                       Speech:  normal tone, normal rate, rhythm, and volume   Mood & Affect:   Depressed, anxious         Thought Process:   Goal directed; Linear    Associations:   intact   Thought Content:   No SI/HI, delusions, or paranoia, no AV/VH   Insight & Judgement:   Good, adequate to circumstances   Orientation:   grossly intact; alert and oriented x 4    Memory:  intact for content of interview    Language:  grossly intact, can repeat    Attention Span  : Grossly intact for content of interview   Fund of Knowledge:   intact and appropriate to age and level of education        Assessment and Diagnosis   Status/Progress: increasing distress     Impression: Pt struggles with depression and anxiety symptoms in the context of complex trauma. Pt should continue in psychiatric medication management, start counseling, and start a cardio exercise regimen.     Diagnosis: PTSD    Intervention/Counseling/Treatment Plan    Medication Management:      Continue Lexapro 20mg Q D    2. Start Wellbutrin XL 150mg Q AM    3. Continue therapy with FRANCIA    4. Start a cardio exercise regimen     5. Call to report any worsening of symptoms or problems with the medication. Pt instructed to go to ER with thoughts of harming self, others    Psychotherapy:   Target symptoms: depression, anxiety  Why chosen therapy is appropriate versus another modality: CBT used; relevant to diagnosis, patient responds to this modality  Outcome monitoring methods: self-report, observation  Therapeutic intervention type: Cognitive Behavioral Therapy  Topics discussed/themes: building skills sets for symptom management, symptom recognition, nutrition, exercise  The patient's response to the intervention is good  Patient's response to treatment is: good.   The patient's progress toward treatment goals: increasing distress  16 minutes spent with pt in psychotherapy and 5 minutes in medication management     Return to clinic: 6 weeks or earlier PRN    -Cognitive-Behavioral/Supportive therapy and psychoeducation provided  -R/B/SE's of medications discussed with the pt who expresses understanding and chooses to take medications as prescribed.   -Pt instructed to call clinic, 911 or go to nearest emergency room if sxs worsen or pt is in   crisis. The pt expresses understanding.    Jose A Beaver, PhD, MP     Antidepressant/Antianxiety Medication Initiation:  Patient informed of risks, benefits, and potential side effects of medication and accepts informed consent.  Common side effects include nausea, fatigue, headache, insomnia., Specifically discussed the possibility of new or worsening suicidal thoughts/depression.  Patient instructed to stop the medication immediately and seek urgent treatment if this occurs. Patient instructed not to abruptly discontinue medication without physician guidance except in cases of sudden onset or worsening of SI.

## 2024-06-13 ENCOUNTER — TELEPHONE (OUTPATIENT)
Dept: FAMILY MEDICINE | Facility: CLINIC | Age: 34
End: 2024-06-13
Payer: COMMERCIAL

## 2024-06-13 NOTE — TELEPHONE ENCOUNTER
----- Message from Connie Faulkner sent at 6/12/2024  4:17 PM CDT -----  Regarding: alcohol drug questionnaire  Contact: [facility]  Type:  Needs Medical Advice    Who Called: [facility]  Would the patient rather a call back or a response via [facility]? Call back  Best Call Back Number: [phone]  ext [phone]  Additional Information: wants to know if the forms have been has been received and completed. It is urgent they receive these forms back--please advise

## 2024-06-14 ENCOUNTER — TELEPHONE (OUTPATIENT)
Dept: PRIMARY CARE CLINIC | Facility: CLINIC | Age: 34
End: 2024-06-14
Payer: COMMERCIAL

## 2024-06-14 ENCOUNTER — TELEPHONE (OUTPATIENT)
Dept: FAMILY MEDICINE | Facility: CLINIC | Age: 34
End: 2024-06-14
Payer: COMMERCIAL

## 2024-06-14 NOTE — TELEPHONE ENCOUNTER
Spoke with patient and explained to him that I was not receiving the paper and to see if he can send it to me.

## 2024-06-14 NOTE — TELEPHONE ENCOUNTER
----- Message from Conchita Doran sent at 6/14/2024 11:38 AM CDT -----  Contact: [facility]  Type:  Needs Medical Advice    Who Called: [facility]    Would the patient rather a call back or a response via [facility]?  Call back    Best Call Back Number:   [phone]    Additional Information: is following up on the alcohol and drug questionnaire form they sent over on 6/11    Needs to be filled out, signed and returned to [facility]    Fax# [phone]    Case# [id number]    Please call to advise  Thanks

## 2024-06-18 ENCOUNTER — OFFICE VISIT (OUTPATIENT)
Dept: FAMILY MEDICINE | Facility: CLINIC | Age: 34
End: 2024-06-18
Payer: COMMERCIAL

## 2024-06-18 VITALS — WEIGHT: 171 LBS | BODY MASS INDEX: 26.84 KG/M2 | HEIGHT: 67 IN

## 2024-06-18 DIAGNOSIS — F43.10 PTSD (POST-TRAUMATIC STRESS DISORDER): Primary | ICD-10-CM

## 2024-06-18 DIAGNOSIS — F41.8 ANXIETY WITH DEPRESSION: ICD-10-CM

## 2024-06-18 PROCEDURE — 1159F MED LIST DOCD IN RCRD: CPT | Mod: CPTII,95,, | Performed by: FAMILY MEDICINE

## 2024-06-18 PROCEDURE — 1160F RVW MEDS BY RX/DR IN RCRD: CPT | Mod: CPTII,95,, | Performed by: FAMILY MEDICINE

## 2024-06-18 PROCEDURE — 3008F BODY MASS INDEX DOCD: CPT | Mod: CPTII,95,, | Performed by: FAMILY MEDICINE

## 2024-06-18 PROCEDURE — 99213 OFFICE O/P EST LOW 20 MIN: CPT | Mod: 95,,, | Performed by: FAMILY MEDICINE

## 2024-07-26 ENCOUNTER — OFFICE VISIT (OUTPATIENT)
Dept: PSYCHIATRY | Facility: CLINIC | Age: 34
End: 2024-07-26
Payer: COMMERCIAL

## 2024-07-26 DIAGNOSIS — F43.10 PTSD (POST-TRAUMATIC STRESS DISORDER): Primary | ICD-10-CM

## 2024-07-26 RX ORDER — BUPROPION HYDROCHLORIDE 150 MG/1
150 TABLET ORAL DAILY
Qty: 90 TABLET | Refills: 1 | Status: SHIPPED | OUTPATIENT
Start: 2024-07-26 | End: 2025-07-26

## 2024-07-26 RX ORDER — ESCITALOPRAM OXALATE 20 MG/1
20 TABLET ORAL DAILY
Qty: 90 TABLET | Refills: 1 | Status: SHIPPED | OUTPATIENT
Start: 2024-07-26 | End: 2025-07-26

## 2024-07-26 NOTE — PROGRESS NOTES
The patient location is: [place], [place]  The chief complaint leading to consultation is: depression, PTSD  Visit type: Virtual visit with synchronous audio and video  Each patient to whom he or she provides medical services by telemedicine is:  (1) informed of the relationship between the physician and patient and the respective role of any other health care provider with respect to management of the patient; and (2) notified that he or she may decline to receive medical services by telemedicine and may withdraw from such care at any time.  Face-to-Face time: 11 minutes    Notes:      Outpatient Psychiatry Follow-Up Visit    Clinical Status of Patient: Outpatient (Ambulatory)  07/26/2024     Chief Complaint: 33 year old male presenting today for a follow-up.       Interval History and Content of Current Session:  Interim Events/Subjective Report/Content of Current Session:  follow-up appointment.    Pt is a 33 year old male with past psychiatric hx of PTSD who presents for follow-up treatment. Pt feels better on Wellbutrin and having moved to [facility]. Pt still feels isolated and is working long days but he is managing his stress better. Pt denied any other major changes or new stressors since the last session.     Past Psychiatric hx: inpt X 1 in 2017 and prior treatment with Abilify, Trintellix, Wellbutrin, Zoloft (robotic), Buspar (effective), Prazosin (effective); Prozac (poor tolerance)    Past Medical hx:   Past Medical History:   Diagnosis Date    Anxiety     Depression     Tobacco use     Tricuspid insufficiency         Interim hx:  Medication changes last visit:  started Wellbutrin XL 150mg Q AM  Anxiety: mild - unchanged  Depression: moderate - increased     Denies suicidal/homicidal ideations.  Denies hopelessness/worthlessness.    Denies auditory/visual hallucinations      Alcohol: minimal, infrequent  Drug: pt denied  Caffeine: minimal use  Tobacco: pt denied      Review of Systems    PSYCHIATRIC: Pertinent items are noted in the narrative.        CONSTITUTIONAL: weight stable    Past Medical, Family and Social History: The patient's past medical, family and social history have been reviewed and updated as appropriate within the electronic medical record. See encounter notes.     Current Psychiatric Medication:  Lexapro 20mg one daily and Wellbutrin XL 150mg Q AM     Compliance: yes     Side effects: pt denied    Risk Parameters:  Patient reports no suicidal ideation  Patient reports no homicidal ideation  Patient reports no self-injurious behavior  Patient reports no violent behavior     Exam (detailed: at least 9 elements; comprehensive: all 15 elements)   Constitutional  Vitals:  Most recent vital signs, dated less than 90 days prior to this appointment, were reviewed. BP: ()/()   Arterial Line BP: ()/()       General:  unremarkable, age appropriate, casual attire, good eye contact, good rapport       Musculoskeletal  Muscle Strength/Tone:  no flaccidity, no tremor    Gait & Station:  normal      Psychiatric                       Speech:  normal tone, normal rate, rhythm, and volume   Mood & Affect:   Depressed, anxious         Thought Process:   Goal directed; Linear    Associations:   intact   Thought Content:   No SI/HI, delusions, or paranoia, no AV/VH   Insight & Judgement:   Good, adequate to circumstances   Orientation:   grossly intact; alert and oriented x 4    Memory:  intact for content of interview    Language:  grossly intact, can repeat    Attention Span  : Grossly intact for content of interview   Fund of Knowledge:   intact and appropriate to age and level of education        Assessment and Diagnosis   Status/Progress: improving     Impression: Pt struggles with depression and anxiety symptoms in the context of complex trauma. Pt should continue in psychiatric medication management, start counseling, and start a cardio exercise regimen.     Diagnosis:  PTSD    Intervention/Counseling/Treatment Plan   Medication Management:      Continue Lexapro 20mg one daily and Wellbutrin XL 150mg Q AM    2. Continue therapy with [facility]    3. Start a cardio exercise regimen     4. Call to report any worsening of symptoms or problems with the medication. Pt instructed to go to ER with thoughts of harming self, others    Psychotherapy:   Target symptoms: depression, anxiety  Why chosen therapy is appropriate versus another modality: CBT used; relevant to diagnosis, patient responds to this modality  Outcome monitoring methods: self-report, observation  Therapeutic intervention type: Cognitive Behavioral Therapy  Topics discussed/themes: building skills sets for symptom management, symptom recognition, nutrition, exercise  The patient's response to the intervention is good  Patient's response to treatment is: good.   The patient's progress toward treatment goals: improving     Return to clinic: 6 months or earlier PRN    -Cognitive-Behavioral/Supportive therapy and psychoeducation provided  -R/B/SE's of medications discussed with the pt who expresses understanding and chooses to take medications as prescribed.   -Pt instructed to call clinic, 911 or go to nearest emergency room if sxs worsen or pt is in   crisis. The pt expresses understanding.    Jose A eBaver, PhD, MP

## 2025-02-06 ENCOUNTER — CLINICAL SUPPORT (OUTPATIENT)
Dept: OTHER | Facility: CLINIC | Age: 35
End: 2025-02-06

## 2025-02-06 DIAGNOSIS — Z00.8 ENCOUNTER FOR OTHER GENERAL EXAMINATION: ICD-10-CM

## 2025-02-07 VITALS
SYSTOLIC BLOOD PRESSURE: 116 MMHG | WEIGHT: 153 LBS | DIASTOLIC BLOOD PRESSURE: 68 MMHG | HEIGHT: 67 IN | BODY MASS INDEX: 24.01 KG/M2

## 2025-02-07 LAB
HDLC SERPL-MCNC: 53 MG/DL
POC CHOLESTEROL, LDL (DOCK): 148 MG/DL
POC CHOLESTEROL, TOTAL: 217 MG/DL
POC GLUCOSE, FASTING: 62 MG/DL (ref 60–110)
TRIGL SERPL-MCNC: 90 MG/DL

## 2025-08-19 ENCOUNTER — PATIENT MESSAGE (OUTPATIENT)
Dept: ADMINISTRATIVE | Facility: HOSPITAL | Age: 35
End: 2025-08-19
Payer: COMMERCIAL

## (undated) DEVICE — ELECTRODE REM PLYHSV RETURN 9

## (undated) DEVICE — SEE MEDLINE ITEM 157148

## (undated) DEVICE — Device

## (undated) DEVICE — SUT MONOCYRL 4-0 PS2 UND

## (undated) DEVICE — APPLICATOR CHLORAPREP ORN 26ML

## (undated) DEVICE — SUT 3-0 VICRYL / SH (J416)

## (undated) DEVICE — ELECTRODE BLADE W/SLEEVE 2.75

## (undated) DEVICE — DRAIN PENROSE XRAY 12 X 1/2 ST

## (undated) DEVICE — TOWEL OR DISP STRL BLUE 4/PK

## (undated) DEVICE — SEE L#120831

## (undated) DEVICE — PENCIL ROCKER SWITCH 10FT CORD

## (undated) DEVICE — GLOVE SURG BIOGEL LATEX SZ 7.5

## (undated) DEVICE — TIP YANKAUERS BULB NO VENT

## (undated) DEVICE — SEE MEDLINE ITEM 157128